# Patient Record
Sex: FEMALE | Race: WHITE | Employment: OTHER | URBAN - METROPOLITAN AREA
[De-identification: names, ages, dates, MRNs, and addresses within clinical notes are randomized per-mention and may not be internally consistent; named-entity substitution may affect disease eponyms.]

---

## 2018-03-11 ENCOUNTER — APPOINTMENT (OUTPATIENT)
Dept: GENERAL RADIOLOGY | Facility: HOSPITAL | Age: 73
End: 2018-03-11
Attending: EMERGENCY MEDICINE
Payer: MEDICARE

## 2018-03-11 ENCOUNTER — HOSPITAL ENCOUNTER (EMERGENCY)
Facility: HOSPITAL | Age: 73
Discharge: HOME | End: 2018-03-11
Attending: EMERGENCY MEDICINE
Payer: MEDICARE

## 2018-03-11 VITALS
BODY MASS INDEX: 28.13 KG/M2 | HEIGHT: 66 IN | RESPIRATION RATE: 16 BRPM | WEIGHT: 175 LBS | HEART RATE: 108 BPM | OXYGEN SATURATION: 96 % | TEMPERATURE: 98 F | SYSTOLIC BLOOD PRESSURE: 105 MMHG | DIASTOLIC BLOOD PRESSURE: 78 MMHG

## 2018-03-11 DIAGNOSIS — M79.672 LEFT FOOT PAIN: Primary | ICD-10-CM

## 2018-03-11 LAB — GLUCOSE BLD-MCNC: 95 MG/DL (ref 65–99)

## 2018-03-11 PROCEDURE — 82962 GLUCOSE BLOOD TEST: CPT

## 2018-03-11 PROCEDURE — 99283 EMERGENCY DEPT VISIT LOW MDM: CPT

## 2018-03-11 PROCEDURE — 73630 X-RAY EXAM OF FOOT: CPT | Performed by: EMERGENCY MEDICINE

## 2018-03-11 RX ORDER — COLCHICINE 0.6 MG/1
0.6 TABLET ORAL DAILY
Qty: 10 TABLET | Refills: 0 | Status: SHIPPED | OUTPATIENT
Start: 2018-03-11 | End: 2018-03-21

## 2018-03-11 RX ORDER — HYDROCODONE BITARTRATE AND ACETAMINOPHEN 5; 325 MG/1; MG/1
2 TABLET ORAL ONCE
Status: COMPLETED | OUTPATIENT
Start: 2018-03-11 | End: 2018-03-11

## 2018-03-11 RX ORDER — HYDROCODONE BITARTRATE AND ACETAMINOPHEN 5; 325 MG/1; MG/1
1 TABLET ORAL EVERY 4 HOURS PRN
Qty: 15 TABLET | Refills: 0 | Status: SHIPPED | OUTPATIENT
Start: 2018-03-11 | End: 2018-03-28

## 2018-03-11 RX ORDER — CLINDAMYCIN HYDROCHLORIDE 300 MG/1
300 CAPSULE ORAL 3 TIMES DAILY
Qty: 30 CAPSULE | Refills: 0 | Status: SHIPPED | OUTPATIENT
Start: 2018-03-11 | End: 2018-03-21

## 2018-03-11 NOTE — ED INITIAL ASSESSMENT (HPI)
Pt aox4. Pt presents to ed from home. Pt lives at home by herself. Pt c/o left foot pain that radiates to left shin. Pt states this is chronic pain but worse today.

## 2018-03-11 NOTE — ED PROVIDER NOTES
Patient Seen in: [facility] Emergency Department    History   Patient presents with:  Pain (neurologic)    Stated Complaint: left foot pain    HPI    This is a 62-year-old female who presents with left foot pain.   The patient describes her left foot lavinia. She does have good pulses sensory exam is normal there is no redness or cellulitis the left great toe is slightly swollen and it is tender on palpation and the dorsum of the foot is tender. Neuro: Alert and oriented.   The patient is moving complaints of chest pain or shortness of breath. The pain is all localized to the toe itself.   I discussed with her that it is slightly red and possibly it is gout but more more likely it is gout then and skin infection but empirically started on oral ant

## 2018-03-11 NOTE — CM/SW NOTE
When ready for discharge patient agreeable to [address] She was informed of $40 + $2 mile cost. She has contacted her family and is confident they can  medications.

## 2018-03-28 ENCOUNTER — HOSPITAL ENCOUNTER (INPATIENT)
Facility: HOSPITAL | Age: 73
LOS: 2 days | Discharge: HOME | DRG: 300 | End: 2018-03-30
Attending: EMERGENCY MEDICINE | Admitting: HOSPITALIST
Payer: MEDICARE

## 2018-03-28 ENCOUNTER — APPOINTMENT (OUTPATIENT)
Dept: ULTRASOUND IMAGING | Facility: HOSPITAL | Age: 73
DRG: 300 | End: 2018-03-28
Attending: EMERGENCY MEDICINE
Payer: MEDICARE

## 2018-03-28 DIAGNOSIS — J44.9 CHRONIC OBSTRUCTIVE PULMONARY DISEASE, UNSPECIFIED COPD TYPE (HCC): ICD-10-CM

## 2018-03-28 DIAGNOSIS — I99.8 VASCULAR OCCLUSION: Primary | ICD-10-CM

## 2018-03-28 DIAGNOSIS — I73.9 PERIPHERAL VASCULAR DISEASE OF EXTREMITY WITH CLAUDICATION (HCC): ICD-10-CM

## 2018-03-28 PROCEDURE — 93926 LOWER EXTREMITY STUDY: CPT | Performed by: EMERGENCY MEDICINE

## 2018-03-28 PROCEDURE — 99223 1ST HOSP IP/OBS HIGH 75: CPT | Performed by: HOSPITALIST

## 2018-03-28 RX ORDER — HYDROCODONE BITARTRATE AND ACETAMINOPHEN 5; 325 MG/1; MG/1
1 TABLET ORAL EVERY 4 HOURS PRN
Status: DISCONTINUED | OUTPATIENT
Start: 2018-03-28 | End: 2018-03-30

## 2018-03-28 RX ORDER — MORPHINE SULFATE 4 MG/ML
2 INJECTION, SOLUTION INTRAMUSCULAR; INTRAVENOUS EVERY 2 HOUR PRN
Status: DISCONTINUED | OUTPATIENT
Start: 2018-03-28 | End: 2018-03-28 | Stop reason: DRUGHIGH

## 2018-03-28 RX ORDER — DOCUSATE SODIUM 100 MG/1
100 CAPSULE, LIQUID FILLED ORAL 2 TIMES DAILY
Status: DISCONTINUED | OUTPATIENT
Start: 2018-03-28 | End: 2018-03-30

## 2018-03-28 RX ORDER — ASPIRIN 325 MG
325 TABLET, DELAYED RELEASE (ENTERIC COATED) ORAL DAILY
Status: DISCONTINUED | OUTPATIENT
Start: 2018-03-28 | End: 2018-03-30

## 2018-03-28 RX ORDER — MORPHINE SULFATE 4 MG/ML
4 INJECTION, SOLUTION INTRAMUSCULAR; INTRAVENOUS
Status: DISCONTINUED | OUTPATIENT
Start: 2018-03-28 | End: 2018-03-30

## 2018-03-28 RX ORDER — MORPHINE SULFATE 10 MG/ML
10 INJECTION, SOLUTION INTRAMUSCULAR; INTRAVENOUS ONCE
Status: COMPLETED | OUTPATIENT
Start: 2018-03-28 | End: 2018-03-28

## 2018-03-28 RX ORDER — LORAZEPAM 1 MG/1
1 TABLET ORAL EVERY 4 HOURS PRN
Status: DISCONTINUED | OUTPATIENT
Start: 2018-03-28 | End: 2018-03-30

## 2018-03-28 RX ORDER — ONDANSETRON 2 MG/ML
4 INJECTION INTRAMUSCULAR; INTRAVENOUS EVERY 6 HOURS PRN
Status: DISCONTINUED | OUTPATIENT
Start: 2018-03-28 | End: 2018-03-30

## 2018-03-28 RX ORDER — HYDROCODONE BITARTRATE AND ACETAMINOPHEN 5; 325 MG/1; MG/1
2 TABLET ORAL EVERY 4 HOURS PRN
Status: DISCONTINUED | OUTPATIENT
Start: 2018-03-28 | End: 2018-03-28

## 2018-03-28 RX ORDER — BISACODYL 10 MG
10 SUPPOSITORY, RECTAL RECTAL
Status: DISCONTINUED | OUTPATIENT
Start: 2018-03-28 | End: 2018-03-30

## 2018-03-28 RX ORDER — HYDROCODONE BITARTRATE AND ACETAMINOPHEN 5; 325 MG/1; MG/1
1 TABLET ORAL EVERY 4 HOURS PRN
Status: DISCONTINUED | OUTPATIENT
Start: 2018-03-28 | End: 2018-03-28

## 2018-03-28 RX ORDER — MORPHINE SULFATE 4 MG/ML
8 INJECTION, SOLUTION INTRAMUSCULAR; INTRAVENOUS
Status: DISCONTINUED | OUTPATIENT
Start: 2018-03-28 | End: 2018-03-30

## 2018-03-28 RX ORDER — MORPHINE SULFATE 4 MG/ML
1 INJECTION, SOLUTION INTRAMUSCULAR; INTRAVENOUS EVERY 2 HOUR PRN
Status: DISCONTINUED | OUTPATIENT
Start: 2018-03-28 | End: 2018-03-30

## 2018-03-28 RX ORDER — TEMAZEPAM 7.5 MG/1
7.5 CAPSULE ORAL NIGHTLY PRN
Status: DISCONTINUED | OUTPATIENT
Start: 2018-03-28 | End: 2018-03-30

## 2018-03-28 RX ORDER — HEPARIN SODIUM 5000 [USP'U]/ML
60 INJECTION INTRAVENOUS; SUBCUTANEOUS ONCE
Status: COMPLETED | OUTPATIENT
Start: 2018-03-28 | End: 2018-03-28

## 2018-03-28 RX ORDER — LORAZEPAM 0.5 MG/1
0.5 TABLET ORAL EVERY 4 HOURS PRN
Status: DISCONTINUED | OUTPATIENT
Start: 2018-03-28 | End: 2018-03-30

## 2018-03-28 RX ORDER — POLYETHYLENE GLYCOL 3350 17 G/17G
17 POWDER, FOR SOLUTION ORAL DAILY PRN
Status: DISCONTINUED | OUTPATIENT
Start: 2018-03-28 | End: 2018-03-30

## 2018-03-28 RX ORDER — ONDANSETRON 2 MG/ML
4 INJECTION INTRAMUSCULAR; INTRAVENOUS ONCE
Status: COMPLETED | OUTPATIENT
Start: 2018-03-28 | End: 2018-03-28

## 2018-03-28 RX ORDER — HEPARIN SODIUM AND DEXTROSE 10000; 5 [USP'U]/100ML; G/100ML
12 INJECTION INTRAVENOUS ONCE
Status: COMPLETED | OUTPATIENT
Start: 2018-03-28 | End: 2018-03-29

## 2018-03-28 RX ORDER — ACETAMINOPHEN 325 MG/1
650 TABLET ORAL EVERY 4 HOURS PRN
Status: DISCONTINUED | OUTPATIENT
Start: 2018-03-28 | End: 2018-03-30

## 2018-03-28 RX ORDER — ALPRAZOLAM 0.25 MG/1
0.25 TABLET ORAL 2 TIMES DAILY PRN
Status: DISCONTINUED | OUTPATIENT
Start: 2018-03-28 | End: 2018-03-30

## 2018-03-28 RX ORDER — MORPHINE SULFATE 4 MG/ML
2 INJECTION, SOLUTION INTRAMUSCULAR; INTRAVENOUS
Status: DISCONTINUED | OUTPATIENT
Start: 2018-03-28 | End: 2018-03-30

## 2018-03-28 RX ORDER — NICOTINE 21 MG/24HR
1 PATCH, TRANSDERMAL 24 HOURS TRANSDERMAL DAILY
Status: DISCONTINUED | OUTPATIENT
Start: 2018-03-28 | End: 2018-03-30

## 2018-03-28 RX ORDER — SODIUM CHLORIDE 9 MG/ML
INJECTION, SOLUTION INTRAVENOUS CONTINUOUS
Status: DISCONTINUED | OUTPATIENT
Start: 2018-03-28 | End: 2018-03-30

## 2018-03-28 RX ORDER — HEPARIN SODIUM AND DEXTROSE 10000; 5 [USP'U]/100ML; G/100ML
INJECTION INTRAVENOUS CONTINUOUS
Status: DISCONTINUED | OUTPATIENT
Start: 2018-03-29 | End: 2018-03-29

## 2018-03-28 RX ORDER — LORAZEPAM 2 MG/ML
1 INJECTION INTRAMUSCULAR ONCE
Status: COMPLETED | OUTPATIENT
Start: 2018-03-28 | End: 2018-03-28

## 2018-03-28 RX ORDER — HYDROCODONE BITARTRATE AND ACETAMINOPHEN 10; 325 MG/1; MG/1
1 TABLET ORAL EVERY 4 HOURS PRN
Status: DISCONTINUED | OUTPATIENT
Start: 2018-03-28 | End: 2018-03-30

## 2018-03-28 RX ORDER — MORPHINE SULFATE 4 MG/ML
4 INJECTION, SOLUTION INTRAMUSCULAR; INTRAVENOUS EVERY 2 HOUR PRN
Status: DISCONTINUED | OUTPATIENT
Start: 2018-03-28 | End: 2018-03-28 | Stop reason: DRUGHIGH

## 2018-03-28 NOTE — ED INITIAL ASSESSMENT (HPI)
Pt c/o pain to left foot. Pt states she fell 9 weeks ago and has had pain in the foot since. Pain has been increasing. Sent to ED from podiatrist. Left foot noted to have discoloration and weak but + pedal pulse.

## 2018-03-28 NOTE — ED PROVIDER NOTES
Patient Seen in: [facility] Emergency Department    History   Patient presents with:  Pain (neurologic)    Stated Complaint: left foot injury a few weeks ago, MRI scheduled next week, intractable pain    HPI    78-year-old female presents to the [facility]  Resp: 22  Temp: 98.4 °F (36.9 °C)  Temp src: Temporal  SpO2: 96 %  O2 Device: None (Room air)    Current:/85   Pulse 106   Temp 98.4 °F (36.9 °C) (Temporal)   Resp 18   Ht 167.6 cm (5' 6\")   Wt 79.4 kg   SpO2 95%   BMI 28.25 kg/m²         Physi components within normal limits   PTT, ACTIVATED - Abnormal; Notable for the following:     PTT 38.9 (*)     All other components within normal limits    Narrative: The aPTT Heparin Therapeutic Range is approximately 65- 104 seconds.  The therapeutic ra by: Ovi Harding MD on 3/28/2018 at 17:35     Approved by: Ovi Harding MD          ED Course as of Mar 28 1959  ------------------------------------------------------------       MDM     Patient IV established and blood work obtained.   I did obtain some

## 2018-03-29 ENCOUNTER — APPOINTMENT (OUTPATIENT)
Dept: CV DIAGNOSTICS | Facility: HOSPITAL | Age: 73
DRG: 300 | End: 2018-03-29
Attending: HOSPITALIST
Payer: MEDICARE

## 2018-03-29 ENCOUNTER — APPOINTMENT (OUTPATIENT)
Dept: INTERVENTIONAL RADIOLOGY/VASCULAR | Facility: HOSPITAL | Age: 73
DRG: 300 | End: 2018-03-29
Attending: SURGERY
Payer: MEDICARE

## 2018-03-29 PROCEDURE — 93306 TTE W/DOPPLER COMPLETE: CPT | Performed by: HOSPITALIST

## 2018-03-29 PROCEDURE — B41DYZZ FLUOROSCOPY OF AORTA AND BILATERAL LOWER EXTREMITY ARTERIES USING OTHER CONTRAST: ICD-10-PCS | Performed by: SURGERY

## 2018-03-29 PROCEDURE — 99232 SBSQ HOSP IP/OBS MODERATE 35: CPT | Performed by: HOSPITALIST

## 2018-03-29 RX ORDER — HEPARIN SODIUM 5000 [USP'U]/ML
INJECTION, SOLUTION INTRAVENOUS; SUBCUTANEOUS
Status: COMPLETED
Start: 2018-03-29 | End: 2018-03-29

## 2018-03-29 RX ORDER — LIDOCAINE HYDROCHLORIDE 10 MG/ML
INJECTION, SOLUTION INFILTRATION; PERINEURAL
Status: COMPLETED
Start: 2018-03-29 | End: 2018-03-29

## 2018-03-29 RX ORDER — MIDAZOLAM HYDROCHLORIDE 1 MG/ML
INJECTION INTRAMUSCULAR; INTRAVENOUS
Status: COMPLETED
Start: 2018-03-29 | End: 2018-03-29

## 2018-03-29 NOTE — BRIEF OP NOTE
Pre-Operative Diagnosis: Atherosclerosis with rest pain left leg     Post-Operative Diagnosis: Same     Procedure Performed:   1. Ultrasound-guided percutaneous access right common femoral artery  2. Aortogram  3.   Selection of left common femoral artery

## 2018-03-29 NOTE — CONSULTS
[facility]  Vascular Surgery Consultation    [facility] Patient Status:  Emergency    3/1/1945 MRN [id number]   Location [address] [facility] Attending Juanita Valverde MD   Hosp Day # 0 PCP No primary care provider on file.      R abdominal pain, heartburn, diarrhea, blood in bm, tarry bm, constipation,    : denies difficulty urinating, pain, blood in urine, or frequency  SKIN: denies any unusual skin lesions or rashes  MUSCULOSKELETAL: denies back pain, joint pain, leg swelling ultrasound: I independently reviewed her ultrasound images and she has a long segment appearing occlusion of her superficial femoral and popliteal artery with reconstitution    Impression and Plan:  Atherosclerosis with rest pain: After much discussion pat

## 2018-03-29 NOTE — PHYSICAL THERAPY NOTE
Attempted to see pt this AM. Per RN, pt is not appropriate for PT at this time. Pt is supposed to have a procedure later today. Will follow up at a later date.

## 2018-03-29 NOTE — PLAN OF CARE
Received patient in stable condition from ED.  AOx4, anxious, irritated. VSS on RA  C/O pain to the left lower extremity relieved with NORCO/morphine. Right pedal pulse by doppler, left pedal absent.   Discoloration on left foot noted from great toe to fo

## 2018-03-29 NOTE — OPERATIVE REPORT
Pre-Operative Diagnosis: Atherosclerosis with rest pain left leg     Post-Operative Diagnosis: Same     Procedure Performed:   1. Ultrasound-guided percutaneous access right common femoral artery  2. Aortogram  3.   Selection of left common femoral artery Patient was taken to IR Cath Lab and prepped and draped in sterile fashion. Monitored conscious sedation initiated. Timeout performed.   Using ultrasound I percutaneously accessed the right common femoral artery under direct visualization with a micropunc arteries   All devices were removed and pressure was held.       Yeni Roman MD  3/29/2018  11:16 AM

## 2018-03-29 NOTE — H&P
[facility]  History and Physical     Dmitri Perez Patient Status:  Emergency    3/1/1945 MRN [id number]   Location [address] [facility] Attending Debbie Fernandez MD   Hosp Day # 0 PCP No primary care provider on file.      Billie Equal pulses. Chest and Back: No tenderness or deformity. Abdomen: Soft, nontender, nondistended. Positive bowel sounds. No rebound, guarding or organomegaly. Neurologic: No focal neurological deficits. CNII-XII grossly intact.   Musculoskeletal: Moves

## 2018-03-29 NOTE — PROGRESS NOTES
I spoke with daughter and asked that she come in for me to discuss the findings of angiogram with her mother. After daughter and daughter's  arrived I discussed angiogram findings.   She has severe distal arterial disease in both her lower extremiti

## 2018-03-29 NOTE — CM/SW NOTE
Pt seen for d/c planning. Pt is a 69 yo female admitted for a vascular occlusion. Pt is  and lives alone in an apartment on the 3rd floor with an elevator. Pt has two dtrs who both live nearby. Pt was fairly independent for her adls.   She walks

## 2018-03-30 VITALS
RESPIRATION RATE: 18 BRPM | DIASTOLIC BLOOD PRESSURE: 56 MMHG | WEIGHT: 175.06 LBS | HEIGHT: 66 IN | SYSTOLIC BLOOD PRESSURE: 99 MMHG | TEMPERATURE: 99 F | HEART RATE: 93 BPM | BODY MASS INDEX: 28.14 KG/M2 | OXYGEN SATURATION: 95 %

## 2018-03-30 PROCEDURE — 99238 HOSP IP/OBS DSCHRG MGMT 30/<: CPT | Performed by: HOSPITALIST

## 2018-03-30 RX ORDER — NICOTINE 21 MG/24HR
1 PATCH, TRANSDERMAL 24 HOURS TRANSDERMAL DAILY
Qty: 30 PATCH | Refills: 0 | Status: SHIPPED | OUTPATIENT
Start: 2018-03-31 | End: 2018-04-05

## 2018-03-30 RX ORDER — HYDROCODONE BITARTRATE AND ACETAMINOPHEN 10; 325 MG/1; MG/1
1 TABLET ORAL EVERY 6 HOURS PRN
Qty: 40 TABLET | Refills: 0 | Status: ON HOLD | OUTPATIENT
Start: 2018-03-30 | End: 2018-04-14

## 2018-03-30 RX ORDER — HYDROCODONE BITARTRATE AND ACETAMINOPHEN 10; 325 MG/1; MG/1
1 TABLET ORAL EVERY 4 HOURS PRN
Qty: 40 TABLET | Refills: 0 | Status: SHIPPED | OUTPATIENT
Start: 2018-03-30 | End: 2018-03-30

## 2018-03-30 NOTE — PHYSICAL THERAPY NOTE
PT order received 3/28/18 and patient not appropriate for therapy on 3/29/18. PT order cancelled on 3/29/18 per Dr. Rosalino Tai.  Discussed with RN that orders were cancelled and patient will require new therapy orders per vascular surgery, if and when appropria

## 2018-03-30 NOTE — PLAN OF CARE
Patient alert and oriented times four. Meds given per MAR. Vital signs stable. Resting comfortably in bed. Foot elevated. Call light in reach.     Impaired Functional Mobility    • Achieve highest/safest level of mobility/gait Progressing        PAIN - AD

## 2018-03-30 NOTE — PLAN OF CARE
Assumed patient care 0700. Patient a/o x 4, anxious at times. VSS during shift. Oxygen maintained on RA. Pain managed with PRN Tullos and Morphine. Pain in left foot, stabbing and burning. Patient went for angiogram today w/ Dr. Josse Overton.  Site intact, soft, n

## 2018-03-30 NOTE — PROGRESS NOTES
[facility]  Progress Note     Gt Gates Patient Status:  Inpatient    3/1/1945 MRN [id number]   [facility] 7NE-A Attending Payton White MD   Hosp Day # 2 PCP No primary care provider on file.      Chief Complaint: left foot pain mg/dL). Recent Labs   Lab  03/28/18   1535   PTP  15.6*   INR  1.19*       No results for input(s): TROP, CK in the last 72 hours. Imaging: Imaging data reviewed in Epic.     Medications:   • aspirin EC  325 mg Oral Daily   • docusate sodium  100

## 2018-03-31 NOTE — DISCHARGE SUMMARY
[facility] HOSPITALIST  DISCHARGE SUMMARY     Sherrie Archibald Patient Status:  Inpatient    3/1/1945 MRN [id number]   [facility] 7NE-A Attending No att. providers found   Hosp Day # 2 PCP No primary care provider on file.      Date of Admission: along with the bottom of the foot. Patient underwent peripheral angiogram by PV surgery, severe peripheral vascular disease was found in the left leg.   Recommendation was to obtain second opinion to see if any other treatment to improve blood flow is avai collaterals but no reconstitution of the peroneal or posterior tibial artery    Incidental or significant findings and recommendations (brief descriptions):  • Recommendation to get a second opinion from tertiary center for any type of treatment to improve auscultation bilaterally. No wheezes. No rhonchi. On RA   Cardiovascular: S1, S2. Regular rate and rhythm. No murmurs, rubs or gallops. NSR   Abdomen: Soft, nontender, nondistended. Positive bowel sounds. No rebound or guarding.   Neurologic: No focal ne

## 2018-04-03 NOTE — CM/SW NOTE
04/03/18 0900   Discharge disposition   Expected discharge disposition Home or Self   Name of [address] [place] [address] services after discharge None   Discharge transportation Private car

## 2018-04-09 ENCOUNTER — ANESTHESIA EVENT (OUTPATIENT)
Dept: SURGERY | Facility: HOSPITAL | Age: 73
End: 2018-04-09

## 2018-04-09 ENCOUNTER — ANESTHESIA (OUTPATIENT)
Dept: SURGERY | Facility: HOSPITAL | Age: 73
End: 2018-04-09

## 2018-04-09 ENCOUNTER — HOSPITAL ENCOUNTER (INPATIENT)
Facility: HOSPITAL | Age: 73
LOS: 5 days | Discharge: NURSING, REHAB OR LONG-TERM CARE | DRG: 240 | End: 2018-04-14
Attending: SURGERY | Admitting: SURGERY
Payer: MEDICARE

## 2018-04-09 ENCOUNTER — SURGERY (OUTPATIENT)
Age: 73
End: 2018-04-09

## 2018-04-09 PROBLEM — G47.30 SLEEP APNEA: Chronic | Status: ACTIVE | Noted: 2018-04-09

## 2018-04-09 PROBLEM — Z86.718 HISTORY OF BLOOD CLOTS: Status: ACTIVE | Noted: 2018-04-09

## 2018-04-09 PROCEDURE — 99222 1ST HOSP IP/OBS MODERATE 55: CPT | Performed by: INTERNAL MEDICINE

## 2018-04-09 PROCEDURE — 0Y6J0Z1 DETACHMENT AT LEFT LOWER LEG, HIGH, OPEN APPROACH: ICD-10-PCS | Performed by: SURGERY

## 2018-04-09 RX ORDER — ONDANSETRON 2 MG/ML
4 INJECTION INTRAMUSCULAR; INTRAVENOUS EVERY 6 HOURS PRN
Status: DISCONTINUED | OUTPATIENT
Start: 2018-04-09 | End: 2018-04-14

## 2018-04-09 RX ORDER — SODIUM CHLORIDE, SODIUM LACTATE, POTASSIUM CHLORIDE, CALCIUM CHLORIDE 600; 310; 30; 20 MG/100ML; MG/100ML; MG/100ML; MG/100ML
INJECTION, SOLUTION INTRAVENOUS CONTINUOUS
Status: DISCONTINUED | OUTPATIENT
Start: 2018-04-09 | End: 2018-04-09

## 2018-04-09 RX ORDER — MEPERIDINE HYDROCHLORIDE 25 MG/ML
12.5 INJECTION INTRAMUSCULAR; INTRAVENOUS; SUBCUTANEOUS AS NEEDED
Status: DISCONTINUED | OUTPATIENT
Start: 2018-04-09 | End: 2018-04-09 | Stop reason: HOSPADM

## 2018-04-09 RX ORDER — HYDROCODONE BITARTRATE AND ACETAMINOPHEN 5; 325 MG/1; MG/1
1 TABLET ORAL AS NEEDED
Status: DISCONTINUED | OUTPATIENT
Start: 2018-04-09 | End: 2018-04-09 | Stop reason: HOSPADM

## 2018-04-09 RX ORDER — DIPHENHYDRAMINE HYDROCHLORIDE 50 MG/ML
12.5 INJECTION INTRAMUSCULAR; INTRAVENOUS EVERY 4 HOURS PRN
Status: DISCONTINUED | OUTPATIENT
Start: 2018-04-09 | End: 2018-04-10

## 2018-04-09 RX ORDER — HEPARIN SODIUM 5000 [USP'U]/ML
5000 INJECTION, SOLUTION INTRAVENOUS; SUBCUTANEOUS EVERY 8 HOURS SCHEDULED
Status: DISCONTINUED | OUTPATIENT
Start: 2018-04-09 | End: 2018-04-14

## 2018-04-09 RX ORDER — HYDROCODONE BITARTRATE AND ACETAMINOPHEN 10; 325 MG/1; MG/1
1 TABLET ORAL EVERY 4 HOURS PRN
Status: DISCONTINUED | OUTPATIENT
Start: 2018-04-09 | End: 2018-04-10

## 2018-04-09 RX ORDER — MORPHINE SULFATE 4 MG/ML
2 INJECTION, SOLUTION INTRAMUSCULAR; INTRAVENOUS EVERY 30 MIN PRN
Status: DISPENSED | OUTPATIENT
Start: 2018-04-09 | End: 2018-04-12

## 2018-04-09 RX ORDER — NALOXONE HYDROCHLORIDE 0.4 MG/ML
0.08 INJECTION, SOLUTION INTRAMUSCULAR; INTRAVENOUS; SUBCUTANEOUS
Status: DISCONTINUED | OUTPATIENT
Start: 2018-04-09 | End: 2018-04-14

## 2018-04-09 RX ORDER — MORPHINE SULFATE 4 MG/ML
INJECTION, SOLUTION INTRAMUSCULAR; INTRAVENOUS
Status: DISPENSED
Start: 2018-04-09 | End: 2018-04-10

## 2018-04-09 RX ORDER — SODIUM CHLORIDE 9 MG/ML
INJECTION, SOLUTION INTRAVENOUS CONTINUOUS
Status: DISCONTINUED | OUTPATIENT
Start: 2018-04-09 | End: 2018-04-14

## 2018-04-09 RX ORDER — ONDANSETRON 2 MG/ML
4 INJECTION INTRAMUSCULAR; INTRAVENOUS AS NEEDED
Status: DISCONTINUED | OUTPATIENT
Start: 2018-04-09 | End: 2018-04-09 | Stop reason: HOSPADM

## 2018-04-09 RX ORDER — TEMAZEPAM 15 MG/1
15 CAPSULE ORAL NIGHTLY PRN
Status: DISCONTINUED | OUTPATIENT
Start: 2018-04-09 | End: 2018-04-10

## 2018-04-09 RX ORDER — KETOROLAC TROMETHAMINE 30 MG/ML
30 INJECTION, SOLUTION INTRAMUSCULAR; INTRAVENOUS EVERY 6 HOURS PRN
Status: DISCONTINUED | OUTPATIENT
Start: 2018-04-09 | End: 2018-04-10

## 2018-04-09 RX ORDER — MORPHINE SULFATE 4 MG/ML
2 INJECTION, SOLUTION INTRAMUSCULAR; INTRAVENOUS EVERY 5 MIN PRN
Status: DISCONTINUED | OUTPATIENT
Start: 2018-04-09 | End: 2018-04-09 | Stop reason: HOSPADM

## 2018-04-09 RX ORDER — HYDROCODONE BITARTRATE AND ACETAMINOPHEN 10; 325 MG/1; MG/1
2 TABLET ORAL EVERY 4 HOURS PRN
Status: DISCONTINUED | OUTPATIENT
Start: 2018-04-09 | End: 2018-04-10

## 2018-04-09 RX ORDER — MIDAZOLAM HYDROCHLORIDE 1 MG/ML
1 INJECTION INTRAMUSCULAR; INTRAVENOUS EVERY 5 MIN PRN
Status: DISCONTINUED | OUTPATIENT
Start: 2018-04-09 | End: 2018-04-09 | Stop reason: HOSPADM

## 2018-04-09 RX ORDER — NALBUPHINE HCL 10 MG/ML
2.5 AMPUL (ML) INJECTION EVERY 4 HOURS PRN
Status: DISCONTINUED | OUTPATIENT
Start: 2018-04-09 | End: 2018-04-14

## 2018-04-09 RX ORDER — CLINDAMYCIN PHOSPHATE 900 MG/50ML
900 INJECTION INTRAVENOUS EVERY 8 HOURS
Status: COMPLETED | OUTPATIENT
Start: 2018-04-09 | End: 2018-04-10

## 2018-04-09 RX ORDER — IPRATROPIUM BROMIDE AND ALBUTEROL SULFATE 2.5; .5 MG/3ML; MG/3ML
3 SOLUTION RESPIRATORY (INHALATION) EVERY 6 HOURS PRN
Status: DISCONTINUED | OUTPATIENT
Start: 2018-04-09 | End: 2018-04-14

## 2018-04-09 RX ORDER — NALOXONE HYDROCHLORIDE 0.4 MG/ML
80 INJECTION, SOLUTION INTRAMUSCULAR; INTRAVENOUS; SUBCUTANEOUS AS NEEDED
Status: DISCONTINUED | OUTPATIENT
Start: 2018-04-09 | End: 2018-04-09 | Stop reason: HOSPADM

## 2018-04-09 RX ORDER — MORPHINE SULFATE 4 MG/ML
INJECTION, SOLUTION INTRAMUSCULAR; INTRAVENOUS
Status: COMPLETED
Start: 2018-04-09 | End: 2018-04-09

## 2018-04-09 RX ORDER — SODIUM CHLORIDE, SODIUM LACTATE, POTASSIUM CHLORIDE, CALCIUM CHLORIDE 600; 310; 30; 20 MG/100ML; MG/100ML; MG/100ML; MG/100ML
INJECTION, SOLUTION INTRAVENOUS CONTINUOUS
Status: DISCONTINUED | OUTPATIENT
Start: 2018-04-09 | End: 2018-04-09 | Stop reason: HOSPADM

## 2018-04-09 RX ORDER — CLINDAMYCIN PHOSPHATE 900 MG/50ML
900 INJECTION INTRAVENOUS ONCE
Status: DISCONTINUED | OUTPATIENT
Start: 2018-04-09 | End: 2018-04-09 | Stop reason: HOSPADM

## 2018-04-09 RX ORDER — HYDROCODONE BITARTRATE AND ACETAMINOPHEN 5; 325 MG/1; MG/1
2 TABLET ORAL AS NEEDED
Status: DISCONTINUED | OUTPATIENT
Start: 2018-04-09 | End: 2018-04-09 | Stop reason: HOSPADM

## 2018-04-09 RX ORDER — MIDAZOLAM HYDROCHLORIDE 1 MG/ML
INJECTION INTRAMUSCULAR; INTRAVENOUS
Status: COMPLETED
Start: 2018-04-09 | End: 2018-04-09

## 2018-04-09 NOTE — CONSULTS
[facility]  Report of Consultation    Erika Rasmussen Patient Status:  Inpatient    3/1/1945 MRN [id number]   [facility] 3SW-A Attending Jayson Cardoza MD   Hosp Day # 0 PCP No primary care provider on file.      Reason for Consultatio drugs.     Allergies:    Pcns [Penicillins]      Rash, Swelling    Medications:    Current Facility-Administered Medications:   •  ondansetron HCl (ZOFRAN) injection 4 mg, 4 mg, Intravenous, Q6H PRN  •  Naloxone HCl (NARCAN) 0.4 MG/ML injection 0.08 mg, 0.0 by vascular surgery    1. COPD– stable at this time. Duo nebs as needed  2. Obesity with a BMI of 31.64  3.  Obstructive sleep apnea–patient does not use CPAP at home as informed by patient has unable to tolerate the machine according to patient and patien

## 2018-04-09 NOTE — H&P
[facility]  Vascular Surgery     Cam Speck Patient Status:  Hospital Outpatient Surgery    3/1/1945 MRN [id number]   Location [address] [facility] Attending Jacqueline Bear MD   Hosp Day # 0 PCP No primary care provider on file. swelling  NEURO/EYES: denies headaches, passing out, motor dysfunction, difficulty walking, difficulty with speech, temporary blindness, double vision, confusion    Physical Exam:  /81 (BP Location: Right arm)   Pulse 117   Temp 98.1 °F (36.7 °C) (Or

## 2018-04-09 NOTE — ANESTHESIA PREPROCEDURE EVALUATION
PRE-OP EVALUATION    Patient Name: Tamanna Rae    Pre-op Diagnosis: VASCULAR OCCLUSION      Procedure(s):  LEFT BELOW KNEE AMPUTATION     Surgeon(s) and Role:     Shaista Piedra MD - Primary    Pre-op vitals reviewed.   Temp: 98.1 °F (36.7 °C)  Pulse Chronic obstructive pulmonary disease, unspecified COPD type ([facility]          Past Surgical History:  RT HIP: OTHER     Smoking status: Current Every Day Smoker  1.00 Packs/day  For 56.00 Years     Smokeless tobacco: Never Used    Alcohol use No    Commen

## 2018-04-09 NOTE — BRIEF OP NOTE
Pre-Operative Diagnosis: Atherosclerosis with rest pain and ulcer      Post-Operative Diagnosis: same     Procedure Performed:   1.  Left below knee amputation with posterior flap    Surgeon(s) and Role:     Jenny Friedman MD - Primary    Assistant(s):

## 2018-04-09 NOTE — ANESTHESIA POSTPROCEDURE EVALUATION
Joe Patient Status:  Hospital Outpatient Surgery   Age/Gender 68year old female MRN [id number]   Location [address] [facility] Attending Jamee Campbell MD   [facility] Day # 0 PCP No primary care provider on f

## 2018-04-09 NOTE — CONSULTS
[facility] Pharmacy Note:  Pain Consult    Steven Bones is a 68year old female started on Morphine PCA by Dr. Caridad Gleason. Pharmacy was consulted to review medication profile and to discontinue previously ordered narcotics and sedatives.     Medication profile wa

## 2018-04-10 PROCEDURE — 99232 SBSQ HOSP IP/OBS MODERATE 35: CPT | Performed by: HOSPITALIST

## 2018-04-10 RX ORDER — NICOTINE 21 MG/24HR
1 PATCH, TRANSDERMAL 24 HOURS TRANSDERMAL
Status: DISCONTINUED | OUTPATIENT
Start: 2018-04-10 | End: 2018-04-14

## 2018-04-10 RX ORDER — KETOROLAC TROMETHAMINE 30 MG/ML
15 INJECTION, SOLUTION INTRAMUSCULAR; INTRAVENOUS EVERY 6 HOURS PRN
Status: DISPENSED | OUTPATIENT
Start: 2018-04-10 | End: 2018-04-11

## 2018-04-10 RX ORDER — HYDROCODONE BITARTRATE AND ACETAMINOPHEN 10; 325 MG/1; MG/1
1 TABLET ORAL EVERY 4 HOURS PRN
Status: DISCONTINUED | OUTPATIENT
Start: 2018-04-10 | End: 2018-04-11

## 2018-04-10 RX ORDER — HYDROCODONE BITARTRATE AND ACETAMINOPHEN 10; 325 MG/1; MG/1
1 TABLET ORAL
Status: DISCONTINUED | OUTPATIENT
Start: 2018-04-10 | End: 2018-04-11

## 2018-04-10 NOTE — PHYSICAL THERAPY NOTE
PHYSICAL THERAPY EVALUATION - INPATIENT     Room Number: 359/359-A  Evaluation Date: 4/10/2018  Type of Evaluation: Initial  Physician Order: PT Eval and Treat    Presenting Problem: s/p L BKA  Reason for Therapy: Mobility Dysfunction and Discharge P Cognitive Status:  WFL - within functional limits    RANGE OF MOTION AND STRENGTH ASSESSMENT  Upper extremity ROM and strength are within functional limits     Lower extremity ROM is within functional limits unable to fully assess L LE due to pain, but hip mobility  Functional activity tolerated  Lower therapeutic exercise:   Ankle pumps  Hip AB/AD  Knee extension  SLR  Upper therapeutic exercise:  Elbow flex/ext,  - open/close, OH Reaching, Shoulder flex/ext and Wrist flex/ext    Patient End of Session:

## 2018-04-10 NOTE — PROGRESS NOTES
PCA dose changed as ordered with second Rn present, patient informed of change/pain management plan. Toradol given for pain, patient states relief. Repositioned for comfort.  Dr Debbie Barbosa returned Rn's call, and stated ok to start heparin tonight SQ as ordered

## 2018-04-10 NOTE — PROGRESS NOTES
Acute Pain Service    PCA Follow-up Note    Indication: Post-Surgical.    Patient is comfortable at rest and tolerated sitting on edge of bed with PT today.      Side Effects: Sedation at times but awake alert and oriented x 3 now    Vital signs:     BP

## 2018-04-10 NOTE — PROGRESS NOTES
[facility]  Progress Note     Samreen Benitez Patient Status:  Inpatient    3/1/1945 MRN [id number]   [facility] 3SW-A Attending Lise Alba MD   Hosp Day # 1 PCP No primary care provider on file.      Chief Complaint: DVT  S: Gemma Carlson is expected to be discharge to: KANG Peters MD

## 2018-04-10 NOTE — PROGRESS NOTES
Patient received from PACU s/p L BKA with Dr Tera Mead. Ace wrap dressing and knee immobilizer to E.  Patient moaning due to pain, per report patient has been moaning from pain since arrival for surgery this am. MSO4 PCA 1/10/6, gave morphine bolus on arriva

## 2018-04-10 NOTE — PROGRESS NOTES
Discussed with pain management possibility of adding a muscle relaxant to patient's pain medications. Will be down to see patient and make medication adjustments.

## 2018-04-10 NOTE — PROGRESS NOTES
[facility] Pharmacy Note:  Age Based Dose Adjustment    Michael Frye has been prescribed ketorolac (TORADOL) 30 mg IV every 6 hours as needed. Patient is >71 years old therefore the dose has been adjusted to 15 mg IV every 6 hours as needed.       Thank you,  S

## 2018-04-10 NOTE — CM/SW NOTE
04/10/18 1200   Readmission Assessment   Did any new symptoms or issues develop after you were discharged? No   ----Post D/C symptoms: Symptoms/issue related to previous hospitalization Yes   Did you understand your discharge instructions?  Yes   Pt's le qualify for homemaker services and meals on wheels. Pt adds that they also completed a Medicaid application at that time, so it is pending. SW spoke with pt's dtr, Erasto Fay, re: DAN[facility] list and process for making choice.   She will review with pt and her siste

## 2018-04-11 PROCEDURE — 99232 SBSQ HOSP IP/OBS MODERATE 35: CPT | Performed by: HOSPITALIST

## 2018-04-11 RX ORDER — ALPRAZOLAM 0.25 MG/1
0.25 TABLET ORAL ONCE
Status: COMPLETED | OUTPATIENT
Start: 2018-04-11 | End: 2018-04-11

## 2018-04-11 RX ORDER — ALPRAZOLAM 0.25 MG/1
0.25 TABLET ORAL 3 TIMES DAILY PRN
Status: DISCONTINUED | OUTPATIENT
Start: 2018-04-11 | End: 2018-04-11

## 2018-04-11 RX ORDER — ALPRAZOLAM 0.25 MG/1
0.25 TABLET ORAL 3 TIMES DAILY
Status: DISCONTINUED | OUTPATIENT
Start: 2018-04-11 | End: 2018-04-13

## 2018-04-11 RX ORDER — GABAPENTIN 100 MG/1
100 CAPSULE ORAL 3 TIMES DAILY
Status: DISCONTINUED | OUTPATIENT
Start: 2018-04-11 | End: 2018-04-14

## 2018-04-11 RX ORDER — DIPHENHYDRAMINE HCL 25 MG
25 CAPSULE ORAL NIGHTLY
Status: DISCONTINUED | OUTPATIENT
Start: 2018-04-11 | End: 2018-04-11

## 2018-04-11 RX ORDER — DIPHENHYDRAMINE HCL 25 MG
25 CAPSULE ORAL NIGHTLY PRN
Status: DISCONTINUED | OUTPATIENT
Start: 2018-04-11 | End: 2018-04-14

## 2018-04-11 RX ORDER — MORPHINE SULFATE 4 MG/ML
2 INJECTION, SOLUTION INTRAMUSCULAR; INTRAVENOUS EVERY 2 HOUR PRN
Status: DISCONTINUED | OUTPATIENT
Start: 2018-04-12 | End: 2018-04-13 | Stop reason: DRUGHIGH

## 2018-04-11 RX ORDER — HYDROCODONE BITARTRATE AND ACETAMINOPHEN 10; 325 MG/1; MG/1
2 TABLET ORAL
Status: DISCONTINUED | OUTPATIENT
Start: 2018-04-11 | End: 2018-04-14

## 2018-04-11 NOTE — PROGRESS NOTES
[facility]  Progress Note     Saurav Rodriguez Patient Status:  Inpatient    3/1/1945 MRN [id number]   [facility] 3SW-A Attending Rainer Martinez MD   Hosp Day # 2 PCP No primary care provider on file.      Chief Complaint: left leg p hypoventilation syndrome- SHYAM protocol  5. Remote h/o DVT- no hypercoagulable state  6.  Nicotine dependence- patch PRN    Plan of care: as above    Quality:  · DVT Prophylaxis: Heparin  · CODE status: Full  · Kessler: N/A  · Central line: N/A    Estimated da

## 2018-04-11 NOTE — CM/SW NOTE
Spoke with pt's dtr, Janet Oropeza. She notes that her sister toured 2 places and Janet Oropeza will be touring another place. They will let SW know choice, so referral can be sent.

## 2018-04-11 NOTE — PROGRESS NOTES
Acute Pain Service     PCA Follow-up Note     Indication: Post-Surgical.     Patient is crying after being taken off bedpan. States PCT was too rough and rushed her and that other PCTs were more careful with her.  Consoled patient and offered use of PC

## 2018-04-11 NOTE — PHYSICAL THERAPY NOTE
PHYSICAL THERAPY TREATMENT NOTE - INPATIENT    Room Number: 407/554-Q     Session: 1   Number of Visits to Meet Established Goals: 5    Presenting Problem: S/P Left BKA on 04/09/18    Problem List  Active Problems:    Peripheral vascular disease of extrem the patient currently have. ..  -   Turning over in bed (including adjusting bedclothes, sheets and blankets)?: A Little   -   Sitting down on and standing up from a chair with arms (e.g., wheelchair, bedside commode, etc.): Unable   -   Moving from lying o participation with PT. Patient continues to function below baseline. Will continue to benefit by PT to maximize functional mobility & initiate pre prosthetic training.     DISCHARGE RECOMMENDATIONS  PT Discharge Recommendations: Sub-acute rehabilitation ([facility]ATA

## 2018-04-12 PROCEDURE — 99232 SBSQ HOSP IP/OBS MODERATE 35: CPT | Performed by: HOSPITALIST

## 2018-04-12 NOTE — PROGRESS NOTES
[facility]  Progress Note     Fide Dolly Patient Status:  Inpatient    3/1/1945 MRN [id number]   [facility] 3SW-A Attending Parish Lee MD   Hosp Day # 3 PCP No primary care provider on file.      Chief Complaint: left leg p as above  3. Lupus- no acute issue   4. SHYAM with obesity hypoventilation syndrome- SHYAM protocol  5. Remote h/o DVT- no hypercoagulable state  6.  Nicotine dependence- patch PRN    Plan of care: as above    Quality:  · DVT Prophylaxis: Heparin  · CODE status

## 2018-04-12 NOTE — PHYSICAL THERAPY NOTE
PHYSICAL THERAPY TREATMENT NOTE - INPATIENT    Room Number: 350/438-A     Session: 2  Number of Visits to Meet Established Goals: 5    Presenting Problem: S/P Left BKA on 04/09/18    Problem List  Active Problems:    Peripheral vascular disease of extremi patient currently have. ..  -   Turning over in bed (including adjusting bedclothes, sheets and blankets)?: A Little   -   Sitting down on and standing up from a chair with arms (e.g., wheelchair, bedside commode, etc.): Unable   -   Moving from lying on ba for BRIGETTE LOPEZ. Pertinent comorbidities and personal factors impacting therapy include pain, anxiety, COPD. Continues to remain very anxious & pain limiting her participation with PT. Patient continues to function below baseline. Will continue to benefit by PT t

## 2018-04-12 NOTE — PROGRESS NOTES
[facility]  Progress Note    Risa Wadsworth Patient Status:  Inpatient    3/1/1945 MRN [id number]   [facility] 3SW-A Attending Jamar Crystal MD   Hosp Day # 3 PCP No primary care provider on file. Subjective:   Risa Wadsworth is

## 2018-04-12 NOTE — CM/SW NOTE
Call from pt's dtr, Nicola Wagoner. She and sister have decided that [facility] would be first choice, then [facility] and [facility]. Referrals sent to above SARs via [facility]- awaiting responses.

## 2018-04-12 NOTE — BH LEVEL OF CARE ASSESSMENT
Level of Care Assessment Note      General Questions  Why are you here?: Pt reported that she has been having an increase in anxiety over the past 4 months but stated that she doesn't know what triggered it.   She does report panic attacks as well and while Destructive Behavior Toward Property: No     Access to Means  Access to Means: No  Access to Firearm/Weapon: No  Discussion for Removal: N/A  Do you have a firearm owner ID card?: No     Self Injury  History of Self Injurious Behaviors: No  Present Self-In Symptoms: Denies past symptoms  Current Withdrawal Symptoms: No     Compulsive Behaviors  Are you/others concerned about any of the following behaviors over the past 30 days?: Denies                                Functional Impairment  Currently Attending Summary: Pt is a 68year old female who was brought to [facility] for medical issues. Pt reported that she developed anxiety about 4 months ago and that her anxiety has been increasing.   She stated that she has panic attacks but can't remember how often or

## 2018-04-12 NOTE — PLAN OF CARE
PAIN - ADULT    • Verbalizes/displays adequate comfort level or patient's stated pain goal Not Progressing          DISCHARGE PLANNING    • Discharge to home or other facility with appropriate resources Progressing        Impaired Activities of Daily Livin

## 2018-04-13 PROCEDURE — 99232 SBSQ HOSP IP/OBS MODERATE 35: CPT | Performed by: HOSPITALIST

## 2018-04-13 PROCEDURE — 90792 PSYCH DIAG EVAL W/MED SRVCS: CPT | Performed by: OTHER

## 2018-04-13 RX ORDER — TRAZODONE HYDROCHLORIDE 50 MG/1
50 TABLET ORAL NIGHTLY PRN
Status: DISCONTINUED | OUTPATIENT
Start: 2018-04-13 | End: 2018-04-14

## 2018-04-13 RX ORDER — DOCUSATE SODIUM 100 MG/1
100 CAPSULE, LIQUID FILLED ORAL 2 TIMES DAILY
Status: DISCONTINUED | OUTPATIENT
Start: 2018-04-13 | End: 2018-04-14

## 2018-04-13 RX ORDER — MORPHINE SULFATE 4 MG/ML
2 INJECTION, SOLUTION INTRAMUSCULAR; INTRAVENOUS EVERY 4 HOURS PRN
Status: DISCONTINUED | OUTPATIENT
Start: 2018-04-13 | End: 2018-04-14

## 2018-04-13 RX ORDER — LORAZEPAM 0.5 MG/1
0.5 TABLET ORAL 2 TIMES DAILY PRN
Status: DISCONTINUED | OUTPATIENT
Start: 2018-04-13 | End: 2018-04-14

## 2018-04-13 RX ORDER — BISACODYL 10 MG
10 SUPPOSITORY, RECTAL RECTAL
Status: DISCONTINUED | OUTPATIENT
Start: 2018-04-13 | End: 2018-04-14

## 2018-04-13 NOTE — PROGRESS NOTES
[facility]  Progress Note     Vanesaprema Sauceda Patient Status:  Inpatient    3/1/1945 MRN [id number]   [facility] 3SW-A Attending Yuniel Tang MD   Hosp Day # 4 PCP No primary care provider on file.      Chief Complaint: left leg p ASSESSMENT / PLAN:     1. s/p left BKA  1. Per Dr. Dutch Oppenheim  2. Pain control  2. PVD- as above  3. Lupus- no acute issue   4. SHYAM with obesity hypoventilation syndrome- SHYAM protocol  5. Remote h/o DVT- no hypercoagulable state  6.  Nicotine dependence-

## 2018-04-13 NOTE — PLAN OF CARE
DISCHARGE PLANNING    • Discharge to home or other facility with appropriate resources Progressing        Impaired Activities of Daily Living    • Achieve highest/safest level of independence in self care Progressing        Impaired Functional Mobility precautions in place. Will continue to monitor.

## 2018-04-13 NOTE — PHYSICAL THERAPY NOTE
PHYSICAL THERAPY TREATMENT NOTE - INPATIENT    Room Number: 843/469-X     Session: 3  Number of Visits to Meet Established Goals: 5    Presenting Problem: S/P Left BKA on 04/09/18    Problem List  Active Problems:    Peripheral vascular disease of extremi tested    ACTIVITY TOLERANCE  Room air  No shortness of breath    AM-PAC '6-Clicks' INPATIENT SHORT FORM - BASIC MOBILITY  How much difficulty does the patient currently have. ..  -   Turning over in bed (including adjusting bedclothes, sheets and blankets) addressed;SCDs in place; Discussed recommendations with /    ASSESSMENT   Patient is a 68year old female admitted on 4/9/2018 for L BKA. Pertinent comorbidities and personal factors impacting therapy include pain, anxiety, COPD. Co

## 2018-04-13 NOTE — CM/SW NOTE
Responses from [facility] and [facility] - both can accept pt. Family's first choice is [phone]. Spoke with dtr, Juanita Baptiste, re: above. Will plan for [facility] when pt is medically cleared for d/c.  SW following.

## 2018-04-13 NOTE — BH PROGRESS NOTE
Review of chart, pt was given referrals to therapists yesterday by psych liaison. David Haas will see today and let liaison know if pt needs anything other than the referrals.

## 2018-04-13 NOTE — PROGRESS NOTES
[facility]  Progress Note    Silvia Diaz Patient Status:  Inpatient    3/1/1945 MRN [id number]   [facility] 3SW-A Attending Vicki Blanton MD   Hosp Day # 4 PCP No primary care provider on file. Subjective:   Silvia Diaz is

## 2018-04-14 VITALS
SYSTOLIC BLOOD PRESSURE: 114 MMHG | DIASTOLIC BLOOD PRESSURE: 83 MMHG | OXYGEN SATURATION: 98 % | BODY MASS INDEX: 31.5 KG/M2 | RESPIRATION RATE: 18 BRPM | HEART RATE: 89 BPM | WEIGHT: 196 LBS | HEIGHT: 66 IN | TEMPERATURE: 98 F

## 2018-04-14 PROCEDURE — 99232 SBSQ HOSP IP/OBS MODERATE 35: CPT | Performed by: HOSPITALIST

## 2018-04-14 RX ORDER — DULOXETIN HYDROCHLORIDE 30 MG/1
30 CAPSULE, DELAYED RELEASE ORAL DAILY
Status: DISCONTINUED | OUTPATIENT
Start: 2018-04-14 | End: 2018-04-14

## 2018-04-14 RX ORDER — HYDROCODONE BITARTRATE AND ACETAMINOPHEN 10; 325 MG/1; MG/1
2 TABLET ORAL EVERY 6 HOURS PRN
Qty: 30 TABLET | Refills: 0 | Status: ON HOLD | OUTPATIENT
Start: 2018-04-14 | End: 2018-05-23

## 2018-04-14 RX ORDER — GABAPENTIN 100 MG/1
100 CAPSULE ORAL 3 TIMES DAILY
Qty: 90 CAPSULE | Refills: 0 | Status: SHIPPED | OUTPATIENT
Start: 2018-04-14 | End: 2018-04-30 | Stop reason: DRUGHIGH

## 2018-04-14 RX ORDER — TRAMADOL HYDROCHLORIDE 50 MG/1
50 TABLET ORAL EVERY 6 HOURS PRN
Qty: 30 TABLET | Refills: 0 | Status: ON HOLD | OUTPATIENT
Start: 2018-04-14 | End: 2018-05-23

## 2018-04-14 RX ORDER — TRAMADOL HYDROCHLORIDE 50 MG/1
50 TABLET ORAL EVERY 6 HOURS PRN
Status: DISCONTINUED | OUTPATIENT
Start: 2018-04-14 | End: 2018-04-14

## 2018-04-14 RX ORDER — PSEUDOEPHEDRINE HCL 30 MG
100 TABLET ORAL 2 TIMES DAILY
Qty: 60 CAPSULE | Refills: 0 | Status: SHIPPED | OUTPATIENT
Start: 2018-04-14 | End: 2018-05-03 | Stop reason: ALTCHOICE

## 2018-04-14 RX ORDER — DULOXETIN HYDROCHLORIDE 30 MG/1
30 CAPSULE, DELAYED RELEASE ORAL DAILY
Qty: 30 CAPSULE | Refills: 0 | Status: SHIPPED | OUTPATIENT
Start: 2018-04-15 | End: 2018-05-03 | Stop reason: ALTCHOICE

## 2018-04-14 RX ORDER — TRAZODONE HYDROCHLORIDE 50 MG/1
50 TABLET ORAL NIGHTLY PRN
Qty: 30 TABLET | Refills: 0 | Status: SHIPPED | OUTPATIENT
Start: 2018-04-14 | End: 2018-10-30

## 2018-04-14 NOTE — CM/SW NOTE
Final dc orders received. segundo confirmed bed at [facility]. [facility] arranged [facility] [phone] for 6pm. RN to call report to [phone].  PCS form completed

## 2018-04-14 NOTE — PROGRESS NOTES
The patient is doing well from a vascular surgery standpoint. She is okay to go to rehabilitation/nursing home from a vascular surgery standpoint when final medical clearance for transfer is allowed.   The patient should return for a wound check and possib

## 2018-04-14 NOTE — CM/SW NOTE
04/14/18 1500   Discharge disposition   Expected discharge disposition Skilled Nurs   Name of [address]   Discharge transportation Other (comment)  ([facility] [phone])     [facility]  [phone]

## 2018-04-14 NOTE — PROGRESS NOTES
Pain Service    Assessed pt in bed. PCA has stopped this morning by RN. Pt states her pain is well managed but is \"nervous\" about not having control of medication thru PCA. Rates pain 3-4/10 at rest and 6/10 with activity. Plan:  1.  Discontinue PCA o

## 2018-04-14 NOTE — PROGRESS NOTES
[facility] H[facility]ITAL[facility]  Progress Note     Ayaka Ji Patient Status:  Inpatient    3/1/1945 MRN [id number]   [facility] 3SW-A Attending Ed Alvarado MD   Hosp Day # 5 PCP No primary care provider on file.      Chief Complaint: left leg p ASSESSMENT / PLAN:     1. s/p left BKA  1. Per Dr. Munira Baer  2. Pain control  2. PVD- as above  3. Lupus- no acute issue   4. SHYAM with obesity hypoventilation syndrome- SHYAM protocol  5. Remote h/o DVT- no hypercoagulable state  6.  Nicotine dependence-

## 2018-04-16 ENCOUNTER — SNF VISIT (OUTPATIENT)
Dept: INTERNAL MEDICINE CLINIC | Age: 73
End: 2018-04-16

## 2018-04-16 ENCOUNTER — NURSE ONLY (OUTPATIENT)
Dept: LAB | Age: 73
End: 2018-04-16
Attending: FAMILY MEDICINE
Payer: MEDICARE

## 2018-04-16 VITALS
RESPIRATION RATE: 18 BRPM | SYSTOLIC BLOOD PRESSURE: 143 MMHG | TEMPERATURE: 97 F | DIASTOLIC BLOOD PRESSURE: 89 MMHG | HEART RATE: 87 BPM | OXYGEN SATURATION: 95 %

## 2018-04-16 DIAGNOSIS — I73.9 PVD (PERIPHERAL VASCULAR DISEASE) (HCC): ICD-10-CM

## 2018-04-16 DIAGNOSIS — Z89.512 HX OF BKA, LEFT (HCC): Primary | ICD-10-CM

## 2018-04-16 DIAGNOSIS — Z89.512 S/P BKA (BELOW KNEE AMPUTATION) UNILATERAL, LEFT (HCC): Primary | ICD-10-CM

## 2018-04-16 DIAGNOSIS — L93.0 LUPUS ERYTHEMATOSUS, UNSPECIFIED FORM: ICD-10-CM

## 2018-04-16 DIAGNOSIS — J42 CHRONIC BRONCHITIS, UNSPECIFIED CHRONIC BRONCHITIS TYPE (HCC): ICD-10-CM

## 2018-04-16 DIAGNOSIS — M15.3 OTHER SECONDARY OSTEOARTHRITIS OF MULTIPLE SITES: ICD-10-CM

## 2018-04-16 DIAGNOSIS — E55.9 VITAMIN D DEFICIENCY: ICD-10-CM

## 2018-04-16 DIAGNOSIS — G62.9 NEUROPATHY: ICD-10-CM

## 2018-04-16 DIAGNOSIS — F17.200 SMOKER: ICD-10-CM

## 2018-04-16 PROCEDURE — 99309 SBSQ NF CARE MODERATE MDM 30: CPT | Performed by: NURSE PRACTITIONER

## 2018-04-16 PROCEDURE — 36415 COLL VENOUS BLD VENIPUNCTURE: CPT

## 2018-04-16 PROCEDURE — 85025 COMPLETE CBC W/AUTO DIFF WBC: CPT

## 2018-04-16 PROCEDURE — 82306 VITAMIN D 25 HYDROXY: CPT

## 2018-04-16 PROCEDURE — 80053 COMPREHEN METABOLIC PANEL: CPT

## 2018-04-16 RX ORDER — ONDANSETRON 4 MG/1
4 TABLET, FILM COATED ORAL EVERY 8 HOURS PRN
COMMUNITY
End: 2018-06-20 | Stop reason: ALTCHOICE

## 2018-04-16 RX ORDER — OMEPRAZOLE 20 MG/1
40 CAPSULE, DELAYED RELEASE ORAL
Status: ON HOLD | COMMUNITY
End: 2018-05-17

## 2018-04-16 RX ORDER — MULTIVITAMIN
1 TABLET ORAL DAILY
COMMUNITY

## 2018-04-16 RX ORDER — METOCLOPRAMIDE 5 MG/1
5 TABLET ORAL EVERY 6 HOURS PRN
COMMUNITY
End: 2018-09-25

## 2018-04-16 NOTE — PROGRESS NOTES
Joselyn Ruggiero  : 3/1/1945  Age 68year old  female patient is admitted to Facility: [facility] [address] for Rehabilitation and Medical Management.     [address] [place] [address] Drive date:  18  Discharge date to [facility]:  18  ELOS:  15-19 days  Anticipa TEAM: None      CURRENT MEDICATIONS     Current Outpatient Prescriptions:  Ondansetron HCl (ZOFRAN) 4 mg tablet Take 4 mg by mouth every 8 (eight) hours as needed for Nausea.  Disp:  Rfl:    Metoclopramide HCl 5 MG Oral Tab Take 5 mg by mouth 3 (three) time anxiety  HEMATOLOGY:denies hx anemia, denies bruising, denies excessive bleeding  ENDOCRINE: denies excessive thirst or urination; denies unexpected wt gain or wt loss  ALLERGY/IMM.: denies food or seasonal allergies      PHYSICAL EXAM:  GENERAL HEALTH: we Non- Latest Ref Range: >=60  92   GFR, -American Latest Ref Range: >=60  106   ALKALINE PHOSPHATASE Latest Ref Range: 55 - 142 U/L 73   AST (SGOT) Latest Ref Range: 15 - 41 U/L 96 (H)   ALT (SGPT) Latest Ref Range: 14 - 54 U/L 32   T 012 [facility] records, notes, lab and imaging results reviewed. Medication reconciliation completed. SEE PLAN BELOW  S/P left BKA/PVD/OA/Impaired functional mobilityLupus/  Neuropathy/PVD  1. PT/OT to evaluate and treat  2.  Turn q 2 h while

## 2018-04-18 ENCOUNTER — SNF VISIT (OUTPATIENT)
Dept: INTERNAL MEDICINE CLINIC | Age: 73
End: 2018-04-18

## 2018-04-18 VITALS
RESPIRATION RATE: 18 BRPM | TEMPERATURE: 99 F | HEART RATE: 81 BPM | DIASTOLIC BLOOD PRESSURE: 76 MMHG | SYSTOLIC BLOOD PRESSURE: 139 MMHG | OXYGEN SATURATION: 95 %

## 2018-04-18 DIAGNOSIS — F41.8 DEPRESSION WITH ANXIETY: ICD-10-CM

## 2018-04-18 DIAGNOSIS — R14.0 ABDOMINAL BLOATING: ICD-10-CM

## 2018-04-18 DIAGNOSIS — R53.1 WEAKNESS: ICD-10-CM

## 2018-04-18 DIAGNOSIS — Z89.512 S/P BKA (BELOW KNEE AMPUTATION) UNILATERAL, LEFT (HCC): Primary | ICD-10-CM

## 2018-04-18 DIAGNOSIS — R11.0 NAUSEA: ICD-10-CM

## 2018-04-18 DIAGNOSIS — R50.9 FEVER, UNSPECIFIED FEVER CAUSE: ICD-10-CM

## 2018-04-18 PROCEDURE — 99309 SBSQ NF CARE MODERATE MDM 30: CPT | Performed by: NURSE PRACTITIONER

## 2018-04-18 RX ORDER — MIRTAZAPINE 7.5 MG/1
7.5 TABLET, FILM COATED ORAL NIGHTLY
COMMUNITY
End: 2018-06-20

## 2018-04-18 RX ORDER — ERGOCALCIFEROL (VITAMIN D2) 1250 MCG
CAPSULE ORAL WEEKLY
COMMUNITY
Start: 2018-04-11 | End: 2018-05-03 | Stop reason: ALTCHOICE

## 2018-04-18 RX ORDER — SIMETHICONE 80 MG
80 TABLET,CHEWABLE ORAL EVERY 6 HOURS PRN
COMMUNITY
End: 2018-09-25 | Stop reason: ALTCHOICE

## 2018-04-18 RX ORDER — CALCIUM CARBONATE/VITAMIN D3 600 MG-10
1 TABLET ORAL 2 TIMES DAILY
COMMUNITY
End: 2018-05-03 | Stop reason: ALTCHOICE

## 2018-04-18 NOTE — PROGRESS NOTES
Dona Norton, 11/1945, 68year old, female    Chief Complaint:  Patient presents with: Follow - Up: s/p Left BKA  Nausea  Fever  Weakness       Subjective:  PMH significant for Thyroid nodules, COPD, SHYAM, CAD, PVD, DVT, Lupus, PN, and OA.   In [facility] s/p l saturation  CARDIOVASCULAR: S1, S2 normal, RRR; no S3, no S4; , no click, no murmur  ABDOMEN:  normal active BS+, soft, nondistended; no organomegaly, no masses; no bruits; nontender, no guarding, no rebound tenderness.   :Deferred  LYMPHATIC:no lymphedem 30 mg daily  3. Add Mirtazapine 7.5 mg q HS  4. Psych consult     Labs:  1. CBC and CMP q weekly     Supplements  1. MVI 1 tablet q daily     F/U Appointments  1. Paulette Carrasco DO on 4/20  2. Dr. Tl Castellanos on 4/27  3.  Dr. Mis Rice no

## 2018-04-20 ENCOUNTER — SNF VISIT (OUTPATIENT)
Dept: INTERNAL MEDICINE CLINIC | Age: 73
End: 2018-04-20

## 2018-04-20 VITALS
RESPIRATION RATE: 18 BRPM | DIASTOLIC BLOOD PRESSURE: 77 MMHG | HEART RATE: 116 BPM | SYSTOLIC BLOOD PRESSURE: 143 MMHG | OXYGEN SATURATION: 97 % | TEMPERATURE: 99 F

## 2018-04-20 DIAGNOSIS — Z89.512 S/P BKA (BELOW KNEE AMPUTATION) UNILATERAL, LEFT (HCC): Primary | ICD-10-CM

## 2018-04-20 DIAGNOSIS — R11.0 NAUSEA: ICD-10-CM

## 2018-04-20 PROCEDURE — 99307 SBSQ NF CARE SF MDM 10: CPT | Performed by: NURSE PRACTITIONER

## 2018-04-20 NOTE — PROGRESS NOTES
Thad Gutierrez, 11/1945, 68year old, female    Chief Complaint:  Patient presents with: Follow - Up: s/p Left BKA       Subjective:  PMH significant for Thyroid nodules, COPD, SHYAM, CAD, PVD, DVT, Lupus, PN, and OA.   In [facility] s/p left BKA on 4.9.18 by Yes    Diagnostics reviewed:    4.18.18  KUB:  Unremarkable bowel gas pattern w/ mild stool. Assessment and plan:  S/P left BKA/PVD/OA/Impaired functional mobilityLupus/  Neuropathy/PVD  1. PT/OT to evaluate and treat  2. Turn q 2 h while in bed  3.  IS

## 2018-04-21 ENCOUNTER — SNF ADMIT/H&P (OUTPATIENT)
Dept: FAMILY MEDICINE CLINIC | Facility: CLINIC | Age: 73
End: 2018-04-21

## 2018-04-21 DIAGNOSIS — J44.9 CHRONIC OBSTRUCTIVE PULMONARY DISEASE, UNSPECIFIED COPD TYPE (HCC): ICD-10-CM

## 2018-04-21 DIAGNOSIS — F17.219 CIGARETTE NICOTINE DEPENDENCE WITH NICOTINE-INDUCED DISORDER: ICD-10-CM

## 2018-04-21 DIAGNOSIS — Z86.718 HISTORY OF BLOOD CLOTS: ICD-10-CM

## 2018-04-21 DIAGNOSIS — M32.9 SYSTEMIC LUPUS ERYTHEMATOSUS, UNSPECIFIED SLE TYPE, UNSPECIFIED ORGAN INVOLVEMENT STATUS (HCC): ICD-10-CM

## 2018-04-21 DIAGNOSIS — R53.81 PHYSICAL DECONDITIONING: ICD-10-CM

## 2018-04-21 DIAGNOSIS — G47.30 SLEEP APNEA, UNSPECIFIED TYPE: Chronic | ICD-10-CM

## 2018-04-21 DIAGNOSIS — R53.1 GENERALIZED WEAKNESS: ICD-10-CM

## 2018-04-21 DIAGNOSIS — K59.00 CONSTIPATION, UNSPECIFIED CONSTIPATION TYPE: ICD-10-CM

## 2018-04-21 DIAGNOSIS — I99.8 VASCULAR OCCLUSION: ICD-10-CM

## 2018-04-21 DIAGNOSIS — F41.9 ANXIETY DISORDER, UNSPECIFIED TYPE: ICD-10-CM

## 2018-04-21 DIAGNOSIS — I73.9 PERIPHERAL VASCULAR DISEASE OF EXTREMITY WITH CLAUDICATION (HCC): Primary | ICD-10-CM

## 2018-04-21 PROCEDURE — 99306 1ST NF CARE HIGH MDM 50: CPT | Performed by: FAMILY MEDICINE

## 2018-04-22 NOTE — DISCHARGE SUMMARY
Vascular Surgery  Surgical Discharge Summary    Admission Date: 4/9/2018   D/C Date: 4/14/2018  Discharge Diagnosis: status post below knee amp  Discharge Condition: good      HISTORY OF PRESENT ILLNESS: Tracy Joyce is a 68year old  female needed for FLATULENCE. Disp:  Rfl:    Ondansetron HCl (ZOFRAN) 4 mg tablet Take 4 mg by mouth every 8 (eight) hours as needed for Nausea. Disp:  Rfl:    Metoclopramide HCl 5 MG Oral Tab Take 5 mg by mouth 3 (three) times daily before meals.  Disp:  Rfl:

## 2018-04-23 ENCOUNTER — MED REC SCAN ONLY (OUTPATIENT)
Dept: FAMILY MEDICINE CLINIC | Facility: CLINIC | Age: 73
End: 2018-04-23

## 2018-04-23 ENCOUNTER — SNF VISIT (OUTPATIENT)
Dept: INTERNAL MEDICINE CLINIC | Age: 73
End: 2018-04-23

## 2018-04-23 VITALS
HEART RATE: 86 BPM | TEMPERATURE: 98 F | RESPIRATION RATE: 20 BRPM | OXYGEN SATURATION: 96 % | DIASTOLIC BLOOD PRESSURE: 70 MMHG | SYSTOLIC BLOOD PRESSURE: 134 MMHG

## 2018-04-23 DIAGNOSIS — G62.9 NEUROPATHY: ICD-10-CM

## 2018-04-23 DIAGNOSIS — Z89.512 S/P BKA (BELOW KNEE AMPUTATION) UNILATERAL, LEFT (HCC): Primary | ICD-10-CM

## 2018-04-23 DIAGNOSIS — R11.0 NAUSEA: ICD-10-CM

## 2018-04-23 PROBLEM — M32.9 SYSTEMIC LUPUS ERYTHEMATOSUS (HCC): Status: ACTIVE | Noted: 2018-04-23

## 2018-04-23 PROCEDURE — 99308 SBSQ NF CARE LOW MDM 20: CPT | Performed by: NURSE PRACTITIONER

## 2018-04-23 NOTE — PROGRESS NOTES
Hans Duncan, 11/1945, 68year old, female    Chief Complaint:  Patient presents with: Follow - Up: s/p Left BKA  Neuropathy       Subjective:  PMH significant for Thyroid nodules, COPD, SHYAM, CAD, PVD, DVT, Lupus, PN, and OA.   In [facility] s/p left BKA o weak  NEUROLOGIC: intact; no sensorimotor deficit, reflexes normal, cranial nerves intact II-XII, follows commands  PSYCHIATRIC: alert and oriented x 3; affect appropriate    Medications reviewed: Yes    Diagnostics reviewed:  No new results available for

## 2018-04-24 ENCOUNTER — NURSE ONLY (OUTPATIENT)
Dept: LAB | Age: 73
End: 2018-04-24
Attending: FAMILY MEDICINE
Payer: MEDICARE

## 2018-04-24 DIAGNOSIS — M62.81 MUSCLE WEAKNESS (GENERALIZED): Primary | ICD-10-CM

## 2018-04-24 PROCEDURE — 36415 COLL VENOUS BLD VENIPUNCTURE: CPT

## 2018-04-24 PROCEDURE — 80053 COMPREHEN METABOLIC PANEL: CPT

## 2018-04-24 PROCEDURE — 85025 COMPLETE CBC W/AUTO DIFF WBC: CPT

## 2018-04-24 PROCEDURE — 83735 ASSAY OF MAGNESIUM: CPT

## 2018-04-24 NOTE — PROGRESS NOTES
[facility] [facility]el[facility] Author: Jennifer Bocanegra MD     3/1/1945 MRN [id number]   [facility]  Admission 18      Last Hospital Discharge 18 PCP No primary care provider on file.    Hospital of Discharge 18       Date has no new skin lesions. Procedures: s/p BKA left side. Allergies:  She is allergic to pcns [penicillins]. Current Meds:    Current Outpatient Prescriptions on File Prior to Visit:  ergocalciferol 94134 units Oral Cap Take by mouth once a week. includes other (RT HIP); foot surgery; and cath drug eluting stent. She family history includes Diabetes in her mother. She  reports that she has been smoking. She has a 56.00 pack-year smoking history.  She has never used smokeless tobacco. She repor reflexes. She displays normal reflexes. No cranial nerve deficit. She exhibits normal muscle tone. Coordination normal.   Skin: Skin is warm and dry. No rash noted. She is not diaphoretic. No erythema. No pallor.    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      Dictated by:

## 2018-04-25 ENCOUNTER — SNF VISIT (OUTPATIENT)
Dept: INTERNAL MEDICINE CLINIC | Age: 73
End: 2018-04-25

## 2018-04-25 VITALS
HEART RATE: 87 BPM | TEMPERATURE: 98 F | SYSTOLIC BLOOD PRESSURE: 140 MMHG | RESPIRATION RATE: 19 BRPM | DIASTOLIC BLOOD PRESSURE: 71 MMHG | OXYGEN SATURATION: 93 %

## 2018-04-25 DIAGNOSIS — R11.0 NAUSEA: ICD-10-CM

## 2018-04-25 DIAGNOSIS — Z89.512 S/P BKA (BELOW KNEE AMPUTATION) UNILATERAL, LEFT (HCC): Primary | ICD-10-CM

## 2018-04-25 DIAGNOSIS — R53.1 WEAKNESS: ICD-10-CM

## 2018-04-25 DIAGNOSIS — G62.9 NEUROPATHY: ICD-10-CM

## 2018-04-25 PROCEDURE — 99307 SBSQ NF CARE SF MDM 10: CPT | Performed by: NURSE PRACTITIONER

## 2018-04-25 NOTE — PROGRESS NOTES
Jerry Layne, 11/1945, 68year old, female    Chief Complaint:  Patient presents with: Follow - Up: s/p Left BKA  Weakness       Subjective:  PMH significant for Thyroid nodules, COPD, SHYAM, CAD, PVD, DVT, Lupus, PN, and OA.   In [facility] s/p left BKA on extremity  EXTREMITIES/VASCULAR:no cyanosis, clubbing or edema;+right pedal pulse weak  NEUROLOGIC: intact; no sensorimotor deficit, reflexes normal, cranial nerves intact II-XII, follows commands  PSYCHIATRIC: alert and oriented x 3; affect appropriate chew QID prn    Anorexia  1. Mirtazapine 7.5 mg q HS    Vitamin D deficiency  1. Ergocalciferol 50,000u 1x/wk x 8 wks/6.13.18  2. Calcium and vitamin D 600mg/400u BID  3. Repeat vitamin D in 10 wks     Bowel Regime  1. Colace 100 mg bid  2.  Miralax 17 gm q

## 2018-04-25 NOTE — OPERATIVE REPORT
Pre-Operative Diagnosis: Atherosclerosis with rest pain and ulcer      Post-Operative Diagnosis: same     Procedure Performed:   1.  Left below knee amputation with posterior flap    Surgeon(s) and Role:     Jenny Friedman MD - Primary    Assistant(s): flap.  I then used the saw on the rasp to bevel the anterior edge of the tibia and fibula and all the edges of the tibia. We then used silk ligatures identified all obvious tibial vessels which were completely calcified and occluded and ligated them.   All

## 2018-04-27 PROBLEM — Z89.512 S/P BKA (BELOW KNEE AMPUTATION) UNILATERAL, LEFT (HCC): Status: ACTIVE | Noted: 2018-04-27

## 2018-04-30 ENCOUNTER — NURSE ONLY (OUTPATIENT)
Dept: LAB | Age: 73
End: 2018-04-30
Attending: FAMILY MEDICINE
Payer: MEDICARE

## 2018-04-30 ENCOUNTER — SNF VISIT (OUTPATIENT)
Dept: INTERNAL MEDICINE CLINIC | Age: 73
End: 2018-04-30

## 2018-04-30 VITALS
OXYGEN SATURATION: 93 % | HEART RATE: 79 BPM | DIASTOLIC BLOOD PRESSURE: 71 MMHG | SYSTOLIC BLOOD PRESSURE: 105 MMHG | TEMPERATURE: 97 F | RESPIRATION RATE: 18 BRPM

## 2018-04-30 DIAGNOSIS — G62.9 NEUROPATHY: ICD-10-CM

## 2018-04-30 DIAGNOSIS — R11.0 NAUSEA: ICD-10-CM

## 2018-04-30 DIAGNOSIS — R53.1 WEAKNESS: Primary | ICD-10-CM

## 2018-04-30 DIAGNOSIS — Z89.512 S/P BKA (BELOW KNEE AMPUTATION) UNILATERAL, LEFT (HCC): Primary | ICD-10-CM

## 2018-04-30 PROCEDURE — 36415 COLL VENOUS BLD VENIPUNCTURE: CPT

## 2018-04-30 PROCEDURE — 85025 COMPLETE CBC W/AUTO DIFF WBC: CPT

## 2018-04-30 PROCEDURE — 99308 SBSQ NF CARE LOW MDM 20: CPT | Performed by: NURSE PRACTITIONER

## 2018-04-30 PROCEDURE — 80053 COMPREHEN METABOLIC PANEL: CPT

## 2018-04-30 RX ORDER — GABAPENTIN 400 MG/1
400 CAPSULE ORAL 3 TIMES DAILY
COMMUNITY
End: 2018-06-20

## 2018-04-30 NOTE — PROGRESS NOTES
Hermila Grande, 11/1945, 68year old, female    Chief Complaint:  Patient presents with: Follow - Up: s/p Left BKA  Neuropathy       Subjective:  PMH significant for Thyroid nodules, COPD, SHYAM, CAD, PVD, DVT, Lupus, PN, and OA.   In [facility] s/p left BKA o no guarding, no rebound tenderness.   :Deferred  LYMPHATIC:no lymphedema  MUSCULOSKELETAL: no acute synovitis upper or lower extremity  EXTREMITIES/VASCULAR:no cyanosis, clubbing or edema;+right pedal pulse weak, color good, no cyanosis/necrosis  NEUROLOG Nicotine patch-refused     H/O DVT  1. Monitor     Thyroid d/o (nodules)  1. Monitor     Nausea/Heartburn/Abd bloating  1. Zofran 4 mg q6h prn  2. Reglan 5 mg tid prn  3. Omeprazole 40 mg qd  4. Simethicone 80 mg chew QID prn    Anorexia  1. Mirtazapine 7.

## 2018-05-02 ENCOUNTER — SNF VISIT (OUTPATIENT)
Dept: INTERNAL MEDICINE CLINIC | Age: 73
End: 2018-05-02

## 2018-05-02 VITALS
HEART RATE: 89 BPM | DIASTOLIC BLOOD PRESSURE: 53 MMHG | TEMPERATURE: 98 F | SYSTOLIC BLOOD PRESSURE: 114 MMHG | RESPIRATION RATE: 18 BRPM | OXYGEN SATURATION: 98 %

## 2018-05-02 DIAGNOSIS — G62.9 NEUROPATHY: ICD-10-CM

## 2018-05-02 DIAGNOSIS — R53.1 WEAKNESS: ICD-10-CM

## 2018-05-02 DIAGNOSIS — Z89.512 S/P BKA (BELOW KNEE AMPUTATION) UNILATERAL, LEFT (HCC): Primary | ICD-10-CM

## 2018-05-02 PROBLEM — T87.40 AMPUTATION STUMP INFECTION (HCC): Status: ACTIVE | Noted: 2018-05-02

## 2018-05-02 PROBLEM — L03.90 CELLULITIS: Status: ACTIVE | Noted: 2018-05-02

## 2018-05-02 PROCEDURE — 99308 SBSQ NF CARE LOW MDM 20: CPT | Performed by: NURSE PRACTITIONER

## 2018-05-02 NOTE — PROGRESS NOTES
Broderick Cheung, 11/1945, 68year old, female    Chief Complaint:  Patient presents with: Follow - Up: s/p Left BKA  Weakness       Subjective:  PMH significant for Thyroid nodules, COPD, SHYAM, CAD, PVD, DVT, Lupus, PN, and OA.   In [facility] s/p left BKA on organomegaly, no masses; no bruits; nontender, no guarding, no rebound tenderness.   :Deferred  LYMPHATIC:no lymphedema  MUSCULOSKELETAL: no acute synovitis upper or lower extremity  EXTREMITIES/VASCULAR:no cyanosis, clubbing or edema;+right pedal pulse w prn    Depression/Anxiety  1. Monitor mood  2. Cymbalta 30 mg daily  3. Mirtazapine 7.5 mg q HS  4. Psych consult     Labs:  1. CBC and CMP q weekly     Supplements  1. MVI 1 tablet q daily     F/U Appointments  1. Chema Solid, DO prn  2.  Dr. Jacobo Bell

## 2018-05-03 RX ORDER — HEPARIN SODIUM 5000 [USP'U]/ML
5000 INJECTION, SOLUTION INTRAVENOUS; SUBCUTANEOUS ONCE
Status: CANCELLED | OUTPATIENT
Start: 2018-05-03 | End: 2018-05-03

## 2018-05-03 RX ORDER — POLYETHYLENE GLYCOL 3350 17 G/17G
17 POWDER, FOR SOLUTION ORAL DAILY PRN
COMMUNITY
End: 2018-09-07 | Stop reason: ALTCHOICE

## 2018-05-03 RX ORDER — ACETAMINOPHEN 325 MG/1
650 TABLET ORAL EVERY 4 HOURS PRN
COMMUNITY
End: 2018-05-09

## 2018-05-04 ENCOUNTER — ANESTHESIA EVENT (OUTPATIENT)
Dept: SURGERY | Facility: HOSPITAL | Age: 73
DRG: 463 | End: 2018-05-04
Payer: MEDICARE

## 2018-05-04 ENCOUNTER — ANESTHESIA (OUTPATIENT)
Dept: SURGERY | Facility: HOSPITAL | Age: 73
DRG: 463 | End: 2018-05-04
Payer: MEDICARE

## 2018-05-04 ENCOUNTER — HOSPITAL ENCOUNTER (INPATIENT)
Facility: HOSPITAL | Age: 73
LOS: 4 days | Discharge: NURSING, REHAB OR LONG-TERM CARE | DRG: 463 | End: 2018-05-08
Attending: SURGERY | Admitting: SURGERY
Payer: MEDICARE

## 2018-05-04 ENCOUNTER — SURGERY (OUTPATIENT)
Age: 73
End: 2018-05-04

## 2018-05-04 PROCEDURE — 99223 1ST HOSP IP/OBS HIGH 75: CPT | Performed by: HOSPITALIST

## 2018-05-04 PROCEDURE — 0JBP0ZZ EXCISION OF LEFT LOWER LEG SUBCUTANEOUS TISSUE AND FASCIA, OPEN APPROACH: ICD-10-PCS | Performed by: SURGERY

## 2018-05-04 RX ORDER — HYDROCODONE BITARTRATE AND ACETAMINOPHEN 10; 325 MG/1; MG/1
2 TABLET ORAL AS NEEDED
Status: DISCONTINUED | OUTPATIENT
Start: 2018-05-04 | End: 2018-05-04 | Stop reason: HOSPADM

## 2018-05-04 RX ORDER — ONDANSETRON 4 MG/1
4 TABLET, FILM COATED ORAL EVERY 8 HOURS PRN
Status: DISCONTINUED | OUTPATIENT
Start: 2018-05-04 | End: 2018-05-08

## 2018-05-04 RX ORDER — CLINDAMYCIN PHOSPHATE 900 MG/50ML
INJECTION INTRAVENOUS
Status: COMPLETED
Start: 2018-05-04 | End: 2018-05-04

## 2018-05-04 RX ORDER — SODIUM CHLORIDE, SODIUM LACTATE, POTASSIUM CHLORIDE, CALCIUM CHLORIDE 600; 310; 30; 20 MG/100ML; MG/100ML; MG/100ML; MG/100ML
INJECTION, SOLUTION INTRAVENOUS CONTINUOUS
Status: DISCONTINUED | OUTPATIENT
Start: 2018-05-04 | End: 2018-05-04

## 2018-05-04 RX ORDER — ONDANSETRON 2 MG/ML
4 INJECTION INTRAMUSCULAR; INTRAVENOUS EVERY 6 HOURS PRN
Status: DISCONTINUED | OUTPATIENT
Start: 2018-05-04 | End: 2018-05-08

## 2018-05-04 RX ORDER — ACETAMINOPHEN 500 MG
500 TABLET ORAL EVERY 6 HOURS PRN
Status: ON HOLD | COMMUNITY
End: 2018-05-17

## 2018-05-04 RX ORDER — HYDROCODONE BITARTRATE AND ACETAMINOPHEN 10; 325 MG/1; MG/1
2 TABLET ORAL EVERY 6 HOURS PRN
Status: DISCONTINUED | OUTPATIENT
Start: 2018-05-04 | End: 2018-05-08

## 2018-05-04 RX ORDER — MIDAZOLAM HYDROCHLORIDE 1 MG/ML
1 INJECTION INTRAMUSCULAR; INTRAVENOUS EVERY 5 MIN PRN
Status: DISCONTINUED | OUTPATIENT
Start: 2018-05-04 | End: 2018-05-04 | Stop reason: HOSPADM

## 2018-05-04 RX ORDER — SULFAMETHOXAZOLE AND TRIMETHOPRIM 800; 160 MG/1; MG/1
1 TABLET ORAL 2 TIMES DAILY
Status: ON HOLD | COMMUNITY
End: 2018-05-07

## 2018-05-04 RX ORDER — ONDANSETRON 2 MG/ML
4 INJECTION INTRAMUSCULAR; INTRAVENOUS AS NEEDED
Status: DISCONTINUED | OUTPATIENT
Start: 2018-05-04 | End: 2018-05-04 | Stop reason: HOSPADM

## 2018-05-04 RX ORDER — GABAPENTIN 400 MG/1
400 CAPSULE ORAL 3 TIMES DAILY
Status: DISCONTINUED | OUTPATIENT
Start: 2018-05-04 | End: 2018-05-08

## 2018-05-04 RX ORDER — MEPERIDINE HYDROCHLORIDE 25 MG/ML
12.5 INJECTION INTRAMUSCULAR; INTRAVENOUS; SUBCUTANEOUS AS NEEDED
Status: DISCONTINUED | OUTPATIENT
Start: 2018-05-04 | End: 2018-05-04 | Stop reason: HOSPADM

## 2018-05-04 RX ORDER — NALOXONE HYDROCHLORIDE 0.4 MG/ML
80 INJECTION, SOLUTION INTRAMUSCULAR; INTRAVENOUS; SUBCUTANEOUS AS NEEDED
Status: DISCONTINUED | OUTPATIENT
Start: 2018-05-04 | End: 2018-05-04 | Stop reason: HOSPADM

## 2018-05-04 RX ORDER — HYDROMORPHONE HYDROCHLORIDE 1 MG/ML
0.5 INJECTION, SOLUTION INTRAMUSCULAR; INTRAVENOUS; SUBCUTANEOUS EVERY 2 HOUR PRN
Status: DISCONTINUED | OUTPATIENT
Start: 2018-05-04 | End: 2018-05-08

## 2018-05-04 RX ORDER — SODIUM CHLORIDE, SODIUM LACTATE, POTASSIUM CHLORIDE, CALCIUM CHLORIDE 600; 310; 30; 20 MG/100ML; MG/100ML; MG/100ML; MG/100ML
INJECTION, SOLUTION INTRAVENOUS CONTINUOUS
Status: DISCONTINUED | OUTPATIENT
Start: 2018-05-04 | End: 2018-05-04 | Stop reason: HOSPADM

## 2018-05-04 RX ORDER — BACITRACIN 50000 [USP'U]/1
INJECTION, POWDER, LYOPHILIZED, FOR SOLUTION INTRAMUSCULAR AS NEEDED
Status: DISCONTINUED | OUTPATIENT
Start: 2018-05-04 | End: 2018-05-04 | Stop reason: HOSPADM

## 2018-05-04 RX ORDER — ACETAMINOPHEN 500 MG
1000 TABLET ORAL EVERY 6 HOURS PRN
Status: ON HOLD | COMMUNITY
End: 2018-05-17

## 2018-05-04 RX ORDER — CLINDAMYCIN PHOSPHATE 900 MG/50ML
900 INJECTION INTRAVENOUS ONCE
Status: DISCONTINUED | OUTPATIENT
Start: 2018-05-04 | End: 2018-05-04 | Stop reason: HOSPADM

## 2018-05-04 RX ORDER — HYDROCODONE BITARTRATE AND ACETAMINOPHEN 5; 325 MG/1; MG/1
1 TABLET ORAL EVERY 4 HOURS PRN
Status: DISCONTINUED | OUTPATIENT
Start: 2018-05-04 | End: 2018-05-08

## 2018-05-04 RX ORDER — HYDROMORPHONE HYDROCHLORIDE 1 MG/ML
1 INJECTION, SOLUTION INTRAMUSCULAR; INTRAVENOUS; SUBCUTANEOUS EVERY 2 HOUR PRN
Status: DISCONTINUED | OUTPATIENT
Start: 2018-05-04 | End: 2018-05-08

## 2018-05-04 RX ORDER — CLINDAMYCIN PHOSPHATE 900 MG/50ML
INJECTION INTRAVENOUS
Status: DISCONTINUED | OUTPATIENT
Start: 2018-05-04 | End: 2018-05-04

## 2018-05-04 RX ORDER — DULOXETIN HYDROCHLORIDE 30 MG/1
30 CAPSULE, DELAYED RELEASE ORAL DAILY
Status: DISCONTINUED | OUTPATIENT
Start: 2018-05-05 | End: 2018-05-08

## 2018-05-04 RX ORDER — HYDROCODONE BITARTRATE AND ACETAMINOPHEN 10; 325 MG/1; MG/1
1 TABLET ORAL EVERY 4 HOURS PRN
Status: DISCONTINUED | OUTPATIENT
Start: 2018-05-04 | End: 2018-05-08

## 2018-05-04 RX ORDER — HYDROMORPHONE HYDROCHLORIDE 1 MG/ML
0.4 INJECTION, SOLUTION INTRAMUSCULAR; INTRAVENOUS; SUBCUTANEOUS EVERY 5 MIN PRN
Status: DISCONTINUED | OUTPATIENT
Start: 2018-05-04 | End: 2018-05-04 | Stop reason: HOSPADM

## 2018-05-04 RX ORDER — DULOXETIN HYDROCHLORIDE 30 MG/1
30 CAPSULE, DELAYED RELEASE ORAL DAILY
COMMUNITY
End: 2018-05-16 | Stop reason: DRUGHIGH

## 2018-05-04 RX ORDER — PANTOPRAZOLE SODIUM 40 MG/1
40 TABLET, DELAYED RELEASE ORAL
Status: DISCONTINUED | OUTPATIENT
Start: 2018-05-05 | End: 2018-05-08

## 2018-05-04 RX ORDER — HYDROCODONE BITARTRATE AND ACETAMINOPHEN 10; 325 MG/1; MG/1
1 TABLET ORAL AS NEEDED
Status: DISCONTINUED | OUTPATIENT
Start: 2018-05-04 | End: 2018-05-04 | Stop reason: HOSPADM

## 2018-05-04 RX ORDER — HYDROMORPHONE HYDROCHLORIDE 1 MG/ML
INJECTION, SOLUTION INTRAMUSCULAR; INTRAVENOUS; SUBCUTANEOUS
Status: COMPLETED
Start: 2018-05-04 | End: 2018-05-04

## 2018-05-04 RX ORDER — MIRTAZAPINE 15 MG/1
7.5 TABLET, FILM COATED ORAL NIGHTLY
Status: DISCONTINUED | OUTPATIENT
Start: 2018-05-04 | End: 2018-05-08

## 2018-05-04 NOTE — ANESTHESIA PREPROCEDURE EVALUATION
PRE-OP EVALUATION    Patient Name: Brenda Villasenor    Pre-op Diagnosis: AMPUTATION STUMP INFECTION    Procedure(s):  DEBRIDEMENT LEFT BELOW THE KNEE AMPUTATION    Surgeon(s) and Role:     Saira Martinez MD - Primary    Pre-op vitals reviewed.   Temp: MG Oral Tab Take 2 tablets by mouth every 6 (six) hours as needed for Pain. Disp: 30 tablet Rfl: 0       Allergies: Pcns [Penicillins]      Anesthesia Evaluation    Patient summary reviewed.     Anesthetic Complications  (-) history of anesthetic complicati range. There was no  significant regurgitation. Pericardium: Sharlee Negrete was no pericardial effusion. Aorta:  Aortic root: The aortic root was normal.  Ascending aorta:  The ascending aorta was normal.  Pulmonary artery:   The main pulmonary artery was normal- Pulmonary    Pulmonary exam normal.  Breath sounds clear to auscultation bilaterally. Other findings            ASA: 3   Plan: general  NPO status verified and patient meets guidelines.         Comment:   I explained intrinsic risks of general

## 2018-05-04 NOTE — ANESTHESIA PREPROCEDURE EVALUATION
PRE-OP EVALUATION    Patient Name: Erik Patiño    Pre-op Diagnosis: AMPUTATION STUMP INFECTION    Procedure(s):  DEBRIDEMENT LEFT BELOW THE KNEE AMPUTATION    Surgeon(s) and Role:     Bailee Mcintosh MD - Primary    Pre-op vitals reviewed.   Temp: MG Oral Tab Take 2 tablets by mouth every 6 (six) hours as needed for Pain.  Disp: 30 tablet Rfl: 0       Allergies: Pcns [Penicillins]      Anesthesia Evaluation  Past Surgical History:  No date: CATH DRUG ELUTING STENT  No date: FOOT SURGERY  RT HIP: OTHE

## 2018-05-04 NOTE — PROGRESS NOTES
Pressure sore x 2 to buttocks per patient and daughter. Two dressings changed and applied [place] [facility] [place] Unable to assess sites at this time.

## 2018-05-04 NOTE — ANESTHESIA POSTPROCEDURE EVALUATION
[address] [place] [address] Patient Status:  Surgery Admit   Age/Gender 68year old female MRN [id number]   [facility] SURGERY Attending Heike Muniz MD   Hosp Day # 0 PCP No primary care provider on file.        Anesthesia Post-o

## 2018-05-04 NOTE — H&P
No new update to office note from 5/2  Indications, risks, benefits discussed.    Plan for debridement of left leg BKA  Leg marked appropriately

## 2018-05-04 NOTE — BRIEF OP NOTE
Pre-Operative Diagnosis: decubitus of left below knee amputation     Post-Operative Diagnosis: same     Procedure Performed:   Debridement of skin and subcutaneous fat 4 cm square with knife    Surgeon(s) and Role:     Virgilio Mathews MD - Primary    As

## 2018-05-05 PROCEDURE — 99232 SBSQ HOSP IP/OBS MODERATE 35: CPT | Performed by: STUDENT IN AN ORGANIZED HEALTH CARE EDUCATION/TRAINING PROGRAM

## 2018-05-05 RX ORDER — SODIUM CHLORIDE 9 MG/ML
INJECTION, SOLUTION INTRAVENOUS CONTINUOUS
Status: DISCONTINUED | OUTPATIENT
Start: 2018-05-05 | End: 2018-05-06

## 2018-05-05 RX ORDER — DEXTROSE MONOHYDRATE 25 G/50ML
50 INJECTION, SOLUTION INTRAVENOUS
Status: DISCONTINUED | OUTPATIENT
Start: 2018-05-05 | End: 2018-05-08

## 2018-05-05 RX ORDER — SIMETHICONE 80 MG
80 TABLET,CHEWABLE ORAL EVERY 6 HOURS PRN
Status: DISCONTINUED | OUTPATIENT
Start: 2018-05-05 | End: 2018-05-08

## 2018-05-05 NOTE — CM/SW NOTE
05/05/18 1000   CM/SW Screening   Referral [id number] [facility] staff; Chart review   Patient's Mental Status Alert;Oriented   Patient's [address] [facility] Name [facility]   Discharge Needs   Anticipat

## 2018-05-05 NOTE — CONSULTS
120 [facility] dosing service    Initial Pharmacokinetic Consult for Vancomycin Dosing     Thad Whitley is a 68year old female admitted on 5/4/18 who is being treated for cellulitis. Pharmacy has been asked to dose Vancomycin by Dr. Theo Fofana.     She is level(s) prior to 4th dose. Goal trough level 10-15 ug/mL. 3.  Pharmacy will need BUN/Scr daily while on Vancomycin to assess renal function. 4.  Pharmacy will follow and monitor renal function changes, toxicity and efficacy.     Pharmacy will juan

## 2018-05-05 NOTE — PROGRESS NOTES
No complaints  Wound vac secure  [facility] Monday [facility]

## 2018-05-05 NOTE — CONSULTS
[facility]  [address] [facility] Patient Status:  Surgery Admit    3/1/1945 MRN [id number]   Location [address] [facility] Attending William Garcia MD   [facility] Day # 0 PCP No primary care provider on file.      Cassie Mohs times daily. Disp:  Rfl:    mirtazapine 7.5 MG Oral Tab Take 7.5 mg by mouth nightly. Disp:  Rfl:    Ondansetron HCl (ZOFRAN) 4 mg tablet Take 4 mg by mouth every 8 (eight) hours as needed for Nausea.  Disp:  Rfl:    Metoclopramide HCl 5 MG Oral Tab Take 5 all extremities. Extremities: Left BKA with dressing and drain in place  Integument: No rashes or lesions. Psychiatric: Appropriate mood and affect.       Diagnostic Data:      Labs:  Recent Labs   Lab  05/04/18   1456   INR  1.27*       No results for i

## 2018-05-05 NOTE — PROGRESS NOTES
[facility]ITAL[facility]  Progress Note     Cris Boyle Patient Status:  Outpatient in a Bed    3/1/1945 MRN [id number]   [facility] 3SW-A Attending Parish Lee MD   Hosp Day # 0 PCP No primary care provider on file.      Chief Complai stable  4. Remote h/o Lupus, not on meds  5. h/o VTE  6. PVD  7. Hypothyroid  1.  Continue levothyroxine     Plan of care:   BS have been elevated, check A1c, ISS      Quality:  · DVT Prophylaxis: SCD  · CODE status: full  · Kessler: no  · Central line: no

## 2018-05-05 NOTE — PHYSICAL THERAPY NOTE
PHYSICAL THERAPY EVALUATION - INPATIENT     Room Number: 370/370-A  Evaluation Date: 5/5/2018  Type of Evaluation: Initial  Physician Order: PT Eval and Treat    Presenting Problem: decubitus ulcer of left residual limb s/p debridement (5/4/18)  Reas reports prior to left BKA, she was independent with household ambulation without use of assistive device and was modified independent with use of rollator for community ambulation.  Pt states that in rehab she was able to complete transfers without assistiv Approx Degree of Impairment Score: 76.75%   Standardized Score (AM-PAC Scale): 32.29   CMS Modifier (G-Code): CL    FUNCTIONAL ABILITY STATUS  Gait Assessment   Gait Assistance: Not tested           Stoop/Curb Assistance: Not tested       Skilled Therapy P baseline and would benefit from skilled inpatient PT to address the above deficits to assist patient in returning to prior to level of function. Subacute rehab is recommended for 12-14 days.   After this period of rehabilitation patient should achieve cristal

## 2018-05-06 PROCEDURE — 99232 SBSQ HOSP IP/OBS MODERATE 35: CPT | Performed by: STUDENT IN AN ORGANIZED HEALTH CARE EDUCATION/TRAINING PROGRAM

## 2018-05-06 NOTE — OCCUPATIONAL THERAPY NOTE
OCCUPATIONAL THERAPY EVALUATION - INPATIENT     Room Number: 370/370-A  Evaluation Date: 5/6/2018  Type of Evaluation: Initial  Presenting Problem: RLE I&D    Physician Order: IP Consult to Occupational Therapy  Reason for Therapy: ADL/IADL Dysfunction and of Function: Pt reports prior to left BKA, she was independent with household ambulation without use of assistive device and was modified independent with use of rollator for community ambulation.  Pt states that in rehab she was able to complete transfers clothing?: A Little  -   Taking care of personal grooming such as brushing teeth?: None  -   Eating meals?: None    AM-PAC Score:  Score: 17  Approx Degree of Impairment: 50.11%  Standardized Score (AM-PAC Scale): 37.26  CMS Modifier (G-Code): NELY JAVIER occupational profile, detailed assessments, several treatment options    Overall Complexity MODERATE     OT Discharge Recommendations: Sub-acute rehabilitation  OT Device Recommendations: TBD    PLAN  OT Treatment Plan: Energy conservation/work simplificat

## 2018-05-06 NOTE — PROGRESS NOTES
[facility] HOSPITALIST  Progress Note     Renee Show Patient Status:  Outpatient in a Bed    3/1/1945 MRN [id number]   [facility] 3SW-A Attending Heike Muniz MD   [facility] Day # 2 PCP No primary care provider on file.      Chief Complmilo Oral QAM AC       ASSESSMENT / PLAN:     1. LEft residual limb decubitus ulcer  1. Sp debridement  w/ tissue cx  2. CAD  3. COPD- stable  4. Remote h/o Lupus, not on meds  5. h/o VTE  6. PVD  7. Hypothyroid  1.  Continue levothyroxine     Plan of care:   Pt

## 2018-05-06 NOTE — PHYSICAL THERAPY NOTE
PHYSICAL THERAPY TREATMENT NOTE - INPATIENT    Room Number: 370/370-A     Session: 1   Number of Visits to Meet Established Goals: 4    Presenting Problem: decubitus ulcer of left residual limb s/p debridement (5/4/18)     History related to current admis RESTRICTION  Weight Bearing Restriction: None                PAIN ASSESSMENT   Ratin  Location: L LE  Management Techniques: Activity promotion; Body mechanics;Repositioning    BALANCE steps today toward head of bed, but able to scoot in seated toward head of bed MOD I. Returned to supine MOD I. Pt left with L LE stump elevated on pillows.       THERAPEUTIC EXERCISES  Lower Extremity Alternating marching  Ankle pumps  Glut sets  Hip AB/AD

## 2018-05-07 PROCEDURE — 99232 SBSQ HOSP IP/OBS MODERATE 35: CPT | Performed by: STUDENT IN AN ORGANIZED HEALTH CARE EDUCATION/TRAINING PROGRAM

## 2018-05-07 RX ORDER — HYDROCODONE BITARTRATE AND ACETAMINOPHEN 10; 325 MG/1; MG/1
1 TABLET ORAL EVERY 4 HOURS PRN
Qty: 60 TABLET | Refills: 0 | Status: SHIPPED | OUTPATIENT
Start: 2018-05-07 | End: 2018-05-09

## 2018-05-07 RX ORDER — SULFAMETHOXAZOLE AND TRIMETHOPRIM 800; 160 MG/1; MG/1
1 TABLET ORAL EVERY 12 HOURS SCHEDULED
Qty: 20 TABLET | Refills: 0 | Status: SHIPPED | OUTPATIENT
Start: 2018-05-07 | End: 2018-05-15 | Stop reason: ALTCHOICE

## 2018-05-07 RX ORDER — SULFAMETHOXAZOLE AND TRIMETHOPRIM 800; 160 MG/1; MG/1
1 TABLET ORAL EVERY 12 HOURS SCHEDULED
Status: DISCONTINUED | OUTPATIENT
Start: 2018-05-07 | End: 2018-05-08

## 2018-05-07 RX ORDER — SULFAMETHOXAZOLE AND TRIMETHOPRIM 800; 160 MG/1; MG/1
1 TABLET ORAL 2 TIMES DAILY
Qty: 20 TABLET | Refills: 0 | Status: SHIPPED | OUTPATIENT
Start: 2018-05-07 | End: 2018-05-07

## 2018-05-07 NOTE — PROGRESS NOTES
120 [facility] dosing service    Follow-up Pharmacokinetic Consult for Vancomycin Dosing     Justyn Naqvi is a 68year old female who is being treated for cellulitis. Patient is on day 3 of Vancomycin 1 gm IV Q 12 hours. Goal trough is 10-15 ug/mL. 87.2kg weight and renal function)    2. No further levels at this time as patient is at goal.  Goal trough level 10-15 ug/mL. 3.  Pharmacy will need BUN/Scr daily while on Vancomycin to assess renal function.     4.  Pharmacy will follow and monitor arslan

## 2018-05-07 NOTE — PROGRESS NOTES
[facility] HOSPITAL[facility]  Progress Note     Luis Angel Grande Patient Status:  Outpatient in a Bed    3/1/1945 MRN [id number]   [facility] [place] 3SW-A Attending Jyoti Lagos MD   [facility] Day # 3 PCP No primary care provider on file.      Chief Complai mirtazapine  7.5 mg Oral Nightly   • Pantoprazole Sodium  40 mg Oral QAM AC       ASSESSMENT / PLAN:     1. LEft residual limb decubitus ulcer  1. Sp debridement  w/ tissue cx  2. CAD  3. COPD- stable  4. Remote h/o Lupus, not on meds  5. h/o VTE  6.  PVD

## 2018-05-07 NOTE — CONSULTS
[facility]  Inpatient Wound Care Contact Note    Erik Patiño Patient Status:  Inpatient    3/1/1945 MRN [id number]   [facility] 3SW-A Attending Moni Carrizales MD   Hosp Day # 3 PCP No primary care provider on file.      Consult

## 2018-05-07 NOTE — CM/SW NOTE
Discussed pt during daily rounds. Per RN Caren Quintanilla pt likely to be cleared for discharge today. She will need a wound vac at facility. Her plan is to return to [address] [place] [address]. She is now MRSA positive. Spoke with Thelma Harris the liaison from [place].    Sharon Graham

## 2018-05-07 NOTE — CONSULTS
[facility]  Report of Inpatient Wound Care Consultation     Braulio Ianbraeden Patient Status:  Inpatient    3/1/1945 MRN [id number]   [facility] 3SW-A Attending Diane Rendon MD   Hosp Day # 3 PCP No primary care provider on file 0. 78   BUN  20   --    --    --   15   --    --    GLU  189*   --    --    --   89   --    --    CA  9.5   --    --    --   9.5   --    --    PGLU   --    < >  109*  111*   --   79   --     < > = values in this interval not displayed.        Imaging:   See [phone]  VM: [phone]

## 2018-05-07 NOTE — PLAN OF CARE
Per DENYS wound vac not available at [facility] until tomorrow- estimated time of delivery tomorrow TBD- call to Dr. Theo Fofana to discuss POC-  if discharge today appropriate with wound vac set up tomorrow.    Return call from Dr. Theo Fofana- per MD patient NOT to

## 2018-05-08 VITALS
BODY MASS INDEX: 30.89 KG/M2 | DIASTOLIC BLOOD PRESSURE: 69 MMHG | HEART RATE: 89 BPM | SYSTOLIC BLOOD PRESSURE: 111 MMHG | HEIGHT: 66 IN | WEIGHT: 192.19 LBS | OXYGEN SATURATION: 94 % | TEMPERATURE: 99 F | RESPIRATION RATE: 18 BRPM

## 2018-05-08 PROCEDURE — 99232 SBSQ HOSP IP/OBS MODERATE 35: CPT | Performed by: STUDENT IN AN ORGANIZED HEALTH CARE EDUCATION/TRAINING PROGRAM

## 2018-05-08 NOTE — PLAN OF CARE
PAIN - ADULT    • Verbalizes/displays adequate comfort level or patient's stated pain goal Adequate for Discharge        RISK FOR INFECTION - ADULT    • Absence of fever/infection during anticipated neutropenic period Adequate for Discharge        Left BKA

## 2018-05-08 NOTE — CM/SW NOTE
05/08/18 1100   Discharge disposition   Expected discharge disposition Skilled Nurs   Name of [address]   Patient is Discharged to a [address] [place] Yes   Discharge transportation [employer]

## 2018-05-08 NOTE — PHYSICAL THERAPY NOTE
Attempted to see patient at this time for PT. Per RN- Gen, patient is going to be transported to [place] for [facility] today at 3pm. Patient Jose Eduardo Roche declined PT due to plan to d/c to [facility] at 3pm. RN-Gen was notified.

## 2018-05-08 NOTE — PROGRESS NOTES
[facility]PITAL[facility]  Progress Note     Tariq Bianchi Patient Status:  Outpatient in a Bed    3/1/1945 MRN [id number]   [facility] 3SW-A Attending Gerald Kulkarni MD   Hosp Day # 4 PCP No primary care provider on file.      Chief Complai 1. LEft residual limb decubitus ulcer  1. Sp debridement  w/ tissue cx  2. CAD  3. COPD- stable  4. Remote h/o Lupus, not on meds  5. h/o VTE  6. PVD  7. Hypothyroid  1.  Continue levothyroxine     Plan of care:   Cleared for d.c     Quality:  · DVT Pro

## 2018-05-08 NOTE — CM/SW NOTE
Informed by RN that pt can d/c to [facility] today if facility has wound vac in place. Confirmed with Jovan Allen with [facility] [phone] that they do have wound vac for pt. Discussed 3PM d/c time. [facility] accepted transport for 3PM to [facility].     RN updated

## 2018-05-08 NOTE — PROGRESS NOTES
NURSING DISCHARGE NOTE    Discharged to [facility] via [facility] ambulance. Accompanied by paramedics. Belongings packed and sent home with patient. Called in report to [address] Agapito Melchor at [facility]. Abran Titus

## 2018-05-09 ENCOUNTER — SNF VISIT (OUTPATIENT)
Dept: INTERNAL MEDICINE CLINIC | Age: 73
End: 2018-05-09

## 2018-05-09 ENCOUNTER — NURSE ONLY (OUTPATIENT)
Dept: LAB | Age: 73
End: 2018-05-09
Attending: FAMILY MEDICINE
Payer: COMMERCIAL

## 2018-05-09 VITALS
OXYGEN SATURATION: 95 % | RESPIRATION RATE: 20 BRPM | HEART RATE: 92 BPM | SYSTOLIC BLOOD PRESSURE: 111 MMHG | DIASTOLIC BLOOD PRESSURE: 72 MMHG | TEMPERATURE: 99 F

## 2018-05-09 DIAGNOSIS — Z98.890 S/P DEBRIDEMENT: ICD-10-CM

## 2018-05-09 DIAGNOSIS — G62.9 NEUROPATHY: ICD-10-CM

## 2018-05-09 DIAGNOSIS — I73.9 PVD (PERIPHERAL VASCULAR DISEASE) (HCC): ICD-10-CM

## 2018-05-09 DIAGNOSIS — R11.0 NAUSEA: ICD-10-CM

## 2018-05-09 DIAGNOSIS — L93.0 LUPUS ERYTHEMATOSUS, UNSPECIFIED FORM: ICD-10-CM

## 2018-05-09 DIAGNOSIS — J42 CHRONIC BRONCHITIS, UNSPECIFIED CHRONIC BRONCHITIS TYPE (HCC): ICD-10-CM

## 2018-05-09 DIAGNOSIS — M15.3 OTHER SECONDARY OSTEOARTHRITIS OF MULTIPLE SITES: ICD-10-CM

## 2018-05-09 DIAGNOSIS — F17.200 SMOKER: ICD-10-CM

## 2018-05-09 DIAGNOSIS — Z96.659 H/O TOTAL KNEE REPLACEMENT: Primary | ICD-10-CM

## 2018-05-09 DIAGNOSIS — F41.8 DEPRESSION WITH ANXIETY: ICD-10-CM

## 2018-05-09 DIAGNOSIS — R53.1 WEAKNESS: ICD-10-CM

## 2018-05-09 DIAGNOSIS — Z89.512 S/P BKA (BELOW KNEE AMPUTATION) UNILATERAL, LEFT (HCC): Primary | ICD-10-CM

## 2018-05-09 PROCEDURE — 80053 COMPREHEN METABOLIC PANEL: CPT

## 2018-05-09 PROCEDURE — 85025 COMPLETE CBC W/AUTO DIFF WBC: CPT

## 2018-05-09 PROCEDURE — 99310 SBSQ NF CARE HIGH MDM 45: CPT | Performed by: NURSE PRACTITIONER

## 2018-05-09 PROCEDURE — 36415 COLL VENOUS BLD VENIPUNCTURE: CPT

## 2018-05-09 PROCEDURE — 83735 ASSAY OF MAGNESIUM: CPT

## 2018-05-09 PROCEDURE — 86140 C-REACTIVE PROTEIN: CPT

## 2018-05-09 PROCEDURE — 85652 RBC SED RATE AUTOMATED: CPT

## 2018-05-09 RX ORDER — ASCORBIC ACID 500 MG
500 TABLET ORAL 2 TIMES DAILY
COMMUNITY
Start: 2018-05-09 | End: 2018-05-24

## 2018-05-09 RX ORDER — CALCIUM CARBONATE/VITAMIN D3 600 MG-10
1 TABLET ORAL 2 TIMES DAILY
COMMUNITY

## 2018-05-09 RX ORDER — ERGOCALCIFEROL (VITAMIN D2) 1250 MCG
CAPSULE ORAL WEEKLY
COMMUNITY
Start: 2018-05-09 | End: 2018-06-13

## 2018-05-09 RX ORDER — ZINC SULFATE 50(220)MG
220 CAPSULE ORAL DAILY
COMMUNITY
Start: 2018-05-09 | End: 2018-05-24

## 2018-05-09 NOTE — PROGRESS NOTES
Karla Morales  : 3/1/1945  Age 68year old  female patient is admitted to Facility: [facility] [address] for [facility] s/p debridement of recent left BKA incision. [address] [place] [address] Drive date:    18 for left BKA  Discharge date to [facility]:    . CARDIOVASCULAR:denies chest pain, no palpitations , denies syncope, denies orthopnea, denies cough  GI: ---+nausea; denies diarrhea/constipation/GERD  :no dysuria, urgency or frequency; no vaginal discharge; no urinary incontinence; no hematuria  MUSCULO DIAGNOSTICS REVIEWED AT THIS VISIT:    Lab Results  Component Value Date   GLU 69 (L) 05/09/2018   BUN 13 05/09/2018   CREATSERUM 0.88 05/09/2018   GFR 55 (L) 03/03/2016   GFRNAA 66 05/09/2018   GFRAA 76 05/09/2018   CA 10.2 05/09/2018   ALKPHO 84 05/09/ 9. F/U w/ Dr Mary Lou Zabala surgery in 2 wks  10. Norco 10/325 mg; 2 tabs q 6 hrs prn; give dose stat  11. Weekly CBC, CMP, ESR, CRP  12. Promod 30cc BID    S/P left BKA/PVD/OA/Impaired functional mobilityLupus/  Neuropathy/PVD  1.  PT/OT to evaluate and t

## 2018-05-10 NOTE — DISCHARGE SUMMARY
Vascular Surgery  Surgical Discharge Summary    Admission Date: 5/4/2018   D/C Date: 5/8/2018  Discharge Diagnosis: decubitus of stump  Discharge Condition: good      HISTORY OF PRESENT ILLNESS: Brenda Villasenor is a 68year old  female who was omeprazole 20 MG Oral Capsule Delayed Release Take 40 mg by mouth every morning before breakfast. Disp:  Rfl:    Multiple Vitamin (MULTI-VITAMIN DAILY) Oral Tab Take 1 tablet by mouth daily.  Disp:  Rfl:    HYDROcodone-acetaminophen  MG Oral Tab Kwesi Coronado °C) (Oral)   Resp 18   Ht 5' 6\" (1.676 m)   Wt 192 lb 3 oz (87.2 kg)   SpO2 94%   BMI 31.02 kg/m²       left BKA    DISCHARGE INSTRUCTIONS: Discharge instructions were reviewed with the patient including follow up within 2 weeks

## 2018-05-11 ENCOUNTER — SNF VISIT (OUTPATIENT)
Dept: INTERNAL MEDICINE CLINIC | Age: 73
End: 2018-05-11

## 2018-05-11 VITALS
RESPIRATION RATE: 20 BRPM | OXYGEN SATURATION: 95 % | SYSTOLIC BLOOD PRESSURE: 135 MMHG | DIASTOLIC BLOOD PRESSURE: 72 MMHG | TEMPERATURE: 98 F | HEART RATE: 95 BPM

## 2018-05-11 DIAGNOSIS — R60.0 LOCALIZED EDEMA: ICD-10-CM

## 2018-05-11 DIAGNOSIS — G62.9 NEUROPATHY: ICD-10-CM

## 2018-05-11 DIAGNOSIS — Z89.512 S/P BKA (BELOW KNEE AMPUTATION) UNILATERAL, LEFT (HCC): Primary | ICD-10-CM

## 2018-05-11 DIAGNOSIS — Z98.890 S/P DEBRIDEMENT: ICD-10-CM

## 2018-05-11 PROCEDURE — 99308 SBSQ NF CARE LOW MDM 20: CPT | Performed by: NURSE PRACTITIONER

## 2018-05-11 NOTE — PROGRESS NOTES
Justyn Naqvi, 11/1945, 68year old, female    Chief Complaint:  Patient presents with:   Follow - Up: s/p debridement of left BKA incision  Edema: left thigh       Subjective:   PMH significant for Thyroid nodules, COPD, SHYAM, CAD, PVD, DVT, Lupus, PN cyanosis/necrosis  NEUROLOGIC: intact; no sensorimotor deficit, reflexes normal, cranial nerves intact II-XII, follows commands  PSYCHIATRIC: alert and oriented x 3; affect appropriate    Medications reviewed: Yes    Diagnostics reviewed:  No new results a mg q HS prn     Depression/Anxiety  1. Monitor mood  2. Cymbalta 30 mg daily  3. Mirtazapine 7.5 mg q HS  4.  Psych consult    DENISSE Dhillon  5/11/2018  12:00 PM

## 2018-05-14 ENCOUNTER — SNF VISIT (OUTPATIENT)
Dept: INTERNAL MEDICINE CLINIC | Age: 73
End: 2018-05-14

## 2018-05-14 VITALS
HEART RATE: 77 BPM | SYSTOLIC BLOOD PRESSURE: 105 MMHG | RESPIRATION RATE: 18 BRPM | DIASTOLIC BLOOD PRESSURE: 53 MMHG | OXYGEN SATURATION: 98 % | TEMPERATURE: 98 F

## 2018-05-14 DIAGNOSIS — Z98.890 S/P DEBRIDEMENT: ICD-10-CM

## 2018-05-14 DIAGNOSIS — Z89.512 S/P BKA (BELOW KNEE AMPUTATION) UNILATERAL, LEFT (HCC): Primary | ICD-10-CM

## 2018-05-14 PROCEDURE — 99309 SBSQ NF CARE MODERATE MDM 30: CPT | Performed by: NURSE PRACTITIONER

## 2018-05-14 NOTE — PROGRESS NOTES
Cris Boyle, 11/1945, 68year old, female    Chief Complaint:  Patient presents with:   Follow - Up: s/p debridement of left BKA incision  Wound       Subjective:   PMH significant for Thyroid nodules, COPD, SHYAM, CAD, PVD, DVT, Lupus, PN, and OA.  I visible cerumen.   NECK: supple; FROM; no JVD, no TMG, no carotid bruits  BREAST: ---normal skin  RESPIRATORY:---Clear but diminished bilaterally, no wheezing  CARDIOVASCULAR: S1, S2 normal, RRR; no S3, no S4; , no click, no murmur  ABDOMEN:  normal active Precautions  6. Norco 10/325 mg as above  7. Tramadol 50 mg q6h prn  8. Gabapentin 400 mg TID  9. Tylenol 500 mg; 1-2 tabs  q 6 prn   10. ELOS:  12-14 days days  11. Anticipated Discharge:  5/22/18     COPD/Smoker/SHYAM  1.  Vitals q shift  2. RT following  3

## 2018-05-15 RX ORDER — DOCUSATE SODIUM 100 MG/1
100 CAPSULE, LIQUID FILLED ORAL 2 TIMES DAILY
COMMUNITY
End: 2018-06-20 | Stop reason: ALTCHOICE

## 2018-05-15 RX ORDER — FUROSEMIDE 20 MG/1
20 TABLET ORAL DAILY
COMMUNITY
End: 2018-06-20

## 2018-05-15 NOTE — PAT NURSING NOTE
RN spoke with Ashleigh Isaacs, the nurse at [facility] [place]. Health history information obtained, surgical instructions given, and questions answered. This Rn will await MAR from [facility], and will also get in touch with pt.

## 2018-05-16 ENCOUNTER — ANESTHESIA EVENT (OUTPATIENT)
Dept: SURGERY | Facility: HOSPITAL | Age: 73
End: 2018-05-16

## 2018-05-16 ENCOUNTER — SNF VISIT (OUTPATIENT)
Dept: INTERNAL MEDICINE CLINIC | Age: 73
End: 2018-05-16

## 2018-05-16 ENCOUNTER — NURSE ONLY (OUTPATIENT)
Dept: LAB | Age: 73
End: 2018-05-16
Attending: FAMILY MEDICINE
Payer: MEDICARE

## 2018-05-16 VITALS
TEMPERATURE: 98 F | HEART RATE: 70 BPM | OXYGEN SATURATION: 98 % | RESPIRATION RATE: 18 BRPM | DIASTOLIC BLOOD PRESSURE: 75 MMHG | SYSTOLIC BLOOD PRESSURE: 116 MMHG

## 2018-05-16 DIAGNOSIS — Z98.890 S/P DEBRIDEMENT: ICD-10-CM

## 2018-05-16 DIAGNOSIS — I73.9 PERIPHERAL VASCULAR DISEASE, UNSPECIFIED (HCC): Primary | ICD-10-CM

## 2018-05-16 DIAGNOSIS — M62.81 MUSCLE WEAKNESS (GENERALIZED): ICD-10-CM

## 2018-05-16 DIAGNOSIS — Z89.512 S/P BKA (BELOW KNEE AMPUTATION) UNILATERAL, LEFT (HCC): Primary | ICD-10-CM

## 2018-05-16 DIAGNOSIS — F41.8 DEPRESSION WITH ANXIETY: ICD-10-CM

## 2018-05-16 DIAGNOSIS — D50.9 IRON DEFICIENCY ANEMIA, UNSPECIFIED IRON DEFICIENCY ANEMIA TYPE: ICD-10-CM

## 2018-05-16 PROCEDURE — 99309 SBSQ NF CARE MODERATE MDM 30: CPT | Performed by: NURSE PRACTITIONER

## 2018-05-16 PROCEDURE — 85652 RBC SED RATE AUTOMATED: CPT

## 2018-05-16 PROCEDURE — 86140 C-REACTIVE PROTEIN: CPT

## 2018-05-16 PROCEDURE — 80053 COMPREHEN METABOLIC PANEL: CPT

## 2018-05-16 PROCEDURE — 36415 COLL VENOUS BLD VENIPUNCTURE: CPT

## 2018-05-16 PROCEDURE — 85025 COMPLETE CBC W/AUTO DIFF WBC: CPT

## 2018-05-16 RX ORDER — ALPRAZOLAM 0.5 MG/1
0.5 TABLET ORAL 2 TIMES DAILY PRN
Status: ON HOLD | COMMUNITY
End: 2018-05-17

## 2018-05-16 RX ORDER — DULOXETIN HYDROCHLORIDE 60 MG/1
60 CAPSULE, DELAYED RELEASE ORAL DAILY
Status: ON HOLD | COMMUNITY
End: 2018-05-17

## 2018-05-16 RX ORDER — MELATONIN
325 2 TIMES DAILY WITH MEALS
COMMUNITY

## 2018-05-16 NOTE — PROGRESS NOTES
Jay Oliva, 11/1945, 68year old, female    Chief Complaint:  Patient presents with:   Follow - Up: s/p debridement of left BKA incision  Depression       Subjective:   PMH significant for Thyroid nodules, COPD, SHYAM, CAD, PVD, DVT, Lupus, PN, and O ABDOMEN:  normal active BS+, soft, nondistended; no organomegaly, no masses; no bruits; nontender, no guarding, no rebound tenderness.    :Deferred  LYMPHATIC:no lymphedema  MUSCULOSKELETAL: no acute synovitis upper or lower extremity  EXTREMITIES/VASCULA Assessment and plan:  Remains afebrile, no foul odor. Medically stable for planned surgery on 5.17.18. S/P Debridement of lateral left BKA incision/MRSA positive in wound  1. Vancomycin 1 gm IV q 12 hrs  2. Vanco trough5.17.18  3.  Consult wound RN to maría elena

## 2018-05-17 ENCOUNTER — ANESTHESIA (OUTPATIENT)
Dept: SURGERY | Facility: HOSPITAL | Age: 73
End: 2018-05-17

## 2018-05-17 ENCOUNTER — HOSPITAL ENCOUNTER (INPATIENT)
Facility: HOSPITAL | Age: 73
LOS: 4 days | Discharge: NURSING, REHAB OR LONG-TERM CARE | DRG: 502 | End: 2018-05-23
Attending: SURGERY | Admitting: SURGERY
Payer: MEDICARE

## 2018-05-17 ENCOUNTER — NURSE ONLY (OUTPATIENT)
Dept: LAB | Age: 73
DRG: 502 | End: 2018-05-17
Attending: FAMILY MEDICINE
Payer: MEDICARE

## 2018-05-17 ENCOUNTER — SURGERY (OUTPATIENT)
Age: 73
End: 2018-05-17

## 2018-05-17 DIAGNOSIS — T87.40 AMPUTATION STUMP INFECTION (HCC): Primary | ICD-10-CM

## 2018-05-17 DIAGNOSIS — T14.8XXA WOUND INFECTION: Primary | ICD-10-CM

## 2018-05-17 DIAGNOSIS — L08.9 WOUND INFECTION: Primary | ICD-10-CM

## 2018-05-17 PROCEDURE — 0KBT0ZZ EXCISION OF LEFT LOWER LEG MUSCLE, OPEN APPROACH: ICD-10-PCS | Performed by: SURGERY

## 2018-05-17 PROCEDURE — 36415 COLL VENOUS BLD VENIPUNCTURE: CPT

## 2018-05-17 PROCEDURE — 80202 ASSAY OF VANCOMYCIN: CPT

## 2018-05-17 PROCEDURE — 99223 1ST HOSP IP/OBS HIGH 75: CPT | Performed by: HOSPITALIST

## 2018-05-17 RX ORDER — DIPHENHYDRAMINE HYDROCHLORIDE 50 MG/ML
12.5 INJECTION INTRAMUSCULAR; INTRAVENOUS AS NEEDED
Status: DISCONTINUED | OUTPATIENT
Start: 2018-05-17 | End: 2018-05-17 | Stop reason: HOSPADM

## 2018-05-17 RX ORDER — OMEPRAZOLE 20 MG/1
40 CAPSULE, DELAYED RELEASE ORAL
COMMUNITY
End: 2018-06-20 | Stop reason: ALTCHOICE

## 2018-05-17 RX ORDER — DULOXETIN HYDROCHLORIDE 30 MG/1
30 CAPSULE, DELAYED RELEASE ORAL DAILY
Status: DISCONTINUED | OUTPATIENT
Start: 2018-05-17 | End: 2018-05-23

## 2018-05-17 RX ORDER — ACETAMINOPHEN 325 MG/1
650 TABLET ORAL EVERY 4 HOURS PRN
COMMUNITY
End: 2018-08-27 | Stop reason: ALTCHOICE

## 2018-05-17 RX ORDER — CLINDAMYCIN PHOSPHATE 900 MG/50ML
900 INJECTION INTRAVENOUS ONCE
Status: COMPLETED | OUTPATIENT
Start: 2018-05-17 | End: 2018-05-17

## 2018-05-17 RX ORDER — NALOXONE HYDROCHLORIDE 0.4 MG/ML
80 INJECTION, SOLUTION INTRAMUSCULAR; INTRAVENOUS; SUBCUTANEOUS AS NEEDED
Status: DISCONTINUED | OUTPATIENT
Start: 2018-05-17 | End: 2018-05-17 | Stop reason: HOSPADM

## 2018-05-17 RX ORDER — ENOXAPARIN SODIUM 100 MG/ML
40 INJECTION SUBCUTANEOUS DAILY
Status: DISCONTINUED | OUTPATIENT
Start: 2018-05-18 | End: 2018-05-23

## 2018-05-17 RX ORDER — HYDROCODONE BITARTRATE AND ACETAMINOPHEN 5; 325 MG/1; MG/1
1 TABLET ORAL AS NEEDED
Status: DISCONTINUED | OUTPATIENT
Start: 2018-05-17 | End: 2018-05-17 | Stop reason: HOSPADM

## 2018-05-17 RX ORDER — MIRTAZAPINE 15 MG/1
7.5 TABLET, FILM COATED ORAL NIGHTLY
Status: DISCONTINUED | OUTPATIENT
Start: 2018-05-17 | End: 2018-05-23

## 2018-05-17 RX ORDER — SODIUM CHLORIDE, SODIUM LACTATE, POTASSIUM CHLORIDE, CALCIUM CHLORIDE 600; 310; 30; 20 MG/100ML; MG/100ML; MG/100ML; MG/100ML
INJECTION, SOLUTION INTRAVENOUS CONTINUOUS
Status: DISCONTINUED | OUTPATIENT
Start: 2018-05-17 | End: 2018-05-17 | Stop reason: HOSPADM

## 2018-05-17 RX ORDER — MIDAZOLAM HYDROCHLORIDE 1 MG/ML
1 INJECTION INTRAMUSCULAR; INTRAVENOUS EVERY 5 MIN PRN
Status: DISCONTINUED | OUTPATIENT
Start: 2018-05-17 | End: 2018-05-17 | Stop reason: HOSPADM

## 2018-05-17 RX ORDER — DOCUSATE SODIUM 100 MG/1
100 CAPSULE, LIQUID FILLED ORAL 2 TIMES DAILY
Status: DISCONTINUED | OUTPATIENT
Start: 2018-05-17 | End: 2018-05-23

## 2018-05-17 RX ORDER — DULOXETIN HYDROCHLORIDE 30 MG/1
60 CAPSULE, DELAYED RELEASE ORAL DAILY
COMMUNITY
End: 2018-06-20 | Stop reason: ALTCHOICE

## 2018-05-17 RX ORDER — PANTOPRAZOLE SODIUM 40 MG/1
40 TABLET, DELAYED RELEASE ORAL
Status: DISCONTINUED | OUTPATIENT
Start: 2018-05-18 | End: 2018-05-23

## 2018-05-17 RX ORDER — HYDROMORPHONE HYDROCHLORIDE 2 MG/1
2 TABLET ORAL EVERY 2 HOUR PRN
Status: DISCONTINUED | OUTPATIENT
Start: 2018-05-17 | End: 2018-05-23

## 2018-05-17 RX ORDER — HYDROCODONE BITARTRATE AND ACETAMINOPHEN 5; 325 MG/1; MG/1
2 TABLET ORAL AS NEEDED
Status: DISCONTINUED | OUTPATIENT
Start: 2018-05-17 | End: 2018-05-17 | Stop reason: HOSPADM

## 2018-05-17 RX ORDER — ZINC SULFATE 50(220)MG
220 CAPSULE ORAL DAILY
Status: DISCONTINUED | OUTPATIENT
Start: 2018-05-17 | End: 2018-05-23

## 2018-05-17 RX ORDER — SODIUM CHLORIDE, SODIUM LACTATE, POTASSIUM CHLORIDE, CALCIUM CHLORIDE 600; 310; 30; 20 MG/100ML; MG/100ML; MG/100ML; MG/100ML
INJECTION, SOLUTION INTRAVENOUS CONTINUOUS
Status: DISCONTINUED | OUTPATIENT
Start: 2018-05-17 | End: 2018-05-17

## 2018-05-17 RX ORDER — HYDROMORPHONE HYDROCHLORIDE 1 MG/ML
0.5 INJECTION, SOLUTION INTRAMUSCULAR; INTRAVENOUS; SUBCUTANEOUS
Status: DISCONTINUED | OUTPATIENT
Start: 2018-05-17 | End: 2018-05-23

## 2018-05-17 RX ORDER — ACETAMINOPHEN 500 MG
1000 TABLET ORAL ONCE
Status: COMPLETED | OUTPATIENT
Start: 2018-05-17 | End: 2018-05-17

## 2018-05-17 RX ORDER — GABAPENTIN 400 MG/1
400 CAPSULE ORAL 3 TIMES DAILY
Status: DISCONTINUED | OUTPATIENT
Start: 2018-05-17 | End: 2018-05-23

## 2018-05-17 RX ORDER — TRAMADOL HYDROCHLORIDE 50 MG/1
50 TABLET ORAL EVERY 6 HOURS PRN
Status: DISCONTINUED | OUTPATIENT
Start: 2018-05-17 | End: 2018-05-23

## 2018-05-17 RX ORDER — FUROSEMIDE 20 MG/1
20 TABLET ORAL DAILY
Status: DISCONTINUED | OUTPATIENT
Start: 2018-05-17 | End: 2018-05-23

## 2018-05-17 RX ORDER — MEPERIDINE HYDROCHLORIDE 50 MG/ML
12.5 INJECTION INTRAMUSCULAR; INTRAVENOUS; SUBCUTANEOUS AS NEEDED
Status: DISCONTINUED | OUTPATIENT
Start: 2018-05-17 | End: 2018-05-17 | Stop reason: HOSPADM

## 2018-05-17 RX ORDER — HYDROMORPHONE HYDROCHLORIDE 1 MG/ML
0.4 INJECTION, SOLUTION INTRAMUSCULAR; INTRAVENOUS; SUBCUTANEOUS EVERY 5 MIN PRN
Status: DISCONTINUED | OUTPATIENT
Start: 2018-05-17 | End: 2018-05-17 | Stop reason: HOSPADM

## 2018-05-17 RX ORDER — HYDROCODONE BITARTRATE AND ACETAMINOPHEN 10; 325 MG/1; MG/1
2 TABLET ORAL EVERY 6 HOURS PRN
Status: DISCONTINUED | OUTPATIENT
Start: 2018-05-17 | End: 2018-05-23

## 2018-05-17 RX ORDER — ONDANSETRON 2 MG/ML
4 INJECTION INTRAMUSCULAR; INTRAVENOUS AS NEEDED
Status: DISCONTINUED | OUTPATIENT
Start: 2018-05-17 | End: 2018-05-17 | Stop reason: HOSPADM

## 2018-05-17 RX ORDER — LABETALOL HYDROCHLORIDE 5 MG/ML
5 INJECTION, SOLUTION INTRAVENOUS EVERY 5 MIN PRN
Status: DISCONTINUED | OUTPATIENT
Start: 2018-05-17 | End: 2018-05-17 | Stop reason: HOSPADM

## 2018-05-17 NOTE — BRIEF OP NOTE
Pre-Operative Diagnosis: left below knee amputation wound dehiscence     Post-Operative Diagnosis: same     Procedure Performed:   1.  Debridement of left below knee amputation skin, muscle and fascia    Surgeon(s) and Role:     Елена Morrow MD - Mert Simpson

## 2018-05-17 NOTE — CONSULTS
[facility]  [address] [facility] Patient Status:  Outpatient in a Bed    3/1/1945 MRN [id number]   [facility] 3SW-A Attending Ira Lee MD   Hosp Day # 0 PCP No primary care provider on file.      Reason for [facility] MG Oral Tab Take 500 mg by mouth 2 (two) times daily. Disp:  Rfl:    zinc sulfate 220 (50 Zn) MG Oral Cap Take 220 mg by mouth daily. Disp:  Rfl:    Calcium Carbonate-Vitamin D 600-400 MG-UNIT Oral Tab Take 1 tablet by mouth 2 (two) times daily.  Disp:  Rfl gallops. Equal pulses. Chest and Back: No tenderness or deformity. Abdomen: Soft, nontender, nondistended. Positive bowel sounds. No rebound or guarding. Musculoskeletal: Moves all extremities.   Extremities: No edema s/p debridement with dressing plac

## 2018-05-17 NOTE — H&P
[facility]  Vascular Surgery Consultation    Kleber Santo Patient Status:  Outpatient in a Bed    3/1/1945 MRN [id number]   Location [address] [place] [address] Attending Bianca Alexander MD   Hosp Day # 0 PCP No primary ca CONSTITUTIONAL: denies fever, chills  ENT: denies sore throat, nasal drainage/congestion  CHEST/CVS: denies cough, sputum, trouble breathing/SOB, chest pain, NAVARRO  GI: denies abdominal pain, heartburn, diarrhea, blood in bm, tarry bm, constipation,    :

## 2018-05-17 NOTE — ANESTHESIA POSTPROCEDURE EVALUATION
[address] [place] [address] Patient Status:  Outpatient in a Bed   Age/Gender 68year old female MRN [id number]   [facility] SURGERY Attending Angela Campos MD   Hosp Day # 0 PCP No primary care provider on file.        Anesthesia

## 2018-05-17 NOTE — ANESTHESIA PREPROCEDURE EVALUATION
PRE-OP EVALUATION    Patient Name: Erik Patiño    Pre-op Diagnosis: AMPUTATION STUMP INFECTION    Procedure(s):  LEFT LOWER EXTREMITY STUMP DEBRIDEMENT     Surgeon(s) and Role:     Bailee Mcintosh MD - Primary    Pre-op vitals reviewed.   Temp: 98 within the normal range. There was no  evidence for stenosis. There was trivial regurgitation. Aortic valve:   Structurally normal valve. Trileaflet. Cusp separation was  normal.  Doppler:  Transvalvular velocity was within the normal range.  There  was no HGB 8.2 (L) 05/16/2018   HCT 27.5 (L) 05/16/2018   .0 (H) 05/16/2018   MCH 30.7 05/16/2018   MCHC 29.8 (L) 05/16/2018   RDW 14.2 05/16/2018   .0 05/16/2018       Lab Results  Component Value Date    05/16/2018   K 4.0 05/16/2018   CL

## 2018-05-17 NOTE — CONSULTS
[facility] dosing service    Initial Pharmacokinetic Consult for Vancomycin Dosing     Braulio Cox is a 68year old female admitted on 5/17/18 who is being treated for non-healing ulcer,  debridement of left BKA .   [facility] [place]  5/17/2018  2:36 PM  [address] [facility] Extension: [phone]

## 2018-05-18 PROCEDURE — 99232 SBSQ HOSP IP/OBS MODERATE 35: CPT | Performed by: HOSPITALIST

## 2018-05-18 NOTE — PLAN OF CARE
PAIN - ADULT    • Verbalizes/displays adequate comfort level or patient's stated pain goal Progressing        Patient/Family Goals    • Patient/Family Long Term Goal Progressing        SKIN/TISSUE INTEGRITY - ADULT    • Incision(s), wounds(s) or drain site

## 2018-05-18 NOTE — PROGRESS NOTES
Discussed findings of debridement  I suspect she will need above knee amputation but willing to see if stump can heel with vac and debridement. I told patient odds are against but she is hopeful.    Will have vac placed today

## 2018-05-18 NOTE — PROGRESS NOTES
[facility]  Progress Note     Ny Marin Patient Status:  Outpatient in a Bed    3/1/1945 MRN [id number]   [facility] 3SW-A Attending Faina Mac MD   Hosp Day # 0 PCP No primary care provider on file.      Chief Compl Subcutaneous Daily     ASSESSMENT / PLAN:   1. s/p debridement with PVD. h/o MRSA  1. Per Dr. Madhav Guajardo  2. Cont Vanc   2. CAD- no acute issue  3. COPD- controlled  4. GERD- PPI  5. Depression- SSRI.  Uncontrolled and feeling of hopelessness as patient may nee

## 2018-05-18 NOTE — BH PROGRESS NOTE
Level of Care Assessment Note    General Questions  Why are you here?: Patient states she came into the hospital due to having problems with the leg that was amputated. She said, it is infected and she doesnt know why.   Precipitating Events: Patient repor suicide or harm others or property: No  Access to [employer]: No  Do you have a firearm owner ID card?: No    Self Injury  History of Self Injurious Behaviors: No  Present Self-Injurious Behaviors: No    Mental Health Symptoms  Hallucination Type: No p No  Do you have any prior/current legal concerns?: None  History of Gang Involvement: No  Type of Residence: [facility] [place] [facility]    Abuse Assessment  Physical Abuse: Denies  Verbal Abuse: Denies  Sexual Abuse: Denies  Ne any suicidal thoughts. She does not want to f/u with a therapist or psychiatrist.  States received this information before when she was seen by trudi last month.       Risk/Protective Factors  Risk Factors: Stressful life events or loss  Protective Factors:

## 2018-05-19 PROCEDURE — 99232 SBSQ HOSP IP/OBS MODERATE 35: CPT | Performed by: HOSPITALIST

## 2018-05-19 NOTE — PLAN OF CARE
Pt resting in bed, watching tv. Calm and making pleasant conversation. Pt rates pain to left stump as \"5\" and declines pain medication need at present. Left stump ace intact and dry, elevated on pillows.   Reminded on call light use, pain medication on

## 2018-05-19 NOTE — PROGRESS NOTES
[facility]  Progress Note     Allen Slot Patient Status:  Outpatient in a Bed    3/1/1945 MRN [id number]   [facility] 3SW-A Attending Rudi Santo MD   Hosp Day # 0 PCP No primary care provider on file.      Chief Compl Q12H   • enoxaparin  40 mg Subcutaneous Daily     ASSESSMENT / PLAN:   1. s/p debridement with PVD. h/o MRSA  1. Per Dr. Luci Oliver  2. Cont Vanc   2. CAD- no acute issue  3. COPD- controlled  4. GERD- PPI  5. Depression- SSRI.  Uncontrolled and feeling of hope

## 2018-05-19 NOTE — PROGRESS NOTES
[facility] dosing service    Follow-up Pharmacokinetic Consult for Vancomycin Dosing     Thierry Monica is a 68year old female who is being treated for non-healing ulcer . Patient is on day 3 of Vancomycin 1.5 gm IV Q 12 hours.   Goal trough is 15-

## 2018-05-19 NOTE — PHYSICAL THERAPY NOTE
Attempted to see patient; She had just gotten breakfast and asked for me to return. Will attempt if schedule permits.

## 2018-05-19 NOTE — PROGRESS NOTES
Unable to draw blood from PICC line. Note red port flushes well but unable to draw blood and Unable to flush purple port. Lab called to draw Vanco trough.

## 2018-05-20 ENCOUNTER — APPOINTMENT (OUTPATIENT)
Dept: GENERAL RADIOLOGY | Facility: HOSPITAL | Age: 73
DRG: 502 | End: 2018-05-20
Attending: HOSPITALIST
Payer: MEDICARE

## 2018-05-20 PROCEDURE — 99232 SBSQ HOSP IP/OBS MODERATE 35: CPT | Performed by: HOSPITALIST

## 2018-05-20 PROCEDURE — 71045 X-RAY EXAM CHEST 1 VIEW: CPT | Performed by: HOSPITALIST

## 2018-05-20 NOTE — PLAN OF CARE
Changed dressing to L BKA. Wet to dry, with gauze, ABD, Kerlix and ace wrap, premedicated for pain control, pt tolerated well.   Pt was pleasant, expressed signs of acceptance, discussed her move to a handicapped accessible apartment in her same building,

## 2018-05-20 NOTE — PROGRESS NOTES
[facility]  Progress Note     Braulio Cox Patient Status:  Outpatient in a Bed    3/1/1945 MRN [id number]   [facility] 3SW-A Attending Diane Rendon MD   [facility] Day # 1 PCP No primary care provider on file.      Chief Compl • DULoxetine HCl  30 mg Oral Daily   • furosemide  20 mg Oral Daily   • Pantoprazole Sodium  40 mg Oral QAM AC   • zinc sulfate  220 mg Oral Daily   • gabapentin  400 mg Oral TID   • mirtazapine  7.5 mg Oral Nightly   • enoxaparin  40 mg Subcutaneous Shanta Sinclair

## 2018-05-20 NOTE — PHYSICAL THERAPY NOTE
Attempted to see patient for PT evaluation this time. Daughter Gabriele Mooney was present. Patient declined to participate with mobility assessment this time.  Stated she will participate with PT once she returns to [address] [place] [address] Drive was notified,  Will re

## 2018-05-20 NOTE — OPERATIVE REPORT
Pre-Operative Diagnosis: left below knee amputation wound dehiscence     Post-Operative Diagnosis: same     Procedure Performed:   1.  Debridement of left below knee amputation skin, muscle and fascia    Surgeon(s) and Role:     Deborah Yates MD - Robb Zaidi with pulse lavage antibiotic irrigation. Then secured by 2-0 Vicryl sutures to the fascia. I then packed the wound with dry and will plan for wound VAC to attempt to get the wound pulled together.   Patient was awakened from anesthesia in stable condition

## 2018-05-20 NOTE — PROGRESS NOTES
Left lower extremety stump dressing changed per orders. Patient medicated for pain prior however had increased pain with dressing removal.  Edges red/ and clean with small amount serosanguinous drainage noted. No odor. New dressing applied per orders.

## 2018-05-21 ENCOUNTER — NURSE ONLY (OUTPATIENT)
Dept: LAB | Age: 73
End: 2018-05-21
Attending: FAMILY MEDICINE
Payer: MEDICARE

## 2018-05-21 DIAGNOSIS — Z79.2 LONG TERM (CURRENT) USE OF ANTIBIOTICS: Primary | ICD-10-CM

## 2018-05-21 PROCEDURE — 99232 SBSQ HOSP IP/OBS MODERATE 35: CPT | Performed by: HOSPITALIST

## 2018-05-21 RX ORDER — KETOROLAC TROMETHAMINE 30 MG/ML
30 INJECTION, SOLUTION INTRAMUSCULAR; INTRAVENOUS EVERY 6 HOURS PRN
Status: DISCONTINUED | OUTPATIENT
Start: 2018-05-21 | End: 2018-05-22

## 2018-05-21 NOTE — WOUND PROGRESS NOTE
[facility]  Inpatient Wound Care Contact Note    Malva Patches Patient Status:  Inpatient    3/1/1945 MRN [id number]   [facility] 3SW-A Attending Angela Campos MD   Hosp Day # 2 PCP No primary care provider on file.      Mooes Haskins

## 2018-05-21 NOTE — PHYSICAL THERAPY NOTE
PHYSICAL THERAPY EVALUATION - INPATIENT     Room Number: 370/370-A  Evaluation Date: 5/21/2018  Type of Evaluation: Initial  Physician Order: PT Eval and Treat    Presenting Problem: S/p Debridment of Left BKA skin,muscle & fascia on 05/17/18  Reason on sitting & standing balance with PT & was able to transfer in & out of w/c with assist of PT. Needed assist with some of ADLs & self care. SUBJECTIVE  \"I am looking forward to go back to Rehab,\" Patient was pleasant this time. No family present.     Pa side of the bed?: A Little   How much help from another person does the patient currently need. ..   -   Moving to and from a bed to a chair (including a wheelchair)?: Total   -   Need to walk in hospital room?: Total   -   Climbing 3-5 steps with a railing Left BKA skin,muscle & fascia on 05/17/18. Pertinent comorbidities and personal factors impacting therapy include COPD,CAD,h/o DVT,Lupus,PVD.   In this PT evaluation, the patient presents with the following impairments Decreased strength in left hip & knee

## 2018-05-21 NOTE — PROGRESS NOTES
[facility]  Progress Note     Osmni Topete Patient Status:  Outpatient in a Bed    3/1/1945 MRN [id number]   [facility] 3SW-A Attending Rossy Resendiz MD   [facility] Day # 2 PCP No primary care provider on file.      Chief Compl hours. No results for input(s): TROP, CK in the last 168 hours. Imaging: Imaging data reviewed in Epic.   Medications:   • vancomycin  15 mg/kg (Adjusted) Intravenous Q12H   • docusate sodium  100 mg Oral BID   • DULoxetine HCl  30 mg Oral Daily   • fu

## 2018-05-21 NOTE — PROGRESS NOTES
[facility] dosing service    Follow-up Pharmacokinetic Consult for Vancomycin Dosing     Jay Oliva is a 68year old female who is being treated for non-healing ulcer . Patient is on day 4 of Vancomycin 1 gm IVPB Q 12 hours.   Goal trough is 15- Extension: [phone]

## 2018-05-21 NOTE — CM/SW NOTE
05/21/18 1100   CM/SW Referral Data   Referral Source Physician   Reason for Referral Discharge planning   Informant Patient;[facility]   Readmission Assessment   Factors that patient feels contributed to this readmission Other (only choose if nothing

## 2018-05-21 NOTE — PLAN OF CARE
PAIN - ADULT    • Verbalizes/displays adequate comfort level or patient's stated pain goal Progressing          SKIN/TISSUE INTEGRITY - ADULT    • Incision(s), wounds(s) or drain site(s) healing without S/S of infection Progressing        Assumed care of p

## 2018-05-21 NOTE — WOUND PROGRESS NOTE
[facility]  Report of Inpatient Wound Care Progress Note    Syl Linares Patient Status:  Inpatient    3/1/1945 MRN [id number]   [facility] 3SW-A Attending Aurelio Casarez MD   Hosp Day # 2 PCP No primary care provider on file. --    --    --    CRP  2.59*   --    --    --    --     < > = values in this interval not displayed. Follow up Imaging and Microbiology results:   See EMR    ASSESSMENT/ RECOMMENDATIONS:  Patient seen bedside, pre-medicated prior to Rx.     Cleaned th

## 2018-05-22 PROCEDURE — 99232 SBSQ HOSP IP/OBS MODERATE 35: CPT | Performed by: INTERNAL MEDICINE

## 2018-05-22 RX ORDER — KETOROLAC TROMETHAMINE 15 MG/ML
15 INJECTION, SOLUTION INTRAMUSCULAR; INTRAVENOUS EVERY 6 HOURS PRN
Status: ACTIVE | OUTPATIENT
Start: 2018-05-22 | End: 2018-05-23

## 2018-05-22 NOTE — PHYSICAL THERAPY NOTE
PHYSICAL THERAPY TREATMENT NOTE - INPATIENT    Room Number: 370/370-A     Session: 1   Number of Visits to Meet Established Goals: 5    Presenting Problem: S/p Debridment of Left BKA skin,muscle & fascia on 05/17/18    History related to current admission mechanics;Breathing techniques;Relaxation;Repositioning    BALANCE                                                                                                                     Static Sitting: Fair +  Dynamic Sitting: Fair           Static Standing: and to prevent it from keeping in all day in dangling position.      THERAPEUTIC EXERCISES  Lower Extremity Ankle pumps  Glut sets  Hip AB/AD  Heel slides  SLR  on RLE   LLE SLR, hip abd and quad set       Upper Extremity      Position Supine     Repetition

## 2018-05-22 NOTE — PROGRESS NOTES
[facility]  Progress Note     Thierry Monica Patient Status:  Outpatient in a Bed    3/1/1945 MRN [id number]   [facility] 3SW-A Attending Ed Alvarado MD   [facility] Day # 3 PCP No primary care provider on file.      Chief Compl --    --    BILT  0.2   --    --    --    --    TP  6.5   --    --    --    --     < > = values in this interval not displayed. Imaging:   X-ray CHEST AP PORTABLE  (CPT=71045)     TECHNIQUE:  AP chest radiograph was obtained.      COMPARISON:  None prosthesis at some point. 1. Psych liaison appreciated  6.  Anemia- stable       Quality:  · DVT Prophylaxis: lovenox  · CODE status: Full  Estimated date of discharge: once cleared by Dr. Elisha Chase MD  5/22/2018

## 2018-05-22 NOTE — PROGRESS NOTES
[facility] Pharmacy Note:  Age Based Dose Adjustment    Malva Patches has been prescribed ketorolac (TORADOL) 30 mg IV every 6 hours as needed. Patient is >71 years old therefore the dose has been adjusted to 15 mg IV every 6 hours as needed.       Thank you

## 2018-05-23 ENCOUNTER — NURSE ONLY (OUTPATIENT)
Dept: LAB | Age: 73
End: 2018-05-23
Attending: FAMILY MEDICINE
Payer: MEDICARE

## 2018-05-23 VITALS
OXYGEN SATURATION: 96 % | SYSTOLIC BLOOD PRESSURE: 102 MMHG | HEART RATE: 94 BPM | RESPIRATION RATE: 17 BRPM | TEMPERATURE: 98 F | BODY MASS INDEX: 32.14 KG/M2 | WEIGHT: 200 LBS | DIASTOLIC BLOOD PRESSURE: 62 MMHG | HEIGHT: 66 IN

## 2018-05-23 DIAGNOSIS — I73.9 PERIPHERAL VASCULAR DISEASE, UNSPECIFIED (HCC): Primary | ICD-10-CM

## 2018-05-23 DIAGNOSIS — R53.1 WEAKNESS: ICD-10-CM

## 2018-05-23 PROCEDURE — 99232 SBSQ HOSP IP/OBS MODERATE 35: CPT | Performed by: INTERNAL MEDICINE

## 2018-05-23 RX ORDER — HYDROCODONE BITARTRATE AND ACETAMINOPHEN 10; 325 MG/1; MG/1
2 TABLET ORAL EVERY 6 HOURS PRN
Qty: 60 TABLET | Refills: 0 | Status: SHIPPED | OUTPATIENT
Start: 2018-05-23 | End: 2018-06-20

## 2018-05-23 NOTE — PLAN OF CARE
NURSING DISCHARGE NOTE    Discharged Rehab facility via Ambulance. Accompanied by Support staff  Belongings Taken by patient/family. Report called to [facility]. All paperwork including script for norco and vancomycin taken by ambulance.  Per DENYS; Judith Faria

## 2018-05-23 NOTE — PHYSICAL THERAPY NOTE
PHYSICAL THERAPY TREATMENT NOTE - INPATIENT    Room Number: 370/370-A     Session: 2  Number of Visits to Meet Established Goals: 5    Presenting Problem: S/p Debridment of Left BKA skin,muscle & fascia on 05/17/18  History related to current admission: P Ratin  Location: Left BKA stump & incisional pain  Management Techniques: Activity promotion; Body mechanics;Breathing techniques;Relaxation;Repositioning    BALANCE SBA for safety. Patient sat @ edge of bed with Fair+ balance for 10 minutes to self challenge balance frequently. Returned to bed in supine & scooted up in bed with CGA for left LE. Patient was left resting in bed @ end of session.     THERAPEUTIC EXERCISES Goal #4     Goal #5     Goal #6     Goal Comments: Goals established on 5/21/2018,Ongoing 05/23/18

## 2018-05-23 NOTE — CONSULTS
INFECTIOUS DISEASE CONSULT NOTE    Edwardo Borne Patient Status:  Inpatient    3/1/1945 MRN [id number]   [facility] 3SW-A Attending Lise Alba MD   Hosp Day # 4 PCP No prima [Penicillins]      RASH, SWELLING    Medications:    Current Facility-Administered Medications:   •  Vancomycin HCl (VANCOCIN) 1,000 mg in sodium chloride 0.9 % 250 mL IVPB add-vantage, 15 mg/kg (Adjusted), Intravenous, Q12H  •  docusate sodium (COLACE) ca foul odor or periulcer cellulitis  Integument: No rash  LINES: RUE PICC clean    Laboratory Data:    Lab Results  Component Value Date   CREATSERUM 0.72 05/23/2018       Microbiologic Data:   (this admission)    Reviewed in EMR    Imaging:  X-Ray    Imagin

## 2018-05-23 NOTE — PROGRESS NOTES
[facility]  Progress Note     Jay Oliva Patient Status:  Outpatient in a Bed    3/1/1945 MRN [id number]   [facility] 3SW-A Attending Remington Milligan MD   Hosp Day # 4 PCP No primary care provider on file.      Chief Compl Increased lung markings are seen at both bases. The left CP angle is blunted. Heart and pulmonary vessels are normal caliber. No   pneumothorax.     =====  CONCLUSION:    1. Satisfactory position of a right PICC.   2.  Increased markings are seen at bot d/c per ID     Justo Kamara MD  5/23/2018

## 2018-05-23 NOTE — CM/SW NOTE
Update sent via [facility] to [address]. Spoke with Bruce Ayala in admissions- pt accepted back to [facility] today. 2PM d/c time discussed. Spoke with RN- pt will require ambulance transport. She notes that MD did order ID consult re: IVAB.   Pt currently ha

## 2018-05-23 NOTE — WOUND PROGRESS NOTE
[facility]  Inpatient Wound Care Contact Note    Braulio Ianbraeden Patient Status:  Inpatient    3/1/1945 MRN [id number]   [facility] 3SW-A Attending Diane Rendon MD   Hosp Day # 4 PCP No primary care provider on file.      Talked

## 2018-05-23 NOTE — PLAN OF CARE
Dr. Julita Norton here, removed wound vac and changed dressing. Wet to dry /gauze dressing with kerlix applied. Some bloody drainage to old dressing. OK to discharge to [facility] with wound vac today.  Wound care RN here shortly after, will plan to send patient w

## 2018-05-23 NOTE — CM/SW NOTE
05/23/18 1500   Discharge disposition   Expected discharge disposition Skilled Nurs   Name of [address]   Patient is Discharged to a [address]   Discharge transportation [address]   Pt insisted on me

## 2018-05-24 ENCOUNTER — NURSE ONLY (OUTPATIENT)
Dept: LAB | Age: 73
End: 2018-05-24
Attending: FAMILY MEDICINE
Payer: MEDICARE

## 2018-05-24 DIAGNOSIS — I25.10 CAD (CORONARY ARTERY DISEASE): ICD-10-CM

## 2018-05-24 DIAGNOSIS — I73.9 PVD (PERIPHERAL VASCULAR DISEASE) (HCC): Primary | ICD-10-CM

## 2018-05-24 DIAGNOSIS — Z89.519: ICD-10-CM

## 2018-05-24 PROCEDURE — 80053 COMPREHEN METABOLIC PANEL: CPT

## 2018-05-24 PROCEDURE — 86140 C-REACTIVE PROTEIN: CPT

## 2018-05-24 PROCEDURE — 36415 COLL VENOUS BLD VENIPUNCTURE: CPT

## 2018-05-24 PROCEDURE — 85025 COMPLETE CBC W/AUTO DIFF WBC: CPT

## 2018-05-24 PROCEDURE — 85652 RBC SED RATE AUTOMATED: CPT

## 2018-05-24 PROCEDURE — 80202 ASSAY OF VANCOMYCIN: CPT

## 2018-05-25 ENCOUNTER — SNF VISIT (OUTPATIENT)
Dept: INTERNAL MEDICINE CLINIC | Age: 73
End: 2018-05-25

## 2018-05-25 VITALS
TEMPERATURE: 99 F | OXYGEN SATURATION: 92 % | HEART RATE: 90 BPM | SYSTOLIC BLOOD PRESSURE: 133 MMHG | DIASTOLIC BLOOD PRESSURE: 66 MMHG | BODY MASS INDEX: 33 KG/M2 | WEIGHT: 206 LBS

## 2018-05-25 DIAGNOSIS — M15.3 OTHER SECONDARY OSTEOARTHRITIS OF MULTIPLE SITES: ICD-10-CM

## 2018-05-25 DIAGNOSIS — I73.9 PVD (PERIPHERAL VASCULAR DISEASE) (HCC): ICD-10-CM

## 2018-05-25 DIAGNOSIS — R53.1 WEAKNESS: ICD-10-CM

## 2018-05-25 DIAGNOSIS — L93.0 LUPUS ERYTHEMATOSUS, UNSPECIFIED FORM: ICD-10-CM

## 2018-05-25 DIAGNOSIS — I49.9 IRREGULAR HEART RATE: ICD-10-CM

## 2018-05-25 DIAGNOSIS — G62.9 NEUROPATHY: ICD-10-CM

## 2018-05-25 DIAGNOSIS — Z89.512 S/P BKA (BELOW KNEE AMPUTATION) UNILATERAL, LEFT (HCC): Primary | ICD-10-CM

## 2018-05-25 DIAGNOSIS — J42 CHRONIC BRONCHITIS, UNSPECIFIED CHRONIC BRONCHITIS TYPE (HCC): ICD-10-CM

## 2018-05-25 DIAGNOSIS — D50.9 IRON DEFICIENCY ANEMIA, UNSPECIFIED IRON DEFICIENCY ANEMIA TYPE: ICD-10-CM

## 2018-05-25 DIAGNOSIS — Z98.890 S/P DEBRIDEMENT: ICD-10-CM

## 2018-05-25 DIAGNOSIS — F41.8 DEPRESSION WITH ANXIETY: ICD-10-CM

## 2018-05-25 DIAGNOSIS — R11.0 NAUSEA: ICD-10-CM

## 2018-05-25 PROCEDURE — 99310 SBSQ NF CARE HIGH MDM 45: CPT | Performed by: NURSE PRACTITIONER

## 2018-05-25 RX ORDER — ASCORBIC ACID 500 MG
500 TABLET ORAL 2 TIMES DAILY
COMMUNITY
Start: 2018-05-25 | End: 2018-06-02

## 2018-05-25 RX ORDER — ALPRAZOLAM 1 MG/1
1 TABLET ORAL DAILY PRN
COMMUNITY
End: 2018-06-20

## 2018-05-25 RX ORDER — ZINC SULFATE 50(220)MG
220 CAPSULE ORAL DAILY
COMMUNITY
Start: 2018-05-25 | End: 2018-06-02

## 2018-05-25 NOTE — PROGRESS NOTES
Hugh Kerr  : 3/1/1945  Age 68year old  female patient is admitted to Facility: [place] [address] for [facility] s/p debridement of recent left BKA incision. [address] [place] [address] Drive date:    18 for left BKA  Discharge date to [facility]:    . HENT: denies nasal congestion, sinus pain or sore throat;  RESPIRATORY: denies shortness of breath, wheezing or cough   CARDIOVASCULAR:denies chest pain, no palpitations , denies syncope, denies orthopnea, denies cough  GI: ---+nausea; denies diarrhea/cons EXTREMITIES/VASCULAR:no cyanosis, clubbing or edema;+right pedal pulse weak, color good, no cyanosis/necrosis  NEUROLOGIC: intact; no sensorimotor deficit, reflexes normal, cranial nerves intact II-XII, follows commands  PSYCHIATRIC: alert and oriented x 3 Sed Rate 0 - 25 mm/Hr 87     Resulting Agency  [facility]      Specimen Collected: 05/24/18  6:15 AM Last Resulted: 05/24/18  9:51 AM                Soham Mae medical records, notes, lab and imaging results reviewed. Medication reconciliation completed. 1. Trazodone 50 mg q HS prn     Depression/Anxiety  1. Monitor mood  2. Xanax 1 mg daily prn prior to drsg changes  3. Increase Cymbalta to 60 mg daily  4. Mirtazapine 7.5 mg q HS  5.  Psych consult    *Greater than 55 minutes spent w/ patient and family, r

## 2018-05-28 ENCOUNTER — NURSE ONLY (OUTPATIENT)
Dept: LAB | Age: 73
End: 2018-05-28
Attending: FAMILY MEDICINE
Payer: COMMERCIAL

## 2018-05-28 DIAGNOSIS — I73.9 PERIPHERAL VASCULAR DISEASE, UNSPECIFIED (HCC): Primary | ICD-10-CM

## 2018-05-28 PROCEDURE — 36415 COLL VENOUS BLD VENIPUNCTURE: CPT

## 2018-05-28 PROCEDURE — 85025 COMPLETE CBC W/AUTO DIFF WBC: CPT

## 2018-05-28 PROCEDURE — 80053 COMPREHEN METABOLIC PANEL: CPT

## 2018-05-28 PROCEDURE — 85652 RBC SED RATE AUTOMATED: CPT

## 2018-05-30 ENCOUNTER — SNF VISIT (OUTPATIENT)
Dept: INTERNAL MEDICINE CLINIC | Age: 73
End: 2018-05-30

## 2018-05-30 ENCOUNTER — NURSE ONLY (OUTPATIENT)
Dept: LAB | Age: 73
End: 2018-05-30
Attending: FAMILY MEDICINE
Payer: MEDICARE

## 2018-05-30 VITALS
HEART RATE: 89 BPM | TEMPERATURE: 98 F | OXYGEN SATURATION: 94 % | DIASTOLIC BLOOD PRESSURE: 70 MMHG | RESPIRATION RATE: 20 BRPM | SYSTOLIC BLOOD PRESSURE: 128 MMHG

## 2018-05-30 DIAGNOSIS — F41.8 DEPRESSION WITH ANXIETY: ICD-10-CM

## 2018-05-30 DIAGNOSIS — R53.1 WEAKNESS: ICD-10-CM

## 2018-05-30 DIAGNOSIS — Z79.2 LONG TERM (CURRENT) USE OF ANTIBIOTICS: Primary | ICD-10-CM

## 2018-05-30 DIAGNOSIS — Z98.890 S/P DEBRIDEMENT: ICD-10-CM

## 2018-05-30 DIAGNOSIS — D50.9 IRON DEFICIENCY ANEMIA, UNSPECIFIED IRON DEFICIENCY ANEMIA TYPE: ICD-10-CM

## 2018-05-30 DIAGNOSIS — Z89.512 S/P BKA (BELOW KNEE AMPUTATION) UNILATERAL, LEFT (HCC): Primary | ICD-10-CM

## 2018-05-30 PROCEDURE — 36415 COLL VENOUS BLD VENIPUNCTURE: CPT

## 2018-05-30 PROCEDURE — 99308 SBSQ NF CARE LOW MDM 20: CPT | Performed by: NURSE PRACTITIONER

## 2018-05-30 PROCEDURE — 80202 ASSAY OF VANCOMYCIN: CPT

## 2018-05-30 NOTE — PROGRESS NOTES
Kleber Santo, 11/1945, 68year old, female    Chief Complaint:  Patient presents with:   Follow - Up: s/p I&/D of infected Left BKA dehiscence  Weakness       Subjective:  PMH significant for Thyroid nodules, COPD, SHYAM, CAD, PVD, DVT, Lupus, PN, and EYES: PERRLA, EOMI, sclera anicteric, conjunctiva normal; there is no nystagmus, no drainage from eyes  HENT: normocephalic; normal nose, no nasal drainage, mucous membranes pink, moist, pharynx no exudate, no visible cerumen.   NECK: supple; FROM; no JVD, Ref Range & Units 5/28/18  5:42 AM   Sed Rate 0 - 25 mm/Hr 77     Resulting Agency  [facility]      Specimen Collected: 05/28/18  5:42 AM Last Resulted: 05/28/18  8:38 AM              VANCOMYCIN TROUGH, SERUM   Order: [id number]   Collected:  5/30/2018  5: 4. Simethicone 80 mg chew QID prn     Anorexia  1. Mirtazapine 7.5 mg q HS     Vitamin D deficiency  1. Ergocalciferol 50,000u 1x/wk x 8 wks/6.13.18  2. Calcium and vitamin D 600mg/400u BID  3. Repeat vitamin D in 10 wks     Bowel Regime  1.  Colace 100 mg

## 2018-05-31 ENCOUNTER — NURSE ONLY (OUTPATIENT)
Dept: LAB | Age: 73
End: 2018-05-31
Attending: FAMILY MEDICINE
Payer: MEDICARE

## 2018-05-31 DIAGNOSIS — L40.3 SAPHO SYNDROME (HCC): ICD-10-CM

## 2018-05-31 DIAGNOSIS — M86.9 SAPHO SYNDROME (HCC): ICD-10-CM

## 2018-05-31 DIAGNOSIS — R41.9 NEUROCOGNITIVE DISORDER: ICD-10-CM

## 2018-05-31 DIAGNOSIS — Z89.512 STATUS POST BILATERAL BELOW KNEE AMPUTATION (HCC): ICD-10-CM

## 2018-05-31 DIAGNOSIS — M65.9 SAPHO SYNDROME (HCC): ICD-10-CM

## 2018-05-31 DIAGNOSIS — I73.9 PERIPHERAL VASCULAR DISEASE, UNSPECIFIED (HCC): Primary | ICD-10-CM

## 2018-05-31 DIAGNOSIS — Z89.511 STATUS POST BILATERAL BELOW KNEE AMPUTATION (HCC): ICD-10-CM

## 2018-05-31 DIAGNOSIS — M85.80 SAPHO SYNDROME (HCC): ICD-10-CM

## 2018-05-31 DIAGNOSIS — M62.81 MUSCLE WEAKNESS (GENERALIZED): ICD-10-CM

## 2018-05-31 DIAGNOSIS — L70.9 SAPHO SYNDROME (HCC): ICD-10-CM

## 2018-06-04 ENCOUNTER — NURSE ONLY (OUTPATIENT)
Dept: LAB | Age: 73
End: 2018-06-04
Attending: FAMILY MEDICINE
Payer: MEDICARE

## 2018-06-04 ENCOUNTER — SNF VISIT (OUTPATIENT)
Dept: INTERNAL MEDICINE CLINIC | Age: 73
End: 2018-06-04

## 2018-06-04 VITALS
TEMPERATURE: 97 F | HEART RATE: 73 BPM | SYSTOLIC BLOOD PRESSURE: 124 MMHG | DIASTOLIC BLOOD PRESSURE: 63 MMHG | RESPIRATION RATE: 18 BRPM | OXYGEN SATURATION: 96 %

## 2018-06-04 DIAGNOSIS — F41.8 DEPRESSION WITH ANXIETY: ICD-10-CM

## 2018-06-04 DIAGNOSIS — D50.9 IRON DEFICIENCY ANEMIA, UNSPECIFIED IRON DEFICIENCY ANEMIA TYPE: ICD-10-CM

## 2018-06-04 DIAGNOSIS — M86.9 OSTEOMYELITIS (HCC): ICD-10-CM

## 2018-06-04 DIAGNOSIS — G62.9 NEUROPATHY: ICD-10-CM

## 2018-06-04 DIAGNOSIS — B99.9 INFECTION: Primary | ICD-10-CM

## 2018-06-04 DIAGNOSIS — T87.9 BKA STUMP COMPLICATION (HCC): ICD-10-CM

## 2018-06-04 DIAGNOSIS — Z98.890 S/P DEBRIDEMENT: ICD-10-CM

## 2018-06-04 DIAGNOSIS — Z89.512 S/P BKA (BELOW KNEE AMPUTATION) UNILATERAL, LEFT (HCC): Primary | ICD-10-CM

## 2018-06-04 PROCEDURE — 80053 COMPREHEN METABOLIC PANEL: CPT

## 2018-06-04 PROCEDURE — 36415 COLL VENOUS BLD VENIPUNCTURE: CPT

## 2018-06-04 PROCEDURE — 99309 SBSQ NF CARE MODERATE MDM 30: CPT | Performed by: NURSE PRACTITIONER

## 2018-06-04 PROCEDURE — 85025 COMPLETE CBC W/AUTO DIFF WBC: CPT

## 2018-06-04 PROCEDURE — 86140 C-REACTIVE PROTEIN: CPT

## 2018-06-04 PROCEDURE — 85652 RBC SED RATE AUTOMATED: CPT

## 2018-06-04 NOTE — PROGRESS NOTES
Kleber Santo, 11/1945, 68year old, female    Chief Complaint:  Patient presents with:   Follow - Up: s/p I&/D of infected Left BKA dehiscence  Wound Recheck       Subjective:  PMH significant for Thyroid nodules, COPD, SHYAM, CAD, PVD, DVT, Lupus, PN, NECK: supple; FROM; no JVD, no TMG, no carotid bruits  BREAST: ---normal skin  RESPIRATORY:---Clear to auscultation anteriorly and posteriorly; decreased at bases, no wheezing  CARDIOVASCULAR: S1, S2 normal, IRREG; no S3, no S4; , no click, no murmur  ABDO Collected:  6/4/2018  5:00 AM Status:  Final result Dx: Infection; Osteomyelitis ([facility] [address] BKA s. ..     Ref Range & Units 6/4/18  5:00 AM   Sed Rate 0 - 25 mm/Hr 53     Resulting Agency  [facility]      Specimen Collected: 06/04/18  5:00 AM Last Resulted: 06/ 2. Calcium and vitamin D 600mg/400u BID  3. Repeat vitamin D in 10 wks     Bowel Regime  1. Colace 100 mg bid  2. Miralax 17 gm qd prn  3. MOM 30cc daily prn     Insomnia  1. Trazodone 50 mg q HS prn     Depression/Anxiety  1. Monitor mood  2.  Xanax 1 mg d

## 2018-06-06 ENCOUNTER — SNF VISIT (OUTPATIENT)
Dept: INTERNAL MEDICINE CLINIC | Age: 73
End: 2018-06-06

## 2018-06-06 VITALS
TEMPERATURE: 98 F | OXYGEN SATURATION: 90 % | SYSTOLIC BLOOD PRESSURE: 139 MMHG | RESPIRATION RATE: 19 BRPM | HEART RATE: 77 BPM | DIASTOLIC BLOOD PRESSURE: 76 MMHG

## 2018-06-06 DIAGNOSIS — Z89.512 S/P BKA (BELOW KNEE AMPUTATION) UNILATERAL, LEFT (HCC): Primary | ICD-10-CM

## 2018-06-06 DIAGNOSIS — R53.1 WEAKNESS: ICD-10-CM

## 2018-06-06 DIAGNOSIS — Z98.890 S/P DEBRIDEMENT: ICD-10-CM

## 2018-06-06 PROCEDURE — 99307 SBSQ NF CARE SF MDM 10: CPT | Performed by: NURSE PRACTITIONER

## 2018-06-06 NOTE — PROGRESS NOTES
Amanda Meza, 11/1945, 68year old, female    Chief Complaint:  Patient presents with:   Follow - Up: s/p I&/D of infected Left BKA dehiscence  Weakness       Subjective:  PMH significant for Thyroid nodules, COPD, SHYAM, CAD, PVD, DVT, Lupus, PN, and carotid bruits  BREAST: ---normal skin  RESPIRATORY:---Clear to auscultation anteriorly and posteriorly; decreased at bases, no wheezing  CARDIOVASCULAR: S1, S2 normal, REG; no S3, no S4; , no click, no murmur  ABDOMEN:  normal active BS+, soft, nondistend BID  2. CBC weekly     COPD/Smoker/SHYAM  1. Vitals q shift  2. RT following  3. O2 @ 3 LPM NC; titrate to maintain sats >90%  4. Overnight pulse ox study  5. Chest CT per pulmonology     H/O DVT  1. Monitor     Thyroid d/o (nodules)  1.  Monitor     Nausea/H

## 2018-06-07 ENCOUNTER — NURSE ONLY (OUTPATIENT)
Dept: LAB | Age: 73
End: 2018-06-07
Attending: FAMILY MEDICINE
Payer: MEDICARE

## 2018-06-07 DIAGNOSIS — I73.9 PERIPHERAL VASCULAR DISEASE, UNSPECIFIED (HCC): Primary | ICD-10-CM

## 2018-06-07 DIAGNOSIS — M62.81 MUSCLE WEAKNESS (GENERALIZED): ICD-10-CM

## 2018-06-07 DIAGNOSIS — Z79.899 LONG-TERM USE OF HIGH-RISK MEDICATION: ICD-10-CM

## 2018-06-07 DIAGNOSIS — Z89.511 STATUS POST BILATERAL BELOW KNEE AMPUTATION (HCC): ICD-10-CM

## 2018-06-07 DIAGNOSIS — Z89.512 STATUS POST BILATERAL BELOW KNEE AMPUTATION (HCC): ICD-10-CM

## 2018-06-08 ENCOUNTER — SNF VISIT (OUTPATIENT)
Dept: INTERNAL MEDICINE CLINIC | Age: 73
End: 2018-06-08

## 2018-06-08 VITALS
RESPIRATION RATE: 18 BRPM | DIASTOLIC BLOOD PRESSURE: 76 MMHG | TEMPERATURE: 99 F | SYSTOLIC BLOOD PRESSURE: 136 MMHG | HEART RATE: 79 BPM | OXYGEN SATURATION: 92 %

## 2018-06-08 DIAGNOSIS — Z98.890 S/P DEBRIDEMENT: ICD-10-CM

## 2018-06-08 DIAGNOSIS — Z89.512 S/P BKA (BELOW KNEE AMPUTATION) UNILATERAL, LEFT (HCC): Primary | ICD-10-CM

## 2018-06-08 DIAGNOSIS — G62.9 NEUROPATHY: ICD-10-CM

## 2018-06-08 PROCEDURE — 99308 SBSQ NF CARE LOW MDM 20: CPT | Performed by: NURSE PRACTITIONER

## 2018-06-08 NOTE — PROGRESS NOTES
Hans Duncan, 11/1945, 68year old, female    Chief Complaint:  Patient presents with:   Follow - Up: s/p I&/D of infected Left BKA dehiscence  Wound Recheck       Subjective:  PMH significant for Thyroid nodules, COPD, SHYAM, CAD, PVD, DVT, Lupus, PN, BS+, soft, nondistended; no organomegaly, no masses; no bruits; nontender, no guarding, no rebound tenderness.   :Deferred  LYMPHATIC:no lymphedema  MUSCULOSKELETAL: no acute synovitis upper or lower extremity  EXTREMITIES/VASCULAR:no cyanosis, clubbing o (nodules)  1. Monitor     Nausea/Heartburn/Abd bloating  1. Zofran 4 mg q8h prn  2. Reglan 5 mg q6h prn  3. Omeprazole 40 mg qd  4. Simethicone 80 mg chew QID prn     Anorexia  1. Mirtazapine 7.5 mg q HS     Vitamin D deficiency  1.  Ergocalciferol 50,000u

## 2018-06-11 ENCOUNTER — NURSE ONLY (OUTPATIENT)
Dept: LAB | Age: 73
End: 2018-06-11
Attending: FAMILY MEDICINE
Payer: MEDICARE

## 2018-06-11 DIAGNOSIS — R53.1 WEAKNESS: Primary | ICD-10-CM

## 2018-06-11 PROCEDURE — 36415 COLL VENOUS BLD VENIPUNCTURE: CPT

## 2018-06-11 PROCEDURE — 85652 RBC SED RATE AUTOMATED: CPT

## 2018-06-11 PROCEDURE — 80053 COMPREHEN METABOLIC PANEL: CPT

## 2018-06-11 PROCEDURE — 85025 COMPLETE CBC W/AUTO DIFF WBC: CPT

## 2018-06-11 PROCEDURE — 86140 C-REACTIVE PROTEIN: CPT

## 2018-06-13 ENCOUNTER — SNF VISIT (OUTPATIENT)
Dept: INTERNAL MEDICINE CLINIC | Age: 73
End: 2018-06-13

## 2018-06-13 VITALS
BODY MASS INDEX: 30 KG/M2 | DIASTOLIC BLOOD PRESSURE: 64 MMHG | SYSTOLIC BLOOD PRESSURE: 120 MMHG | OXYGEN SATURATION: 97 % | TEMPERATURE: 98 F | WEIGHT: 186 LBS | HEART RATE: 86 BPM | RESPIRATION RATE: 20 BRPM

## 2018-06-13 DIAGNOSIS — Z89.512 S/P BKA (BELOW KNEE AMPUTATION) UNILATERAL, LEFT (HCC): Primary | ICD-10-CM

## 2018-06-13 DIAGNOSIS — R53.1 WEAKNESS: ICD-10-CM

## 2018-06-13 DIAGNOSIS — Z98.890 S/P DEBRIDEMENT: ICD-10-CM

## 2018-06-13 PROCEDURE — 99308 SBSQ NF CARE LOW MDM 20: CPT | Performed by: NURSE PRACTITIONER

## 2018-06-13 NOTE — PROGRESS NOTES
Jerry Layne, 11/1945, 68year old, female    Chief Complaint:  Patient presents with:   Follow - Up: s/p I&/D of infected Left BKA dehiscence  Weakness       Subjective:  PMH significant for Thyroid nodules, COPD, SHYAM, CAD, PVD, DVT, Lupus, PN, and RESPIRATORY:---Clear to auscultation anteriorly and posteriorly; decreased at bases, no wheezing  CARDIOVASCULAR: S1, S2 normal, REG; no S3, no S4; , no click, no murmur  ABDOMEN:  normal active BS+, soft, nondistended; no organomegaly, no masses; no bruit Resulting Agency  [facility]      Specimen Collected: 06/11/18  5:05 AM Last Resulted: 06/11/18  8:49 AM                Assessment and plan:  S/P I&D 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  Vancomycin compl 1. Trazodone 50 mg q HS prn     Depression/Anxiety  1. Monitor mood  2. Xanax 1 mg daily prn prior to drsg changes  3. Cymbalta to 60 mg daily  4. Mirtazapine 7.5 mg q HS  5.  Psych consult    DENISSE Albright  6/13/2018  12:30 PM

## 2018-06-14 ENCOUNTER — NURSE ONLY (OUTPATIENT)
Dept: LAB | Age: 73
End: 2018-06-14
Attending: FAMILY MEDICINE
Payer: MEDICARE

## 2018-06-14 DIAGNOSIS — Z89.512 STATUS POST BILATERAL BELOW KNEE AMPUTATION (HCC): ICD-10-CM

## 2018-06-14 DIAGNOSIS — I73.9 PERIPHERAL VASCULAR DISEASE, UNSPECIFIED (HCC): Primary | ICD-10-CM

## 2018-06-14 DIAGNOSIS — Z89.511 STATUS POST BILATERAL BELOW KNEE AMPUTATION (HCC): ICD-10-CM

## 2018-06-14 DIAGNOSIS — M62.81 MUSCLE WEAKNESS (GENERALIZED): ICD-10-CM

## 2018-06-18 ENCOUNTER — TELEPHONE (OUTPATIENT)
Dept: FAMILY MEDICINE CLINIC | Facility: CLINIC | Age: 73
End: 2018-06-18

## 2018-06-18 NOTE — TELEPHONE ENCOUNTER
Gary Pandey from [facility] left a voicemail asking for verbal orders as follows\"  Home Care, Pt & OT, [facility]. Pt went into hospital 5-8-18, released from [facility] on 6-16-18.

## 2018-06-20 ENCOUNTER — OFFICE VISIT (OUTPATIENT)
Dept: FAMILY MEDICINE CLINIC | Facility: CLINIC | Age: 73
End: 2018-06-20

## 2018-06-20 VITALS
RESPIRATION RATE: 18 BRPM | OXYGEN SATURATION: 98 % | DIASTOLIC BLOOD PRESSURE: 72 MMHG | HEIGHT: 66 IN | WEIGHT: 185 LBS | TEMPERATURE: 99 F | HEART RATE: 76 BPM | SYSTOLIC BLOOD PRESSURE: 102 MMHG | BODY MASS INDEX: 29.73 KG/M2

## 2018-06-20 DIAGNOSIS — F32.A DEPRESSION, UNSPECIFIED DEPRESSION TYPE: ICD-10-CM

## 2018-06-20 DIAGNOSIS — Z89.512 S/P BKA (BELOW KNEE AMPUTATION) UNILATERAL, LEFT (HCC): ICD-10-CM

## 2018-06-20 DIAGNOSIS — I73.9 PERIPHERAL VASCULAR DISEASE OF EXTREMITY WITH CLAUDICATION (HCC): Primary | ICD-10-CM

## 2018-06-20 DIAGNOSIS — I25.10 CORONARY ARTERY DISEASE INVOLVING NATIVE HEART WITHOUT ANGINA PECTORIS, UNSPECIFIED VESSEL OR LESION TYPE: ICD-10-CM

## 2018-06-20 DIAGNOSIS — D64.9 ANEMIA, UNSPECIFIED TYPE: ICD-10-CM

## 2018-06-20 PROCEDURE — 99496 TRANSJ CARE MGMT HIGH F2F 7D: CPT | Performed by: FAMILY MEDICINE

## 2018-06-20 RX ORDER — CHOLECALCIFEROL (VITAMIN D3) 25 MCG
1 TABLET,CHEWABLE ORAL DAILY
Qty: 30 CAPSULE | Refills: 3 | Status: SHIPPED | OUTPATIENT
Start: 2018-06-20 | End: 2019-10-10

## 2018-06-20 RX ORDER — HYDROCODONE BITARTRATE AND ACETAMINOPHEN 10; 325 MG/1; MG/1
2 TABLET ORAL EVERY 6 HOURS PRN
Qty: 30 TABLET | Refills: 0 | Status: SHIPPED | OUTPATIENT
Start: 2018-06-20 | End: 2018-06-20

## 2018-06-20 RX ORDER — GABAPENTIN 400 MG/1
400 CAPSULE ORAL 3 TIMES DAILY
Qty: 90 CAPSULE | Refills: 3 | Status: SHIPPED | OUTPATIENT
Start: 2018-06-20 | End: 2018-12-30

## 2018-06-20 RX ORDER — FUROSEMIDE 20 MG/1
20 TABLET ORAL DAILY
Qty: 30 TABLET | Refills: 3 | Status: SHIPPED | OUTPATIENT
Start: 2018-06-20 | End: 2019-01-21

## 2018-06-20 RX ORDER — HYDROCODONE BITARTRATE AND ACETAMINOPHEN 10; 325 MG/1; MG/1
2 TABLET ORAL EVERY 6 HOURS PRN
Qty: 30 TABLET | Refills: 0 | Status: SHIPPED | OUTPATIENT
Start: 2018-06-20 | End: 2018-06-27

## 2018-06-20 RX ORDER — MIRTAZAPINE 7.5 MG/1
7.5 TABLET, FILM COATED ORAL NIGHTLY
Qty: 30 TABLET | Refills: 3 | Status: SHIPPED | OUTPATIENT
Start: 2018-06-20 | End: 2019-01-21

## 2018-06-20 RX ORDER — TRAZODONE HYDROCHLORIDE 50 MG/1
50 TABLET ORAL NIGHTLY
Qty: 30 TABLET | Refills: 3 | Status: ON HOLD | OUTPATIENT
Start: 2018-06-20 | End: 2018-07-16

## 2018-06-20 RX ORDER — DULOXETIN HYDROCHLORIDE 30 MG/1
60 CAPSULE, DELAYED RELEASE ORAL DAILY
Qty: 60 CAPSULE | Refills: 3 | Status: SHIPPED | OUTPATIENT
Start: 2018-06-20 | End: 2018-10-16 | Stop reason: ALTCHOICE

## 2018-06-20 NOTE — PROGRESS NOTES
HPI:    Thad Whitley is a 68year old female here today for hospital follow up.    She was discharged from [facility]  to [address] [place] [address] Date: 4/14  Discharge Date: 6/16  Hospital Discharge Diagnosis:     Interactive contact within 2 business days 600-400 MG-UNIT Oral Tab Take 1 tablet by mouth 2 (two) times daily. PEG 3350 Oral Powd Pack Take 17 g by mouth daily as needed. simethicone 80 MG Oral Chew Tab Chew 80 mg by mouth every 6 (six) hours as needed.  Upset Stomach    Metoclopramide HCl 5 MG denies bleeding  ENDOCRINE: denies thyroid history  ALL/ASTHMA: denies hx of allergy or asthma    PHYSICAL EXAM:   No LMP recorded.  Patient is postmenopausal.  Estimated body mass index is 29.86 kg/m² as calculated from the following:    Height as of this PANEL; Future    Depression, unspecified depression type  Improving on Cymbalta, mirtazapine  Anemia, unspecified type  -     CBC, PLATELET; NO DIFFERENTIAL; Future    Other orders  -     Cyanocobalamin (B-12) 1000 MCG Oral Cap;  Take 1 capsule by mouth daniel (six) hours as needed. aspirin 81 MG Oral Tab 30 tablet 6      Sig: Take 1 tablet (81 mg total) by mouth daily.            Imaging & Consults:  None         Transitional Care Management Certification:  I certify that the following are true:  Communicat

## 2018-06-22 ENCOUNTER — TELEPHONE (OUTPATIENT)
Dept: FAMILY MEDICINE CLINIC | Facility: CLINIC | Age: 73
End: 2018-06-22

## 2018-06-22 NOTE — TELEPHONE ENCOUNTER
Dietrich Lanes from [facility] called to inform  that she is moving today's  visit to next week as per Pt's request.  New orders needed.

## 2018-06-22 NOTE — TELEPHONE ENCOUNTER
Pt's daughter requesting to  Rx for Norco in office tomorrow, said they forgot to ask for refill on 6/20/18 when they were in office

## 2018-06-25 NOTE — TELEPHONE ENCOUNTER
Greenlee Flakes [facility] again asking for orders from Dr Flower Cunningham. Jamar Cortez message left on 6-22-18. Wants to see Pt today.  Verbal orders ok - [phone]

## 2018-06-27 ENCOUNTER — HOSPITAL ENCOUNTER (OUTPATIENT)
Dept: CT IMAGING | Facility: HOSPITAL | Age: 73
Discharge: HOME | End: 2018-06-27
Attending: FAMILY MEDICINE
Payer: MEDICARE

## 2018-06-27 DIAGNOSIS — R91.8 PULMONARY INFILTRATES: ICD-10-CM

## 2018-06-27 DIAGNOSIS — J44.9 COPD (CHRONIC OBSTRUCTIVE PULMONARY DISEASE) (HCC): ICD-10-CM

## 2018-06-27 PROCEDURE — 71250 CT THORAX DX C-: CPT | Performed by: FAMILY MEDICINE

## 2018-06-27 RX ORDER — HYDROCODONE BITARTRATE AND ACETAMINOPHEN 10; 325 MG/1; MG/1
1 TABLET ORAL EVERY 8 HOURS PRN
Qty: 45 TABLET | Refills: 0 | Status: ON HOLD | OUTPATIENT
Start: 2018-06-27 | End: 2018-07-16

## 2018-06-28 DIAGNOSIS — R93.89 ABNORMAL CT SCAN, CHEST: Primary | ICD-10-CM

## 2018-06-28 DIAGNOSIS — N63.20 BREAST MASS, LEFT: ICD-10-CM

## 2018-06-28 NOTE — PROGRESS NOTES
Results of abnormal CT scan of the chest and the left breast discussed with the patient's daughter  Diagnostic mammogram ordered  The daughter will call radiology and make appointment

## 2018-06-28 NOTE — DISCHARGE SUMMARY
Vascular Surgery  Surgical Discharge Summary    Admission Date: 5/17/2018   D/C Date: 5/23/2018  Discharge Diagnosis: amputation infection  Discharge Condition: good      HISTORY OF PRESENT ILLNESS: Hermila Grande is a 68year old  female who Take 5 mg by mouth every 6 (six) hours as needed. Nausea  Disp:  Rfl:    Multiple Vitamin (MULTI-VITAMIN DAILY) Oral Tab Take 1 tablet by mouth daily.  Disp:  Rfl:    TraZODone HCl 50 MG Oral Tab Take 1 tablet (50 mg total) by mouth nightly as needed for Sl (1.676 m)   Wt 200 lb (90.7 kg)   SpO2 96%   BMI 32.28 kg/m²          DISCHARGE INSTRUCTIONS: Discharge instructions were reviewed with the patient including follow up within 2 weeks

## 2018-07-03 ENCOUNTER — TELEPHONE (OUTPATIENT)
Dept: FAMILY MEDICINE CLINIC | Facility: CLINIC | Age: 73
End: 2018-07-03

## 2018-07-03 NOTE — TELEPHONE ENCOUNTER
HOLLIE Ding notified of verbal approval     Pt will try to make appt next week for F/U per Santa Ding

## 2018-07-03 NOTE — TELEPHONE ENCOUNTER
Sita from [facility] left a voicemail asking for a change in the Wound Care Order. Needs to add Calcium A dressing & foam for 3 x per week due to moderate to large amount of drainage. Also cyndy Pt has a new surgical wound.

## 2018-07-05 ENCOUNTER — TELEPHONE (OUTPATIENT)
Dept: FAMILY MEDICINE CLINIC | Facility: CLINIC | Age: 73
End: 2018-07-05

## 2018-07-05 DIAGNOSIS — N63.20 BREAST MASS, LEFT: ICD-10-CM

## 2018-07-05 DIAGNOSIS — R93.89 ABNORMAL CT SCAN, CHEST: Primary | ICD-10-CM

## 2018-07-05 NOTE — TELEPHONE ENCOUNTER
Berkley from [facility] called asking to move Social Work from this week to last week.

## 2018-07-05 NOTE — TELEPHONE ENCOUNTER
Emy Herron from [address] [facility] left a message stating Pt is having an Mammogram 7/10/18. She said Pt needs a ultrasound. Orders need to be entered.   (No other phone # left)

## 2018-07-05 NOTE — TELEPHONE ENCOUNTER
Left message on answering machine that Dr Luis Holguin said it is ok to move  from this week to next week.

## 2018-07-05 NOTE — TELEPHONE ENCOUNTER
Correction: [facility] is asking to move  from this week to next week.     Routed to Dr Karina Allen

## 2018-07-05 NOTE — TELEPHONE ENCOUNTER
VMML with THE [facility] Mammography department informing order for left breast Ultrasound was entered. Also informed them to make patient aware procedure may not be covered by Medicare with the associated diagnoses as this test is diagnostic not screening.

## 2018-07-10 ENCOUNTER — HOSPITAL ENCOUNTER (INPATIENT)
Facility: HOSPITAL | Age: 73
LOS: 6 days | Discharge: HOME HEALTH | DRG: 475 | End: 2018-07-16
Attending: EMERGENCY MEDICINE | Admitting: HOSPITALIST
Payer: MEDICARE

## 2018-07-10 ENCOUNTER — APPOINTMENT (OUTPATIENT)
Dept: GENERAL RADIOLOGY | Facility: HOSPITAL | Age: 73
DRG: 475 | End: 2018-07-10
Attending: EMERGENCY MEDICINE
Payer: MEDICARE

## 2018-07-10 DIAGNOSIS — T14.8XXA WOUND INFECTION: Primary | ICD-10-CM

## 2018-07-10 DIAGNOSIS — L08.9 WOUND INFECTION: Primary | ICD-10-CM

## 2018-07-10 LAB
ALBUMIN SERPL-MCNC: 2.6 G/DL (ref 3.5–4.8)
ALP LIVER SERPL-CCNC: 77 U/L (ref 55–142)
ALT SERPL-CCNC: 14 U/L (ref 14–54)
AST SERPL-CCNC: 13 U/L (ref 15–41)
BASOPHILS # BLD AUTO: 0.12 X10(3) UL (ref 0–0.1)
BASOPHILS NFR BLD AUTO: 1 %
BILIRUB SERPL-MCNC: 0.3 MG/DL (ref 0.1–2)
BUN BLD-MCNC: 16 MG/DL (ref 8–20)
C-REACTIVE PROTEIN: 7.87 MG/DL (ref ?–1)
CALCIUM BLD-MCNC: 10 MG/DL (ref 8.3–10.3)
CHLORIDE: 103 MMOL/L (ref 101–111)
CO2: 27 MMOL/L (ref 22–32)
CREAT BLD-MCNC: 1.19 MG/DL (ref 0.55–1.02)
EOSINOPHIL # BLD AUTO: 0.26 X10(3) UL (ref 0–0.3)
EOSINOPHIL NFR BLD AUTO: 2.2 %
ERYTHROCYTE [DISTWIDTH] IN BLOOD BY AUTOMATED COUNT: 13.7 % (ref 11.5–16)
GLUCOSE BLD-MCNC: 103 MG/DL (ref 70–99)
HCT VFR BLD AUTO: 34.3 % (ref 34–50)
HGB BLD-MCNC: 10.6 G/DL (ref 12–16)
IMMATURE GRANULOCYTE COUNT: 0.03 X10(3) UL (ref 0–1)
IMMATURE GRANULOCYTE RATIO %: 0.3 %
LYMPHOCYTES # BLD AUTO: 2.27 X10(3) UL (ref 0.9–4)
LYMPHOCYTES NFR BLD AUTO: 19.6 %
M PROTEIN MFR SERPL ELPH: 7.1 G/DL (ref 6.1–8.3)
MCH RBC QN AUTO: 28.7 PG (ref 27–33.2)
MCHC RBC AUTO-ENTMCNC: 30.9 G/DL (ref 31–37)
MCV RBC AUTO: 93 FL (ref 81–100)
MONOCYTES # BLD AUTO: 0.77 X10(3) UL (ref 0.1–1)
MONOCYTES NFR BLD AUTO: 6.7 %
NEUTROPHIL ABS PRELIM: 8.11 X10 (3) UL (ref 1.3–6.7)
NEUTROPHILS # BLD AUTO: 8.11 X10(3) UL (ref 1.3–6.7)
NEUTROPHILS NFR BLD AUTO: 70.2 %
PLATELET # BLD AUTO: 335 10(3)UL (ref 150–450)
POTASSIUM SERPL-SCNC: 4.7 MMOL/L (ref 3.6–5.1)
RBC # BLD AUTO: 3.69 X10(6)UL (ref 3.8–5.1)
RED CELL DISTRIBUTION WIDTH-SD: 46.3 FL (ref 35.1–46.3)
SED RATE-ML: 64 MM/HR (ref 0–25)
SODIUM SERPL-SCNC: 137 MMOL/L (ref 136–144)
WBC # BLD AUTO: 11.6 X10(3) UL (ref 4–13)

## 2018-07-10 PROCEDURE — 99223 1ST HOSP IP/OBS HIGH 75: CPT | Performed by: HOSPITALIST

## 2018-07-10 PROCEDURE — 73560 X-RAY EXAM OF KNEE 1 OR 2: CPT | Performed by: EMERGENCY MEDICINE

## 2018-07-10 RX ORDER — FUROSEMIDE 20 MG/1
20 TABLET ORAL DAILY
Status: DISCONTINUED | OUTPATIENT
Start: 2018-07-11 | End: 2018-07-10

## 2018-07-10 RX ORDER — ENOXAPARIN SODIUM 100 MG/ML
40 INJECTION SUBCUTANEOUS DAILY
Status: DISCONTINUED | OUTPATIENT
Start: 2018-07-10 | End: 2018-07-16

## 2018-07-10 RX ORDER — MIRTAZAPINE 15 MG/1
7.5 TABLET, FILM COATED ORAL NIGHTLY
Status: DISCONTINUED | OUTPATIENT
Start: 2018-07-10 | End: 2018-07-16

## 2018-07-10 RX ORDER — SODIUM CHLORIDE 9 MG/ML
INJECTION, SOLUTION INTRAVENOUS CONTINUOUS
Status: DISCONTINUED | OUTPATIENT
Start: 2018-07-10 | End: 2018-07-11

## 2018-07-10 RX ORDER — ASPIRIN 81 MG/1
81 TABLET ORAL DAILY
Status: DISCONTINUED | OUTPATIENT
Start: 2018-07-11 | End: 2018-07-16

## 2018-07-10 RX ORDER — METOCLOPRAMIDE 10 MG/1
5 TABLET ORAL EVERY 6 HOURS PRN
Status: DISCONTINUED | OUTPATIENT
Start: 2018-07-10 | End: 2018-07-16

## 2018-07-10 RX ORDER — MELATONIN
325 2 TIMES DAILY WITH MEALS
Status: DISCONTINUED | OUTPATIENT
Start: 2018-07-10 | End: 2018-07-16

## 2018-07-10 RX ORDER — TRAZODONE HYDROCHLORIDE 50 MG/1
50 TABLET ORAL NIGHTLY PRN
Status: DISCONTINUED | OUTPATIENT
Start: 2018-07-10 | End: 2018-07-16

## 2018-07-10 RX ORDER — ACETAMINOPHEN 325 MG/1
650 TABLET ORAL EVERY 4 HOURS PRN
Status: DISCONTINUED | OUTPATIENT
Start: 2018-07-10 | End: 2018-07-16

## 2018-07-10 RX ORDER — POLYETHYLENE GLYCOL 3350 17 G/17G
17 POWDER, FOR SOLUTION ORAL DAILY PRN
Status: DISCONTINUED | OUTPATIENT
Start: 2018-07-10 | End: 2018-07-16

## 2018-07-10 RX ORDER — DULOXETIN HYDROCHLORIDE 30 MG/1
60 CAPSULE, DELAYED RELEASE ORAL DAILY
Status: DISCONTINUED | OUTPATIENT
Start: 2018-07-11 | End: 2018-07-16

## 2018-07-10 RX ORDER — SIMETHICONE 80 MG
80 TABLET,CHEWABLE ORAL EVERY 6 HOURS PRN
Status: DISCONTINUED | OUTPATIENT
Start: 2018-07-10 | End: 2018-07-16

## 2018-07-10 RX ORDER — HYDROCODONE BITARTRATE AND ACETAMINOPHEN 10; 325 MG/1; MG/1
1 TABLET ORAL EVERY 8 HOURS PRN
Status: DISCONTINUED | OUTPATIENT
Start: 2018-07-10 | End: 2018-07-12

## 2018-07-10 RX ORDER — GABAPENTIN 400 MG/1
400 CAPSULE ORAL 3 TIMES DAILY
Status: DISCONTINUED | OUTPATIENT
Start: 2018-07-10 | End: 2018-07-16

## 2018-07-10 NOTE — ED PROVIDER NOTES
Patient Seen in: [facility] Emergency Department    History   Patient presents with:  Cellulitis (integumentary, infectious)    Stated Complaint:     HPI    45-year-old white female who presents emergency room today for complaint of possible wound i 94  Resp: 14  Temp: 98 °F (36.7 °C)  Temp src: Temporal  SpO2: 92 %  O2 Device: None (Room air)    Current:/74   Pulse 98   Temp 98 °F (36.7 °C) (Temporal)   Resp 16   Ht 167.6 cm (5' 6\")   Wt 83.9 kg   SpO2 93%   BMI 29.86 kg/m²         Physical Ex Abnormality         Status                     ---------                               -----------         ------                     CBC W/ DIFFERENTIAL[[id number]]          Abnormal            Final result                 Please view results for these Wound infection T14. 8XXA, L08.9 7/10/2018 Unknown

## 2018-07-10 NOTE — CONSULTS
[facility] dosing service    Initial Pharmacokinetic Consult for Vancomycin Dosing     Thierry Monica is a 68year old female admitted on 7/10 who is being treated for cellulitis. Pharmacy has been asked to dose Vancomycin by Dr. Adis Brasher.     She is her.  We appreciate the opportunity to assist in her care.     Kimberly Rasmussen, PharmD  7/10/2018  5:43 PM  [address] [place] [address]: [phone]

## 2018-07-10 NOTE — CONSULTS
INFECTIOUS DISEASE CONSULT NOTE    Sandrine Norris Patient Status:  Inpatient    3/1/1945 MRN [id number]   [facility] 3SW-A Attending Keith Saravia MD   Hosp Day # 0 MARQUISE White ago. She has a 56.00 pack-year smoking history. She has never used smokeless tobacco. She reports that she does not drink alcohol or use drugs.     Allergies:    Pcns [Penicillins]      RASH, SWELLING    Medications:    Current Facility-Administered Medicat murmurs. Abdomen: Soft, nontender, nondistended. Positive bowel sounds. Musculoskeletal: no arthritis  Integument: diffuse erythema of distal stump, with small rounded wound with copious purulent drainage.  Does not seem to extend down to bone, tunnel se recall receiving cephalosporins    PLAN:    -empiric vanco and cristin for now  -check esr and crp  -await wound cx and adjust abx  -await vascular eval but if infection is superficial then she may not require further surgery  -monitor area  Case and plan d/w

## 2018-07-10 NOTE — H&P
[facility]  History and Physical     Braulio Cox Patient Status:  Inpatient    3/1/1945 MRN [id number]   [facility] 3SW-A Attending Diane Rendon MD   Hosp Day # 0 PCP Stephany Warren MD     Chief Complaint: Leg wound drain • Diabetes Mother        Allergies:   Pcns [Penicillins]      RASH, SWELLING    Medications:    No current facility-administered medications on file prior to encounter.    Current Outpatient Prescriptions on File Prior to Encounter:  HYDROcodone-acetamino nightly as needed for Sleep. Disp: 30 tablet Rfl: 0       Review of Systems:   A comprehensive 14 point review of systems was completed. Pertinent positives and negatives noted in the HPI.     Physical Exam:    /75   Pulse 91   Temp 98.6 °F (37 °C) Defer need for MRI to consultants  4. Wound cultures  2. Acute kidney injury  1. IV fluids  3.  Normocytic anemia likely anemia of chronic disease    Quality:  · DVT Prophylaxis: lovenox  · CODE status: full  · Kessler: no    Plan of care discussed with pt, E

## 2018-07-10 NOTE — CONSULTS
[facility] Pharmacy Note:  Renal Adjustment for meropenem (MERREM)    Ellie Ramirez is a 68year old female who has been prescribed meropenem (MERREM) 500 mg every 8 hrs.   CrCl is estimated creatinine clearance is 40 mL/min (A) (based on SCr of 1.19 mg/dL (H

## 2018-07-10 NOTE — ED INITIAL ASSESSMENT (HPI)
Pt s/p ALICIAA in April of this year. States yesterday the visiting nurse noted clear drainage and a foul odor from her wound. Pt denies c/o.   EMS noted BP to be low upon their arrival.

## 2018-07-11 ENCOUNTER — TELEPHONE (OUTPATIENT)
Dept: FAMILY MEDICINE CLINIC | Facility: CLINIC | Age: 73
End: 2018-07-11

## 2018-07-11 PROBLEM — Z89.512 STATUS POST BELOW KNEE AMPUTATION OF LEFT LOWER EXTREMITY: Status: ACTIVE | Noted: 2018-04-27

## 2018-07-11 LAB
BASOPHILS # BLD AUTO: 0.07 X10(3) UL (ref 0–0.1)
BASOPHILS NFR BLD AUTO: 0.9 %
BUN BLD-MCNC: 14 MG/DL (ref 8–20)
CALCIUM BLD-MCNC: 9.8 MG/DL (ref 8.3–10.3)
CHLORIDE: 108 MMOL/L (ref 101–111)
CO2: 27 MMOL/L (ref 22–32)
CREAT BLD-MCNC: 0.87 MG/DL (ref 0.55–1.02)
CREAT BLD-MCNC: 0.87 MG/DL (ref 0.55–1.02)
EOSINOPHIL # BLD AUTO: 0.34 X10(3) UL (ref 0–0.3)
EOSINOPHIL NFR BLD AUTO: 4.6 %
ERYTHROCYTE [DISTWIDTH] IN BLOOD BY AUTOMATED COUNT: 13.7 % (ref 11.5–16)
GLUCOSE BLD-MCNC: 87 MG/DL (ref 70–99)
HCT VFR BLD AUTO: 32.8 % (ref 34–50)
HGB BLD-MCNC: 10.1 G/DL (ref 12–16)
IMMATURE GRANULOCYTE COUNT: 0.02 X10(3) UL (ref 0–1)
IMMATURE GRANULOCYTE RATIO %: 0.3 %
LYMPHOCYTES # BLD AUTO: 1.87 X10(3) UL (ref 0.9–4)
LYMPHOCYTES NFR BLD AUTO: 25.1 %
MCH RBC QN AUTO: 29 PG (ref 27–33.2)
MCHC RBC AUTO-ENTMCNC: 30.8 G/DL (ref 31–37)
MCV RBC AUTO: 94.3 FL (ref 81–100)
MONOCYTES # BLD AUTO: 0.68 X10(3) UL (ref 0.1–1)
MONOCYTES NFR BLD AUTO: 9.1 %
NEUTROPHIL ABS PRELIM: 4.47 X10 (3) UL (ref 1.3–6.7)
NEUTROPHILS # BLD AUTO: 4.47 X10(3) UL (ref 1.3–6.7)
NEUTROPHILS NFR BLD AUTO: 60 %
PLATELET # BLD AUTO: 249 10(3)UL (ref 150–450)
POTASSIUM SERPL-SCNC: 4.5 MMOL/L (ref 3.6–5.1)
RBC # BLD AUTO: 3.48 X10(6)UL (ref 3.8–5.1)
RED CELL DISTRIBUTION WIDTH-SD: 47 FL (ref 35.1–46.3)
SODIUM SERPL-SCNC: 142 MMOL/L (ref 136–144)
WBC # BLD AUTO: 7.5 X10(3) UL (ref 4–13)

## 2018-07-11 PROCEDURE — 99232 SBSQ HOSP IP/OBS MODERATE 35: CPT | Performed by: HOSPITALIST

## 2018-07-11 NOTE — TELEPHONE ENCOUNTER
Patient's daughter called and stated that patient was hospitalized yesterday and her daughter would like to speak with Dr Carrie Costello about this.

## 2018-07-11 NOTE — PROGRESS NOTES
[facility]  Progress Note     Karla Morales Patient Status:  Inpatient    3/1/1945 MRN [id number]   [facility] 3SW-A Attending Otilio Meigs, MD   Hosp Day # 1 PCP Elena Monsalve MD     Chief Complaint: wound infection    S: Pa Oral BID with meals   • gabapentin  400 mg Oral TID   • mirtazapine  7.5 mg Oral Nightly   • enoxaparin  40 mg Subcutaneous Daily   • meropenem  500 mg Intravenous Q12H   • vancomycin  15 mg/kg (Adjusted) Intravenous Q12H       ASSESSMENT / PLAN:     1.  Martha Holliday

## 2018-07-11 NOTE — TELEPHONE ENCOUNTER
Discussed with the patient's daughter Munira Chance about the patient's hospital course  Patient is scheduled for left above-knee amputation  The intention of the surgery is to make the wound heal, with the present circulation if the wound does not heal it might le

## 2018-07-11 NOTE — PROGRESS NOTES
Full consult to follow    Patient will need left above knee amputation. She wants to think about before proceeding. Discussed with her that if revision attempt unsuccessful would need AKA during last admission.

## 2018-07-11 NOTE — OCCUPATIONAL THERAPY NOTE
Attempted to see patient for OT eval this am, however patient adamantly refusion PT/OT services, stating \"I did therapy before and I'll do more therapy when I get home, but I'm not doing any therapy while I'm in the hospital. I'm only here because the doc

## 2018-07-11 NOTE — PHYSICAL THERAPY NOTE
Attempted to see patient for PT evaluation this time. Patient refused PT stating that she had lot of PT @ Rehab facility & now for past 3 weeks she has been receiving Home PT.  She adamantly refused to participate with PT. eMño Faustin was notified & is aware of

## 2018-07-11 NOTE — PROGRESS NOTES
[facility]  TEL: [phone]  FAX: [phone]    Theo Brochure Patient Status:  Inpatient    3/1/1945 MRN [id number]   [facility] 3SW-A Attending Vicki Blanton MD   [facility] Day # 1 PCP Mary Carmen Dejesus, distal stump on x-ray  PATIENT STATED HISTORY: (As transcribed by Technologist)  Left distal stump wound, redness and swelling x2 weeks.     FINDINGS:  Changes of amputation through the proximal diaphysis of the tibia and fibula with well-defined resected b

## 2018-07-12 ENCOUNTER — ANESTHESIA EVENT (OUTPATIENT)
Dept: SURGERY | Facility: HOSPITAL | Age: 73
DRG: 475 | End: 2018-07-12
Payer: MEDICARE

## 2018-07-12 ENCOUNTER — SURGERY (OUTPATIENT)
Age: 73
End: 2018-07-12

## 2018-07-12 ENCOUNTER — ANESTHESIA (OUTPATIENT)
Dept: SURGERY | Facility: HOSPITAL | Age: 73
DRG: 475 | End: 2018-07-12
Payer: MEDICARE

## 2018-07-12 LAB
CREAT BLD-MCNC: 0.86 MG/DL (ref 0.55–1.02)
VANCOMYCIN TROUGH: 15.7 UG/ML (ref 10–20)

## 2018-07-12 PROCEDURE — 99232 SBSQ HOSP IP/OBS MODERATE 35: CPT | Performed by: HOSPITALIST

## 2018-07-12 PROCEDURE — 0Y6D0Z3 DETACHMENT AT LEFT UPPER LEG, LOW, OPEN APPROACH: ICD-10-PCS | Performed by: SURGERY

## 2018-07-12 PROCEDURE — 3E0T3BZ INTRODUCTION OF ANESTHETIC AGENT INTO PERIPHERAL NERVES AND PLEXI, PERCUTANEOUS APPROACH: ICD-10-PCS | Performed by: ANESTHESIOLOGY

## 2018-07-12 RX ORDER — HYDROCODONE BITARTRATE AND ACETAMINOPHEN 10; 325 MG/1; MG/1
1 TABLET ORAL EVERY 6 HOURS PRN
Status: DISCONTINUED | OUTPATIENT
Start: 2018-07-12 | End: 2018-07-16

## 2018-07-12 RX ORDER — DOCUSATE SODIUM 100 MG/1
100 CAPSULE, LIQUID FILLED ORAL 2 TIMES DAILY
Status: DISCONTINUED | OUTPATIENT
Start: 2018-07-12 | End: 2018-07-16

## 2018-07-12 RX ORDER — SODIUM CHLORIDE, SODIUM LACTATE, POTASSIUM CHLORIDE, CALCIUM CHLORIDE 600; 310; 30; 20 MG/100ML; MG/100ML; MG/100ML; MG/100ML
INJECTION, SOLUTION INTRAVENOUS CONTINUOUS
Status: DISCONTINUED | OUTPATIENT
Start: 2018-07-12 | End: 2018-07-12 | Stop reason: HOSPADM

## 2018-07-12 RX ORDER — ONDANSETRON 2 MG/ML
4 INJECTION INTRAMUSCULAR; INTRAVENOUS EVERY 6 HOURS PRN
Status: DISCONTINUED | OUTPATIENT
Start: 2018-07-12 | End: 2018-07-14

## 2018-07-12 RX ORDER — NALBUPHINE HCL 10 MG/ML
2.5 AMPUL (ML) INJECTION EVERY 4 HOURS PRN
Status: DISCONTINUED | OUTPATIENT
Start: 2018-07-12 | End: 2018-07-14

## 2018-07-12 RX ORDER — EPHEDRINE SULFATE 50 MG/ML
INJECTION, SOLUTION INTRAVENOUS
Status: COMPLETED
Start: 2018-07-12 | End: 2018-07-12

## 2018-07-12 RX ORDER — ONDANSETRON 2 MG/ML
4 INJECTION INTRAMUSCULAR; INTRAVENOUS AS NEEDED
Status: DISCONTINUED | OUTPATIENT
Start: 2018-07-12 | End: 2018-07-12 | Stop reason: HOSPADM

## 2018-07-12 RX ORDER — MIDAZOLAM HYDROCHLORIDE 1 MG/ML
1 INJECTION INTRAMUSCULAR; INTRAVENOUS EVERY 5 MIN PRN
Status: COMPLETED | OUTPATIENT
Start: 2018-07-12 | End: 2018-07-12

## 2018-07-12 RX ORDER — MORPHINE SULFATE 4 MG/ML
2 INJECTION, SOLUTION INTRAMUSCULAR; INTRAVENOUS EVERY 5 MIN PRN
Status: DISCONTINUED | OUTPATIENT
Start: 2018-07-12 | End: 2018-07-12 | Stop reason: HOSPADM

## 2018-07-12 RX ORDER — NALOXONE HYDROCHLORIDE 0.4 MG/ML
0.08 INJECTION, SOLUTION INTRAMUSCULAR; INTRAVENOUS; SUBCUTANEOUS
Status: DISCONTINUED | OUTPATIENT
Start: 2018-07-12 | End: 2018-07-14

## 2018-07-12 RX ORDER — HYDROCODONE BITARTRATE AND ACETAMINOPHEN 10; 325 MG/1; MG/1
2 TABLET ORAL EVERY 6 HOURS PRN
Status: DISCONTINUED | OUTPATIENT
Start: 2018-07-12 | End: 2018-07-16

## 2018-07-12 RX ORDER — NALOXONE HYDROCHLORIDE 0.4 MG/ML
80 INJECTION, SOLUTION INTRAMUSCULAR; INTRAVENOUS; SUBCUTANEOUS AS NEEDED
Status: DISCONTINUED | OUTPATIENT
Start: 2018-07-12 | End: 2018-07-12 | Stop reason: HOSPADM

## 2018-07-12 RX ORDER — MIDAZOLAM HYDROCHLORIDE 1 MG/ML
INJECTION INTRAMUSCULAR; INTRAVENOUS
Status: COMPLETED
Start: 2018-07-12 | End: 2018-07-12

## 2018-07-12 RX ORDER — HYDROCODONE BITARTRATE AND ACETAMINOPHEN 5; 325 MG/1; MG/1
1 TABLET ORAL AS NEEDED
Status: DISCONTINUED | OUTPATIENT
Start: 2018-07-12 | End: 2018-07-12 | Stop reason: HOSPADM

## 2018-07-12 RX ORDER — METOCLOPRAMIDE HYDROCHLORIDE 5 MG/ML
10 INJECTION INTRAMUSCULAR; INTRAVENOUS AS NEEDED
Status: DISCONTINUED | OUTPATIENT
Start: 2018-07-12 | End: 2018-07-12 | Stop reason: HOSPADM

## 2018-07-12 RX ORDER — DIPHENHYDRAMINE HYDROCHLORIDE 50 MG/ML
12.5 INJECTION INTRAMUSCULAR; INTRAVENOUS EVERY 4 HOURS PRN
Status: DISCONTINUED | OUTPATIENT
Start: 2018-07-12 | End: 2018-07-14

## 2018-07-12 RX ORDER — HYDROCODONE BITARTRATE AND ACETAMINOPHEN 5; 325 MG/1; MG/1
2 TABLET ORAL AS NEEDED
Status: DISCONTINUED | OUTPATIENT
Start: 2018-07-12 | End: 2018-07-12 | Stop reason: HOSPADM

## 2018-07-12 RX ORDER — EPHEDRINE SULFATE 50 MG/ML
10 INJECTION, SOLUTION INTRAVENOUS AS NEEDED
Status: DISCONTINUED | OUTPATIENT
Start: 2018-07-12 | End: 2018-07-16

## 2018-07-12 RX ORDER — MORPHINE SULFATE 4 MG/ML
INJECTION, SOLUTION INTRAMUSCULAR; INTRAVENOUS
Status: COMPLETED
Start: 2018-07-12 | End: 2018-07-12

## 2018-07-12 RX ORDER — MORPHINE SULFATE 4 MG/ML
2 INJECTION, SOLUTION INTRAMUSCULAR; INTRAVENOUS EVERY 30 MIN PRN
Status: DISCONTINUED | OUTPATIENT
Start: 2018-07-12 | End: 2018-07-14

## 2018-07-12 RX ORDER — DEXAMETHASONE SODIUM PHOSPHATE 4 MG/ML
4 VIAL (ML) INJECTION AS NEEDED
Status: DISCONTINUED | OUTPATIENT
Start: 2018-07-12 | End: 2018-07-12 | Stop reason: HOSPADM

## 2018-07-12 NOTE — PLAN OF CARE
Iv site painful, iv removed. Another RN attempted to restart iv three times, unsuccessful. Patient refuses to have iv restarted, states \"I want to be asleep then they can do it\".   Patient also had questions for physician, declined to sign consent at th

## 2018-07-12 NOTE — PROGRESS NOTES
[facility]  Progress Note     Anna Marino Patient Status:  Inpatient    3/1/1945 MRN [id number]   [facility] 3SW-A Attending Jacqueline Bear MD   Hosp Day # 2 PCP July Chan MD     Chief Complaint: wound infection    S: Pa Daily   • DULoxetine HCl  60 mg Oral Daily   • ferrous sulfate  325 mg Oral BID with meals   • gabapentin  400 mg Oral TID   • mirtazapine  7.5 mg Oral Nightly   • enoxaparin  40 mg Subcutaneous Daily   • vancomycin  15 mg/kg (Adjusted) Intravenous Q12H

## 2018-07-12 NOTE — PLAN OF CARE
Received call from lab with blood culture results. Anerobic bottle showed gram + rods. Dr Tunde Hemphill notified. No new orders received.

## 2018-07-12 NOTE — PROGRESS NOTES
[facility] dosing service    Follow-up Pharmacokinetic Consult for Vancomycin Dosing     Renee Velez is a 68year old female who is being treated for cellulitis. Patient is on day 3 of Vancomycin 1 gm IV Q 12 hours. Goal trough is 10-15 ug/mL. Blood Culture Result No Growth 1 Day N/A   Blood Culture Smear Gram Positive Rods N/A   Blood Culture Smear No Target DNA detected by PCR in specimen with mixed gram stain N/A       Based on the above:    1.  Continue Vancomycin at 1 gm IVPB Q 12 hours (b

## 2018-07-12 NOTE — BRIEF OP NOTE
Pre-Operative Diagnosis: below knee amputation infection left leg     Post-Operative Diagnosis: same     Procedure Performed:   Procedure(s):  LEFT ABOVE KNEE AMPUTATION    Surgeon(s) and Role:     Erica Dupree MD - Primary    Assistant(s):        Zack Valdivia

## 2018-07-12 NOTE — ANESTHESIA POSTPROCEDURE EVALUATION
[facility] Patient Status:  Inpatient   Age/Gender 68year old female MRN [id number]   Location [facility] Attending Jayson Cardoza MD   [facility] Day # 2 PCP Ellen Estrada MD       Anesthesia Post-op No

## 2018-07-12 NOTE — PROGRESS NOTES
[facility]  TEL: [phone]  FAX: [phone]    Ny Marin Patient Status:  Inpatient    3/1/1945 MRN [id number]   [facility] 3SW-A Attending Faina Mac MD   [facility] Day [facility] PCP Juan Nunn, ([id number]=[id number])    Result Date: 7/10/2018  PROCEDURE:  XR KNEE (1 OR 2 VIEWS), LEFT (CPT=73560)  COMPARISON:  None.   INDICATIONS:  Discharge from stump please include distal stump on x-ray  PATIENT STATED HISTORY: (As transcribed by Technologist)  Left distal

## 2018-07-12 NOTE — ANESTHESIA PREPROCEDURE EVALUATION
PRE-OP EVALUATION    Patient Name: Jonny Bhardwaj    Pre-op Diagnosis: INPATIENT    Procedure(s):  LEFT ABOVE KNEE AMPUTATION    Surgeon(s) and Role:     Magdi Colon MD - Primary    Pre-op vitals reviewed.   Temp: 98.8 °F (37.1 °C)  Pulse: 79  Re Cardiovascular                       (+) CAD                                Endo/Other  Comment: SLE         (+) hypothyroidism                       Pulmonary        (+) COPD            (+) sleep apnea       Neuro/Psych      (+) depression Admission:  **None**

## 2018-07-13 LAB
ATRIAL RATE: 97 BPM
CREAT BLD-MCNC: 0.77 MG/DL (ref 0.55–1.02)
P AXIS: 41 DEGREES
P-R INTERVAL: 164 MS
Q-T INTERVAL: 362 MS
QRS DURATION: 82 MS
QTC CALCULATION (BEZET): 459 MS
R AXIS: 50 DEGREES
T AXIS: 36 DEGREES
VENTRICULAR RATE: 97 BPM

## 2018-07-13 PROCEDURE — 99233 SBSQ HOSP IP/OBS HIGH 50: CPT | Performed by: HOSPITALIST

## 2018-07-13 RX ORDER — MORPHINE SULFATE 4 MG/ML
2 INJECTION, SOLUTION INTRAMUSCULAR; INTRAVENOUS ONCE
Status: DISCONTINUED | OUTPATIENT
Start: 2018-07-13 | End: 2018-07-13

## 2018-07-13 NOTE — OPERATIVE REPORT
[facility]    PATIENT'S NAME: Santa Brian   ATTENDING PHYSICIAN: Ivy Mora M.D. OPERATING PHYSICIAN: Ivy Mora M.D.    PATIENT ACCOUNT#:   [de-identified]    LOCATION:  3SW-A [facility]  MEDICAL RECORD #:   [id number]       DATE OF the wound for hemostasis. I also transected the sciatic nerve so that it would not be in the field, and it retracted into the amputation site. There was no bleeding from the sciatic artery. I began by closing the incision.   I closed the incision by clos

## 2018-07-13 NOTE — CONSULTS
[facility] Pharmacy Note:  Pain Consult    Syl Linares is a 68year old female started on Morphine PCA by Dr. Shahab Tesfaye. Pharmacy was consulted to review medication profile and to discontinue previously ordered narcotics and sedatives.     Medication profil

## 2018-07-13 NOTE — OCCUPATIONAL THERAPY NOTE
OCCUPATIONAL THERAPY EVALUATION - INPATIENT     Room Number: 359/359-A  Evaluation Date: 7/13/2018  Type of Evaluation: Initial  Presenting Problem: s/p L AKA 7/12/18    Physician Order: IP Consult to Occupational Therapy  Reason for Therapy: ADL/IADL Dysf Alone (recently was d/c to home with home PT.)    Toilet and Equipment: Comfort height toilet (having grab bars installed)  Shower/Tub and Equipment: Tub-shower combo; Tub transfer bench;Grab bar  Other Equipment: None    Occupation/Status: Retired     [employer] regular upper body clothing?: A Little (setup)  -   Taking care of personal grooming such as brushing teeth?: A Little (setup)  -   Eating meals?: A Little (setup)    AM-PAC Score:  Score: 16  Approx Degree of Impairment: 53.32%  Standardized Score (AM-PAC history and occupational profile noted above. Functional outcome measures completed include: AM-PAC \"6 Clicks\".  In this OT evaluation patient presents with the following performance deficits: increased pain, decreased balance, decreased endurance, decrea History MODERATE - Expanded review of history including review of medical or therapy record   Specific performance deficits impacting engagement in ADL/IADL MODERATE  3 - 5 performance deficits   Client Assessment/Performance Deficits MODERATE - Comorbidit

## 2018-07-13 NOTE — PROGRESS NOTES
[address] [place] [address]  TEL: [phone]  FAX: [phone]    Jay Oliva Patient Status:  Inpatient    3/1/1945 MRN [id number]   [facility] 3SW-A Attending Remington Milligan MD   [facility] Day # 3 PCP Lydia Precise, ([id number])    Result Date: 7/10/2018  PROCEDURE:  XR KNEE (1 OR 2 VIEWS), LEFT (CPT=73560)  COMPARISON:  None.   INDICATIONS:  Discharge from stump please include distal stump on x-ray  PATIENT STATED HISTORY: (As transcribed by Technologist)  Left distal

## 2018-07-13 NOTE — PHYSICAL THERAPY NOTE
PHYSICAL THERAPY EVALUATION - INPATIENT     Room Number: 359/359-A  Evaluation Date: 7/13/2018  Type of Evaluation: Initial  Physician Order: PT Eval and Treat    Presenting Problem: S/p Left AKA 07/12/18  Reason for Therapy: Mobility Dysfunction and (O2 @ night)    Prior Level of [place]: Per patient she was able to perform stand pivot transfer to wheel chair. Was able to perform most of ADLs independently. No recent falls reported. Has elevator in building.  Daughter,Son in law & grand daughter stop and blankets)?: A Little   -   Sitting down on and standing up from a chair with arms (e.g., wheelchair, bedside commode, etc.): A Lot   -   Moving from lying on back to sitting on the side of the bed?: A Little   How much help from another person does the site, decreased balance,decreased endurance & moderate deconditioning. Functional outcome measures completed include AM PAC Scores.   Based on this evaluation, patient's clinical presentation is stable and overall the evaluation complexity is considered mo

## 2018-07-13 NOTE — CM/SW NOTE
07/13/18 1500   CM/SW Referral Data   Referral Source Physician   Reason for Referral Discharge planning   Informant Patient;Edward Staff   Pertinent Medical Hx   Primary Care Physician Name Noemí Palencia MD   Social History   Suicidal Ideation/Depression/Men she is able to d/c directly home. DENYS noted that this is likely not an option but will see how PT/OT goes tomorrow. SW following.   SW did ask SS screener to check on DON screening date- expect this to be current since pt is on the CCP with SS.

## 2018-07-13 NOTE — PROGRESS NOTES
[facility]  Progress Note     Braulio Cox Patient Status:  Inpatient    3/1/1945 MRN [id number]   [facility] 3SW-A Attending Diane Rendon MD   Hosp Day # 3 PCP Stephany Warren MD     Chief Complaint: wound infection    S: s/ 103  108   --    --    CO2  27.0  27.0   --    --    ALKPHO  77   --    --    --    AST  13*   --    --    --    ALT  14   --    --    --    BILT  0.3   --    --    --    TP  7.1   --    --    --        Estimated Creatinine Clearance: 61.8 mL/min (based on

## 2018-07-14 LAB
ANTIBODY SCREEN: NEGATIVE
BASOPHILS # BLD AUTO: 0.05 X10(3) UL (ref 0–0.1)
BASOPHILS NFR BLD AUTO: 0.6 %
BUN BLD-MCNC: 9 MG/DL (ref 8–20)
CALCIUM BLD-MCNC: 9 MG/DL (ref 8.3–10.3)
CHLORIDE: 108 MMOL/L (ref 101–111)
CO2: 30 MMOL/L (ref 22–32)
CREAT BLD-MCNC: 0.71 MG/DL (ref 0.55–1.02)
EOSINOPHIL # BLD AUTO: 0.19 X10(3) UL (ref 0–0.3)
EOSINOPHIL NFR BLD AUTO: 2.2 %
ERYTHROCYTE [DISTWIDTH] IN BLOOD BY AUTOMATED COUNT: 13.3 % (ref 11.5–16)
GLUCOSE BLD-MCNC: 103 MG/DL (ref 70–99)
HCT VFR BLD AUTO: 22.8 % (ref 34–50)
HGB BLD-MCNC: 6.7 G/DL (ref 12–16)
HGB BLD-MCNC: 7 G/DL (ref 12–16)
IMMATURE GRANULOCYTE COUNT: 0.04 X10(3) UL (ref 0–1)
IMMATURE GRANULOCYTE RATIO %: 0.5 %
LYMPHOCYTES # BLD AUTO: 1.5 X10(3) UL (ref 0.9–4)
LYMPHOCYTES NFR BLD AUTO: 17.7 %
MCH RBC QN AUTO: 28.9 PG (ref 27–33.2)
MCHC RBC AUTO-ENTMCNC: 30.7 G/DL (ref 31–37)
MCV RBC AUTO: 94.2 FL (ref 81–100)
MONOCYTES # BLD AUTO: 0.85 X10(3) UL (ref 0.1–1)
MONOCYTES NFR BLD AUTO: 10 %
NEUTROPHIL ABS PRELIM: 5.85 X10 (3) UL (ref 1.3–6.7)
NEUTROPHILS # BLD AUTO: 5.85 X10(3) UL (ref 1.3–6.7)
NEUTROPHILS NFR BLD AUTO: 69 %
PLATELET # BLD AUTO: 247 10(3)UL (ref 150–450)
POTASSIUM SERPL-SCNC: 4.6 MMOL/L (ref 3.6–5.1)
RBC # BLD AUTO: 2.42 X10(6)UL (ref 3.8–5.1)
RED CELL DISTRIBUTION WIDTH-SD: 45.6 FL (ref 35.1–46.3)
RH BLOOD TYPE: NEGATIVE
SODIUM SERPL-SCNC: 143 MMOL/L (ref 136–144)
WBC # BLD AUTO: 8.5 X10(3) UL (ref 4–13)

## 2018-07-14 PROCEDURE — 99232 SBSQ HOSP IP/OBS MODERATE 35: CPT | Performed by: HOSPITALIST

## 2018-07-14 PROCEDURE — 30233N1 TRANSFUSION OF NONAUTOLOGOUS RED BLOOD CELLS INTO PERIPHERAL VEIN, PERCUTANEOUS APPROACH: ICD-10-PCS | Performed by: SURGERY

## 2018-07-14 RX ORDER — MORPHINE SULFATE 4 MG/ML
4 INJECTION, SOLUTION INTRAMUSCULAR; INTRAVENOUS
Status: DISCONTINUED | OUTPATIENT
Start: 2018-07-14 | End: 2018-07-16

## 2018-07-14 RX ORDER — SODIUM CHLORIDE 9 MG/ML
INJECTION, SOLUTION INTRAVENOUS ONCE
Status: DISCONTINUED | OUTPATIENT
Start: 2018-07-14 | End: 2018-07-16

## 2018-07-14 RX ORDER — MORPHINE SULFATE 4 MG/ML
8 INJECTION, SOLUTION INTRAMUSCULAR; INTRAVENOUS
Status: DISCONTINUED | OUTPATIENT
Start: 2018-07-14 | End: 2018-07-16

## 2018-07-14 RX ORDER — LORAZEPAM 0.5 MG/1
0.5 TABLET ORAL EVERY 6 HOURS PRN
Status: DISCONTINUED | OUTPATIENT
Start: 2018-07-14 | End: 2018-07-16

## 2018-07-14 RX ORDER — MORPHINE SULFATE 4 MG/ML
2 INJECTION, SOLUTION INTRAMUSCULAR; INTRAVENOUS
Status: DISCONTINUED | OUTPATIENT
Start: 2018-07-14 | End: 2018-07-16

## 2018-07-14 NOTE — PLAN OF CARE
Impaired Activities of Daily Living    • Achieve highest/safest level of independence in self care Progressing        Impaired Functional Mobility    • Achieve highest/safest level of mobility/gait Progressing        PAIN - ADULT    • Verbalizes/displays a placed in the chart

## 2018-07-14 NOTE — PHYSICAL THERAPY NOTE
PHYSICAL THERAPY TREATMENT NOTE - INPATIENT    Room Number: 015/514-H     Session: 1/5   Number of Visits to Meet Established Goals: 5    Presenting Problem: S/p Left AKA 07/12/18     History related to current admission: Patient had undergone Left BKA & mechanics; Activity promotion;Repositioning    BALANCE                                                                                                                     Static Sitting: Fair +  Dynamic Sitting: Fair           Static Standing: Not tested  D using RW stating she does not do this at home, therefore refuses despite rationale for strengthening therex for RLE, along with engaging core and working on balance. RN made aware.      THERAPEUTIC EXERCISES  Lower Extremity Alternating marching  Ankle pump

## 2018-07-14 NOTE — PROGRESS NOTES
MONE HOSPITALIST  Progress Note     Tariq Bianchi Patient Status:  Inpatient    3/1/1945 MRN [id number]   [facility] 3SW-A Attending Gerald Kulkarni MD   [address] Day # 4 PCP Kathie Aponte MD     Chief Complaint: wound infection    S: s/ 2.6*   --    --    --    --    NA  137  142   --    --   143   K  4.7  4.5   --    --   4.6   CL  103  108   --    --   108   CO2  27.0  27.0   --    --   30.0   ALKPHO  77   --    --    --    --    AST  13*   --    --    --    --    ALT  14   --    --

## 2018-07-15 LAB
BASOPHILS # BLD AUTO: 0.07 X10(3) UL (ref 0–0.1)
BASOPHILS NFR BLD AUTO: 0.9 %
EOSINOPHIL # BLD AUTO: 0.37 X10(3) UL (ref 0–0.3)
EOSINOPHIL NFR BLD AUTO: 4.6 %
ERYTHROCYTE [DISTWIDTH] IN BLOOD BY AUTOMATED COUNT: 13.9 % (ref 11.5–16)
HCT VFR BLD AUTO: 25.2 % (ref 34–50)
HGB BLD-MCNC: 7.8 G/DL (ref 12–16)
IMMATURE GRANULOCYTE COUNT: 0.03 X10(3) UL (ref 0–1)
IMMATURE GRANULOCYTE RATIO %: 0.4 %
LYMPHOCYTES # BLD AUTO: 1.96 X10(3) UL (ref 0.9–4)
LYMPHOCYTES NFR BLD AUTO: 24.3 %
MCH RBC QN AUTO: 28.9 PG (ref 27–33.2)
MCHC RBC AUTO-ENTMCNC: 31 G/DL (ref 31–37)
MCV RBC AUTO: 93.3 FL (ref 81–100)
MONOCYTES # BLD AUTO: 0.7 X10(3) UL (ref 0.1–1)
MONOCYTES NFR BLD AUTO: 8.7 %
NEUTROPHIL ABS PRELIM: 4.92 X10 (3) UL (ref 1.3–6.7)
NEUTROPHILS # BLD AUTO: 4.92 X10(3) UL (ref 1.3–6.7)
NEUTROPHILS NFR BLD AUTO: 61.1 %
PLATELET # BLD AUTO: 263 10(3)UL (ref 150–450)
RBC # BLD AUTO: 2.7 X10(6)UL (ref 3.8–5.1)
RED CELL DISTRIBUTION WIDTH-SD: 46.9 FL (ref 35.1–46.3)
WBC # BLD AUTO: 8.1 X10(3) UL (ref 4–13)

## 2018-07-15 PROCEDURE — 99232 SBSQ HOSP IP/OBS MODERATE 35: CPT | Performed by: HOSPITALIST

## 2018-07-15 NOTE — PROGRESS NOTES
MONE HOSPITALIST  Progress Note     Theo Brochure Patient Status:  Inpatient    3/1/1945 MRN [id number]   [facility] 3SW-A Attending Vicki Blanton MD   [address] Day # 5 PCP Mary Carmen Dejesus MD     Chief Complaint: wound infection    S: s/ 89  98   GFRNAA  46*  67  67  68  77  85   CA  10.0  9.8   --    --   9.0   ALB  2.6*   --    --    --    --    NA  137  142   --    --   143   K  4.7  4.5   --    --   4.6   CL  103  108   --    --   108   CO2  27.0  27.0   --    --   30.0   ALKPHO  77 recommendations of DANICA and risks involved in going home instead which she understands.      Shayy Lacy MD

## 2018-07-15 NOTE — OCCUPATIONAL THERAPY NOTE
OCCUPATIONAL THERAPY TREATMENT NOTE - INPATIENT     Room Number: 359/359-A  Session: 1   Number of Visits to Meet Established Goals: 5    Presenting Problem: s/p L AKA 7/12/18    History related to current admission: Patient is 68year old female admitted BEARING RESTRICTION  Weight Bearing Restriction: L lower extremity           L Lower Extremity: Non-Weight Bearing    PAIN ASSESSMENT  Ratin  Location: left stump  Management Techniques: Relaxation     ACTIVITY TOLERANCE  Room Air  No c/o sob    ACTIVI Patient has appropriate equipment and set-up at home. Patient adamantly declining DANICA. Patient will benefit from skilled therapy in the home setting. Patient's daughter reports she will be around more to assist patient when d/c'd home.     OT Discharge R

## 2018-07-15 NOTE — PHYSICAL THERAPY NOTE
PHYSICAL THERAPY TREATMENT NOTE - INPATIENT    Room Number: 276/130-I     Session: 2/5   Number of Visits to Meet Established Goals: 5    Presenting Problem: S/p Left AKA 07/12/18     History related to current admission: Patient had undergone Left BKA & Body mechanics; Activity promotion;Repositioning    BALANCE                                                                                                                     Static Sitting: Fair +  Dynamic Sitting: Fair           Static Standing: Not test be part of community activities/events. Pt was encouraged to perform lateral transfer to drop-arm commode or w/c - Pt continues to refuse, stating \"I don't need to prove this, I can do it no problem\".   Pt appreciative of PT/OT session - will f/u arnol demonstrate transfers EOB to/from Chair/Wheelchair at assistance level: supervision   Goal #3 Assess w/c mobility as appropriate   Goal #4     Goal #5     Goal #6     Goal Comments: Goals established on 7/13/2018-all in progress 7/14/18

## 2018-07-15 NOTE — PROGRESS NOTES
BT stopped, IV infiltrated. IV will be restarted by another RN since patient is a very Kenzie Colleton. Charge RN notified.  Endorsed to night shift RN

## 2018-07-16 VITALS
BODY MASS INDEX: 29.73 KG/M2 | SYSTOLIC BLOOD PRESSURE: 117 MMHG | DIASTOLIC BLOOD PRESSURE: 57 MMHG | WEIGHT: 185 LBS | TEMPERATURE: 98 F | HEART RATE: 89 BPM | HEIGHT: 66 IN | RESPIRATION RATE: 16 BRPM | OXYGEN SATURATION: 94 %

## 2018-07-16 LAB
BLOOD TYPE BARCODE: 600
BUN BLD-MCNC: 9 MG/DL (ref 8–20)
CALCIUM BLD-MCNC: 9.2 MG/DL (ref 8.3–10.3)
CHLORIDE: 105 MMOL/L (ref 101–111)
CO2: 31 MMOL/L (ref 22–32)
CREAT BLD-MCNC: 0.68 MG/DL (ref 0.55–1.02)
ERYTHROCYTE [DISTWIDTH] IN BLOOD BY AUTOMATED COUNT: 13.6 % (ref 11.5–16)
GLUCOSE BLD-MCNC: 118 MG/DL (ref 70–99)
HCT VFR BLD AUTO: 26.2 % (ref 34–50)
HGB BLD-MCNC: 8 G/DL (ref 12–16)
MCH RBC QN AUTO: 28.7 PG (ref 27–33.2)
MCHC RBC AUTO-ENTMCNC: 30.5 G/DL (ref 31–37)
MCV RBC AUTO: 93.9 FL (ref 81–100)
PLATELET # BLD AUTO: 302 10(3)UL (ref 150–450)
POTASSIUM SERPL-SCNC: 4.2 MMOL/L (ref 3.6–5.1)
RBC # BLD AUTO: 2.79 X10(6)UL (ref 3.8–5.1)
RED CELL DISTRIBUTION WIDTH-SD: 47.3 FL (ref 35.1–46.3)
SODIUM SERPL-SCNC: 142 MMOL/L (ref 136–144)
WBC # BLD AUTO: 6.3 X10(3) UL (ref 4–13)

## 2018-07-16 PROCEDURE — 99231 SBSQ HOSP IP/OBS SF/LOW 25: CPT | Performed by: HOSPITALIST

## 2018-07-16 RX ORDER — HYDROCODONE BITARTRATE AND ACETAMINOPHEN 10; 325 MG/1; MG/1
1 TABLET ORAL EVERY 4 HOURS PRN
Qty: 60 TABLET | Refills: 0 | Status: SHIPPED | OUTPATIENT
Start: 2018-07-16 | End: 2018-08-27 | Stop reason: ALTCHOICE

## 2018-07-16 NOTE — PROGRESS NOTES
Patient met criteria for discharge. She insisted on being discharged home with home health. She is going by ambulance. All discharge instructions were discussed with her. She verbalized understanding. Norco script was filled in by outpatient pharmacy.

## 2018-07-16 NOTE — PROGRESS NOTES
Patient insists on going home  I consider this a bad idea but respect her decision  Will await home health and confirmation that ambulace can get her up stairs  Wound vac removed - minimal drainage    Can be discharged   Follow up 2 weeks

## 2018-07-16 NOTE — PHYSICAL THERAPY NOTE
PHYSICAL THERAPY TREATMENT NOTE - INPATIENT    Room Number: 775/548-J     Session: 3/5   Number of Visits to Meet Established Goals: 5    Presenting Problem: S/p Left AKA 07/12/18     History related to current admission: Patient had undergone Left BKA & promotion;Repositioning    BALANCE                                                                                                                    Static Sitting: Fair +  Dynamic Sitting: Fair           Static Standing: Not tested  Dynamic Standing: Not Alternating marching  Ankle pumps  Knee extension     Upper Extremity n/a     Position Sitting     Repetitions   10   Sets   1     Patient End of Session: Up in chair;Needs met;Call light within reach;RN aware of session/findings; All patient questions and

## 2018-07-16 NOTE — CM/SW NOTE
Informed by OT that pt cleared for home d/c plan with HHPT. The elevator in her building is not working, so she will need transport home. OT also notes that pt's dtr will be staying with her. SW met with pt re: above.   She notes that it will take 2 wk

## 2018-07-16 NOTE — PROGRESS NOTES
[address] [facility]  TEL: [phone]  FAX: [phone]    Silvino Purvis Patient Status:  Inpatient    3/1/1945 MRN [id number]   [facility] 3SW-A Attending Yuliana Chowdhury MD   [facility] Day # 6 PCP Nahid Barber, 4.5   --    --   4.6  4.2   CL  103  108   --    --   108  105   CO2  27.0  27.0   --    --   30.0  31.0   ALKPHO  77   --    --    --    --    --    AST  13*   --    --    --    --    --    ALT  14   --    --    --    --    --    BILT  0.3   --    --    -

## 2018-07-16 NOTE — OCCUPATIONAL THERAPY NOTE
OCCUPATIONAL THERAPY TREATMENT NOTE - INPATIENT     Room Number: 310/849-R  Session:2   Number of Visits to Meet Established Goals: 5    Presenting Problem: s/p L AKA 7/12/18    History related to current admission: Patient is 68year old female admitted o alarm;Drain(s)    WEIGHT BEARING RESTRICTION  Weight Bearing Restriction: L lower extremity           L Lower Extremity: Non-Weight Bearing    PAIN ASSESSMENT  Ratin  Location: left hip  Management Techniques: Repositioning     ACTIVITY TOLERANCE  Room therapy at home and supervision initially when she is discharged home. Patient in agreement. Patient End of Session: In bed;Needs met;Call light within reach;RN aware of session/findings; All patient questions and concerns addressed;SCDs in place    ASSES

## 2018-07-17 NOTE — PROGRESS NOTES
MONE HOSPITALIST  Progress Note     Nicola Service Patient Status:  Inpatient    3/1/1945 MRN [id number]   [facility] 3SW-A Attending Chhaya Cole MD   [facility] Day # 6 PCP Celestino Amor MD     Chief Complaint: wound infection    S: s/ 46*  67  67   < >  77  85  88   CA  10.0  9.8   --    --   9.0  9.2   ALB  2.6*   --    --    --    --    --    NA  137  142   --    --   143  142   K  4.7  4.5   --    --   4.6  4.2   CL  103  108   --    --   108  105   CO2  27.0  27.0   --    --   30.0

## 2018-07-18 ENCOUNTER — PATIENT OUTREACH (OUTPATIENT)
Dept: CASE MANAGEMENT | Age: 73
End: 2018-07-18

## 2018-07-18 DIAGNOSIS — T14.8XXA WOUND INFECTION: ICD-10-CM

## 2018-07-18 DIAGNOSIS — L08.9 WOUND INFECTION: ICD-10-CM

## 2018-07-18 DIAGNOSIS — T87.40 AMPUTATION STUMP INFECTION (HCC): ICD-10-CM

## 2018-07-18 DIAGNOSIS — Z89.512 STATUS POST BELOW KNEE AMPUTATION OF LEFT LOWER EXTREMITY: ICD-10-CM

## 2018-07-18 DIAGNOSIS — Z89.612 STATUS POST ABOVE KNEE AMPUTATION OF LEFT LOWER EXTREMITY: ICD-10-CM

## 2018-07-18 NOTE — PROGRESS NOTES
Initial Post Discharge Follow Up   Discharge Date: 7/16/18  Contact Date: 7/18/2018    Consent Verification:  Assessment Completed With: Patient  HIPAA Verified?   Yes    Discharge Dx:   Left lower extremity wound infection with possible cellulitis/osteo

## 2018-07-18 NOTE — PROGRESS NOTES
Initial Post Discharge Follow Up   Discharge Date: 7/16/18  Contact Date: 7/18/2018    Consent Verification:  Assessment Completed With: Patient  HIPAA Verified? Yes    Discharge Dx:     Wound infection to left below knee stump; S/P left above the knee  MG Oral Tab Take 1 tablet by mouth every 4 (four) hours as needed. Disp: 60 tablet Rfl: 0   Cyanocobalamin (B-12) 1000 MCG Oral Cap Take 1 capsule by mouth daily.  Disp: 30 capsule Rfl: 3   DULoxetine HCl 30 MG Oral Cap DR Particles Take 2 capsules ( reconciliation at [facility]. • Are there any reasons that keep you from taking your medication as prescribed? No  Are you having any concerns with constipation? No    Referrals/orders at D/C:  Home Health ordered at D/C? Yes   Has  been set up?   Yes   If thereby distorting the mammogram.  Wear a two piece outfit the day of the exam. This allows you to be more comfortable during the exam.  There are no eating or activity restrictions for this exam.  The estimated duration of this exam could take up to 2 divine

## 2018-07-26 ENCOUNTER — TELEPHONE (OUTPATIENT)
Dept: FAMILY MEDICINE CLINIC | Facility: CLINIC | Age: 73
End: 2018-07-26

## 2018-07-26 NOTE — TELEPHONE ENCOUNTER
Tiffany from [facility] called to inform Dr that this weeks social work eval won't happen due to Pt having a conflict. Will be moved to next week. She will fax over new orders.

## 2018-08-08 ENCOUNTER — TELEPHONE (OUTPATIENT)
Dept: FAMILY MEDICINE CLINIC | Facility: CLINIC | Age: 73
End: 2018-08-08

## 2018-08-08 NOTE — TELEPHONE ENCOUNTER
Cony Milner, [facility] Nurse from [facility]. [facility] to advise her it is OK to continue [address] Fax number given if a signed order is required.

## 2018-08-10 ENCOUNTER — HOSPITAL ENCOUNTER (OUTPATIENT)
Dept: MAMMOGRAPHY | Facility: HOSPITAL | Age: 73
Discharge: HOME | End: 2018-08-10
Attending: FAMILY MEDICINE
Payer: MEDICARE

## 2018-08-10 ENCOUNTER — TELEPHONE (OUTPATIENT)
Dept: FAMILY MEDICINE CLINIC | Facility: CLINIC | Age: 73
End: 2018-08-10

## 2018-08-10 DIAGNOSIS — R93.89 ABNORMAL CT SCAN, CHEST: ICD-10-CM

## 2018-08-10 DIAGNOSIS — N63.20 BREAST MASS, LEFT: ICD-10-CM

## 2018-08-10 PROCEDURE — 77062 BREAST TOMOSYNTHESIS BI: CPT | Performed by: FAMILY MEDICINE

## 2018-08-10 PROCEDURE — 76642 ULTRASOUND BREAST LIMITED: CPT | Performed by: FAMILY MEDICINE

## 2018-08-10 PROCEDURE — 77066 DX MAMMO INCL CAD BI: CPT | Performed by: FAMILY MEDICINE

## 2018-08-10 NOTE — IMAGING NOTE
Asssisted Dr. Jenelle Kaur with recommendation for a left US breast biopsy for mass. Emotional and educational support provided. Written information provided to Delano Rizo. Pt has new ASA therapy per Dr. Cathy Rae.  Awaiting plan recommendation of ASA in relat

## 2018-08-10 NOTE — TELEPHONE ENCOUNTER
Dr Gautam Cervantes, Please advise    Received call from Dr. Brook Butcher from Radiology stating patient's [address] of left breast today is highly suspicious, axilla appears OK. Recommends biopsy.     Roselia Said, Breast Care Coordinator, called requesting order to hold ASA 81mg

## 2018-08-13 ENCOUNTER — TELEPHONE (OUTPATIENT)
Dept: FAMILY MEDICINE CLINIC | Facility: CLINIC | Age: 73
End: 2018-08-13

## 2018-08-13 ENCOUNTER — TELEPHONE (OUTPATIENT)
Dept: MAMMOGRAPHY | Facility: HOSPITAL | Age: 73
End: 2018-08-13

## 2018-08-13 DIAGNOSIS — R92.8 ABNORMAL MAMMOGRAM OF LEFT BREAST: Primary | ICD-10-CM

## 2018-08-13 RX ORDER — DIAZEPAM 5 MG/1
5 TABLET ORAL SEE ADMIN INSTRUCTIONS
Qty: 2 TABLET | Refills: 0 | Status: SHIPPED
Start: 2018-08-13 | End: 2018-09-25 | Stop reason: ALTCHOICE

## 2018-08-13 NOTE — TELEPHONE ENCOUNTER
Spoke with Breast Care Coordinator and Mammography. Order placed for ultrasound guided biopsy of left breast. Order placed for diazepam 5mg night before procedure and 5mg morning of procedure.

## 2018-08-13 NOTE — TELEPHONE ENCOUNTER
Ratna Turcios at [facility] called. We need to get Referral for Mammo/Biopsy/signed order ASAP. Daughter is calling to schedule biopsy. Mammo at [facility] would like Biopsy done very soon - patient high risk.

## 2018-08-13 NOTE — TELEPHONE ENCOUNTER
Patient's daughter called on cell phone number. States her mother's procedure has not been scheduled yet. Jm Serrano are waiting for a referral for procedure. \"    Call placed to Breast Care Coordinator, Sami Ryan, to inquire on status of referral and to rashid

## 2018-08-13 NOTE — TELEPHONE ENCOUNTER
Patient's daughter called to say that she spoke with someone regarding pt's procedure and they suggested that she call pt's PCP to request valium for patient for the procedure.

## 2018-08-13 NOTE — TELEPHONE ENCOUNTER
ML on Breast Care coordinator line at [phone]. Informed per Dr. Korin Kennedy it is OK to hold aspirin 3 days prior to breast biopsy and for 2 days post procedure.

## 2018-08-13 NOTE — TELEPHONE ENCOUNTER
Rec'd call from Dr. Patric Olguin staff, Ina stern ASA therapy management for [facility]. Plan: OK to hold ASA 3 days before and 2 days after breast bx. Notified pt/daughter Debra Marcial of plan.

## 2018-08-13 NOTE — TELEPHONE ENCOUNTER
F/U call to Dr. Jong Rivera re management of ASA in r/t breast bx recommendation. Spoke with Fausto Berger. Awaiting call back with Dr. Osmin Vides recommendation.

## 2018-08-16 ENCOUNTER — TELEPHONE (OUTPATIENT)
Dept: FAMILY MEDICINE CLINIC | Facility: CLINIC | Age: 73
End: 2018-08-16

## 2018-08-16 NOTE — TELEPHONE ENCOUNTER
Patient needs a follow-up appointment I saw her only once  Hydrocodone is controlled substance and the prescription is needed and cannot be called in

## 2018-08-16 NOTE — TELEPHONE ENCOUNTER
Patients daughter called to request refill for hydrocodone, said that patient's L amputated side is still giving her pain.

## 2018-08-17 NOTE — TELEPHONE ENCOUNTER
Returned call to patient's daughter. Detailed VMML informing her we need to see patient before we can order a controlled substance pain medication. A prescription cannot be called in. Instructed to call office to make an appointment.  Office phone number

## 2018-08-17 NOTE — TELEPHONE ENCOUNTER
Patient's daughter called back. States patient had a second amputation 3 weeks ago and is now undergoing a breast biopsy this week for cancer. Patient has appt. With Dr. Kari Escobar  8/27/18 to discuss all diagnoses.   Daughter states it is difficult to get nikki

## 2018-08-27 ENCOUNTER — OFFICE VISIT (OUTPATIENT)
Dept: FAMILY MEDICINE CLINIC | Facility: CLINIC | Age: 73
End: 2018-08-27
Payer: MEDICARE

## 2018-08-27 VITALS
WEIGHT: 210 LBS | HEART RATE: 93 BPM | HEIGHT: 66 IN | RESPIRATION RATE: 16 BRPM | SYSTOLIC BLOOD PRESSURE: 104 MMHG | OXYGEN SATURATION: 98 % | BODY MASS INDEX: 33.75 KG/M2 | TEMPERATURE: 98 F | DIASTOLIC BLOOD PRESSURE: 62 MMHG

## 2018-08-27 DIAGNOSIS — G54.6 PHANTOM PAIN AFTER AMPUTATION OF LOWER EXTREMITY (HCC): Primary | ICD-10-CM

## 2018-08-27 PROCEDURE — 99213 OFFICE O/P EST LOW 20 MIN: CPT | Performed by: FAMILY MEDICINE

## 2018-08-27 RX ORDER — HYDROCODONE BITARTRATE AND ACETAMINOPHEN 10; 325 MG/1; MG/1
TABLET ORAL
Qty: 30 TABLET | Refills: 0 | Status: ON HOLD | OUTPATIENT
Start: 2018-08-27 | End: 2018-10-08

## 2018-08-27 RX ORDER — HYDROCODONE BITARTRATE AND ACETAMINOPHEN 10; 325 MG/1; MG/1
TABLET ORAL
Qty: 30 TABLET | Refills: 0 | Status: SHIPPED | OUTPATIENT
Start: 2018-08-27 | End: 2018-08-27

## 2018-08-27 NOTE — PROGRESS NOTES
/62   Pulse 93   Temp 98 °F (36.7 °C) (Oral)   Resp 16   Ht 66\"   Wt 210 lb   SpO2 98%   BMI 33.89 kg/m²               Patient presents with: Follow - Up: Status post above-knee amputation       HPI;    Anna Marino is a 68year old female. ferrous sulfate 325 (65 FE) MG Oral Tab EC Take 325 mg by mouth 2 (two) times daily with meals. Disp:  Rfl:    Calcium Carbonate-Vitamin D 600-400 MG-UNIT Oral Tab Take 1 tablet by mouth 2 (two) times daily.  Disp:  Rfl:    PEG 3350 Oral Powd Pack Take denies heartburn  NEURO: denies headaches  EXAM:   /62   Pulse 93   Temp 98 °F (36.7 °C) (Oral)   Resp 16   Ht 66\"   Wt 210 lb   SpO2 98%   BMI 33.89 kg/m²   GENERAL: well developed, well nourished,in no apparent distress  SKIN: no rashes,no suspici

## 2018-08-27 NOTE — DISCHARGE SUMMARY
Vascular Surgery  Surgical Discharge Summary    Admission Date: 7/10/2018   D/C Date: 7/16/2018  Discharge Diagnosis: gangrene  Discharge Condition: good      HISTORY OF PRESENT ILLNESS: Yanely Amor is a 68year old  female who was admitted Disp: 2 tablet Rfl: 0   Cyanocobalamin (B-12) 1000 MCG Oral Cap Take 1 capsule by mouth daily. Disp: 30 capsule Rfl: 3   DULoxetine HCl 30 MG Oral Cap DR Particles Take 2 capsules (60 mg total) by mouth daily.  Disp: 60 capsule Rfl: 3   furosemide 20 MG Ora HOSPITAL COURSE: The patient underwent a AKA on 7/10/2018   for gangrene.     EXAM ON DISCHARGE:  /57 (BP Location: Right arm)   Pulse 89   Temp 98.2 °F (36.8 °C) (Oral)   Resp 16   Ht 5' 6\" (1.676 m)   Wt 185 lb (83.9 kg)   SpO2 94%   BMI 29.8

## 2018-08-28 ENCOUNTER — HOSPITAL ENCOUNTER (OUTPATIENT)
Dept: MAMMOGRAPHY | Facility: HOSPITAL | Age: 73
Discharge: HOME | End: 2018-08-28
Attending: FAMILY MEDICINE
Payer: MEDICARE

## 2018-08-28 DIAGNOSIS — R92.8 ABNORMAL MAMMOGRAM: ICD-10-CM

## 2018-08-28 PROCEDURE — 77065 DX MAMMO INCL CAD UNI: CPT | Performed by: FAMILY MEDICINE

## 2018-08-28 PROCEDURE — 88360 TUMOR IMMUNOHISTOCHEM/MANUAL: CPT | Performed by: FAMILY MEDICINE

## 2018-08-28 PROCEDURE — 19083 BX BREAST 1ST LESION US IMAG: CPT | Performed by: FAMILY MEDICINE

## 2018-08-28 PROCEDURE — 88305 TISSUE EXAM BY PATHOLOGIST: CPT | Performed by: FAMILY MEDICINE

## 2018-08-29 ENCOUNTER — TELEPHONE (OUTPATIENT)
Dept: MAMMOGRAPHY | Facility: HOSPITAL | Age: 73
End: 2018-08-29

## 2018-08-29 ENCOUNTER — TELEPHONE (OUTPATIENT)
Dept: FAMILY MEDICINE CLINIC | Facility: CLINIC | Age: 73
End: 2018-08-29

## 2018-08-29 NOTE — TELEPHONE ENCOUNTER
Spoke with William Trinidad RN for Dr. Francis Turner, regarding pt's results for breast biopsy for a recommendation from him regarding a breast surgeon for pt. Awaiting response.

## 2018-08-29 NOTE — TELEPHONE ENCOUNTER
Request with the patient's daughter Paris Field  The name of the Mehnaz Duque MD Provider Summary   Summary   Sex: Female Date of birth:   Title: MD   Hospitalist?   Resident?   Nurse:     Years in practice:   Board certificate:   Provider type: Physi

## 2018-08-29 NOTE — TELEPHONE ENCOUNTER
[facility] called to inform Dr. Zainab Lau of positive Left breast biopsy result- invasive ductal carcinoma.   They would like patient to attend the Result Clinic tomorrow and are requesting the name of the Breast Surgeon Dr. Zainab Lau would like to refer the pa

## 2018-08-29 NOTE — TELEPHONE ENCOUNTER
Discussed the results with the patient's daughter Kannan Jeffries  She will make sure that tomorrow at 130 they will attend the results clinic  Please notify them of the surgeon

## 2018-09-02 ENCOUNTER — HOSPITAL ENCOUNTER (EMERGENCY)
Age: 73
Discharge: HOME | End: 2018-09-02
Payer: MEDICARE

## 2018-09-06 ENCOUNTER — TELEPHONE (OUTPATIENT)
Dept: FAMILY MEDICINE CLINIC | Facility: CLINIC | Age: 73
End: 2018-09-06

## 2018-09-06 NOTE — TELEPHONE ENCOUNTER
Returned call to Occupational Therapist.[facility] advising it is OK to begin OT 2x per week with one extra week as requested. Requested a call back for verification of how they want order documented.

## 2018-09-06 NOTE — TELEPHONE ENCOUNTER
Dominga Tam from [facility] regarding new order for Occ Therapy. 2x weekly with an additional week.   Call back # [phone]

## 2018-09-07 ENCOUNTER — OFFICE VISIT (OUTPATIENT)
Dept: SURGERY | Facility: CLINIC | Age: 73
End: 2018-09-07
Payer: MEDICARE

## 2018-09-07 ENCOUNTER — NURSE NAVIGATOR ENCOUNTER (OUTPATIENT)
Dept: HEMATOLOGY/ONCOLOGY | Facility: HOSPITAL | Age: 73
End: 2018-09-07

## 2018-09-07 VITALS
HEART RATE: 81 BPM | OXYGEN SATURATION: 96 % | WEIGHT: 210 LBS | DIASTOLIC BLOOD PRESSURE: 81 MMHG | BODY MASS INDEX: 33.75 KG/M2 | SYSTOLIC BLOOD PRESSURE: 118 MMHG | HEIGHT: 66 IN | RESPIRATION RATE: 20 BRPM

## 2018-09-07 DIAGNOSIS — Z17.0 MALIGNANT NEOPLASM OF UPPER-OUTER QUADRANT OF LEFT BREAST IN FEMALE, ESTROGEN RECEPTOR POSITIVE (HCC): Primary | ICD-10-CM

## 2018-09-07 DIAGNOSIS — C50.412 MALIGNANT NEOPLASM OF UPPER-OUTER QUADRANT OF LEFT BREAST IN FEMALE, ESTROGEN RECEPTOR POSITIVE (HCC): Primary | ICD-10-CM

## 2018-09-07 PROCEDURE — 99205 OFFICE O/P NEW HI 60 MIN: CPT | Performed by: SURGERY

## 2018-09-07 NOTE — PROGRESS NOTES
Met with patient and daughter in clinic. Introduced myself as the breast navigator nurse and explained my role and how I will work with all of the physicians involved in her care.  Explained the role of all of the physicians involved in her care including t

## 2018-09-07 NOTE — PROGRESS NOTES
Breast Surgery New Patient Consultation    This is the first visit for this 68year old woman, referred by Dr. Sandra Spencer, who presents for evaluation of Left breast cancer.     History of Present Illness:   Ms. Renee Velez is a 68year old woman who prese JOHN  2018: OTHER Left      Comment: left stump debridement  2018: OTHER Left      Comment: Left stump debridement per Dr. Will Burton  No date: OTHER      Comment: stent     Gynecological History:  Pt is a   Pt was 24years old at time of fi 6 (six) hours as needed. Nausea  Disp:  Rfl:    Multiple Vitamin (MULTI-VITAMIN DAILY) Oral Tab Take 1 tablet by mouth daily.  Disp:  Rfl:        Allergies:      Pcns [Penicillins]      RASH, SWELLING    Family History:   Family History   Problem Relation A indigestion, nausea, change in bowel habits, diarrhea, abdominal pain or vomiting blood.      Genitourinary:  The patient denies+ frequent urination, +needing to get up at night to urinate, urinary hesitancy or retaining urine, painful urination, +urinary i non-icteric. Chest: The chest expands symmetrically. The lungs are clear to auscultation. Heart: The rhythm is regular. There are no murmurs, rubs, gallops or thrills. Breasts:  Her breasts are symmetrical with a cup size DD.   Right breast: The s and invasive carcinoma, and the distinction between local and systemic disease and local and systemic therapy.  For local treatment options, I explained the risks and benefits of breast conservation and mastectomy (with or without reconstruction), including

## 2018-09-13 ENCOUNTER — TELEPHONE (OUTPATIENT)
Dept: SURGERY | Facility: CLINIC | Age: 73
End: 2018-09-13

## 2018-09-13 ENCOUNTER — TELEPHONE (OUTPATIENT)
Dept: HEMATOLOGY/ONCOLOGY | Facility: HOSPITAL | Age: 73
End: 2018-09-13

## 2018-09-13 NOTE — TELEPHONE ENCOUNTER
Spoke to patient's daughterBereket on the phone. She states that patient would like to proceed with lumpectomy and SLN biopsy. Informed daughter that we will have  call. Verbalizes understanding.

## 2018-09-13 NOTE — TELEPHONE ENCOUNTER
Spoke with patients daughter, and confirmed Trina Brain wants to proceed with left breast wire localized lumpectomy with SLn biopsy. Reviewed the procedure, and dates available. To be Scheduled for 10/8 at [facility].    Reviewed all pre op instructions, and th

## 2018-10-04 ENCOUNTER — TELEPHONE (OUTPATIENT)
Dept: CT IMAGING | Facility: HOSPITAL | Age: 73
End: 2018-10-04

## 2018-10-04 NOTE — TELEPHONE ENCOUNTER
Spoke with daughter of Josette Amor, regarding [place] Lymph Node mapping to be done in nuclear medicine department before SLNB/PM surgery scheduled for 10/8/18 with . Also reviewed wire localization to be done in [facility].  Both procedures

## 2018-10-04 NOTE — TELEPHONE ENCOUNTER
LMOVMTCB re pre surgical procedures.  for [facility] is full and unable to LM there. Call back number given. LiliN

## 2018-10-08 ENCOUNTER — ANESTHESIA EVENT (OUTPATIENT)
Dept: SURGERY | Facility: HOSPITAL | Age: 73
End: 2018-10-08
Payer: MEDICARE

## 2018-10-08 ENCOUNTER — HOSPITAL ENCOUNTER (OUTPATIENT)
Dept: MAMMOGRAPHY | Facility: HOSPITAL | Age: 73
Discharge: HOME | End: 2018-10-08
Attending: SURGERY
Payer: MEDICARE

## 2018-10-08 ENCOUNTER — HOSPITAL ENCOUNTER (OUTPATIENT)
Facility: HOSPITAL | Age: 73
Setting detail: HOSPITAL OUTPATIENT SURGERY
Discharge: HOME | End: 2018-10-08
Attending: SURGERY | Admitting: SURGERY
Payer: MEDICARE

## 2018-10-08 ENCOUNTER — HOSPITAL ENCOUNTER (OUTPATIENT)
Dept: NUCLEAR MEDICINE | Facility: HOSPITAL | Age: 73
Discharge: HOME | End: 2018-10-08
Attending: SURGERY | Admitting: SURGERY
Payer: MEDICARE

## 2018-10-08 ENCOUNTER — APPOINTMENT (OUTPATIENT)
Dept: MAMMOGRAPHY | Facility: HOSPITAL | Age: 73
End: 2018-10-08
Attending: SURGERY
Payer: MEDICARE

## 2018-10-08 ENCOUNTER — ANESTHESIA (OUTPATIENT)
Dept: SURGERY | Facility: HOSPITAL | Age: 73
End: 2018-10-08
Payer: MEDICARE

## 2018-10-08 VITALS
TEMPERATURE: 98 F | DIASTOLIC BLOOD PRESSURE: 53 MMHG | RESPIRATION RATE: 16 BRPM | SYSTOLIC BLOOD PRESSURE: 106 MMHG | HEART RATE: 90 BPM | WEIGHT: 190 LBS | OXYGEN SATURATION: 95 % | BODY MASS INDEX: 30.53 KG/M2 | HEIGHT: 66 IN

## 2018-10-08 DIAGNOSIS — C50.412 MALIGNANT NEOPLASM OF UPPER-OUTER QUADRANT OF LEFT BREAST IN FEMALE, ESTROGEN RECEPTOR POSITIVE (HCC): ICD-10-CM

## 2018-10-08 DIAGNOSIS — Z17.0 MALIGNANT NEOPLASM OF UPPER-OUTER QUADRANT OF LEFT BREAST IN FEMALE, ESTROGEN RECEPTOR POSITIVE (HCC): ICD-10-CM

## 2018-10-08 PROCEDURE — 88377 M/PHMTRC ALYS ISHQUANT/SEMIQ: CPT | Performed by: SURGERY

## 2018-10-08 PROCEDURE — 19285 PERQ DEV BREAST 1ST US IMAG: CPT | Performed by: SURGERY

## 2018-10-08 PROCEDURE — 38792 RA TRACER ID OF SENTINL NODE: CPT | Performed by: SURGERY

## 2018-10-08 PROCEDURE — 0HBU0ZZ EXCISION OF LEFT BREAST, OPEN APPROACH: ICD-10-PCS | Performed by: SURGERY

## 2018-10-08 PROCEDURE — 77065 DX MAMMO INCL CAD UNI: CPT | Performed by: SURGERY

## 2018-10-08 PROCEDURE — 76098 X-RAY EXAM SURGICAL SPECIMEN: CPT

## 2018-10-08 PROCEDURE — 88307 TISSUE EXAM BY PATHOLOGIST: CPT | Performed by: SURGERY

## 2018-10-08 PROCEDURE — 3E0W3KZ INTRODUCTION OF OTHER DIAGNOSTIC SUBSTANCE INTO LYMPHATICS, PERCUTANEOUS APPROACH: ICD-10-PCS | Performed by: SURGERY

## 2018-10-08 PROCEDURE — 07B60ZX EXCISION OF LEFT AXILLARY LYMPHATIC, OPEN APPROACH, DIAGNOSTIC: ICD-10-PCS | Performed by: SURGERY

## 2018-10-08 RX ORDER — HYDROCODONE BITARTRATE AND ACETAMINOPHEN 5; 325 MG/1; MG/1
1 TABLET ORAL AS NEEDED
Status: DISCONTINUED | OUTPATIENT
Start: 2018-10-08 | End: 2018-10-08

## 2018-10-08 RX ORDER — LIDOCAINE AND PRILOCAINE 25; 25 MG/G; MG/G
CREAM TOPICAL ONCE
Status: COMPLETED | OUTPATIENT
Start: 2018-10-08 | End: 2018-10-08

## 2018-10-08 RX ORDER — HYDROCODONE BITARTRATE AND ACETAMINOPHEN 10; 325 MG/1; MG/1
1-2 TABLET ORAL EVERY 6 HOURS PRN
Qty: 40 TABLET | Refills: 0 | Status: SHIPPED | OUTPATIENT
Start: 2018-10-08 | End: 2019-01-21

## 2018-10-08 RX ORDER — MORPHINE SULFATE 4 MG/ML
INJECTION, SOLUTION INTRAMUSCULAR; INTRAVENOUS
Status: COMPLETED
Start: 2018-10-08 | End: 2018-10-08

## 2018-10-08 RX ORDER — SODIUM CHLORIDE, SODIUM LACTATE, POTASSIUM CHLORIDE, CALCIUM CHLORIDE 600; 310; 30; 20 MG/100ML; MG/100ML; MG/100ML; MG/100ML
INJECTION, SOLUTION INTRAVENOUS CONTINUOUS
Status: DISCONTINUED | OUTPATIENT
Start: 2018-10-08 | End: 2018-10-08

## 2018-10-08 RX ORDER — HYDROCODONE BITARTRATE AND ACETAMINOPHEN 5; 325 MG/1; MG/1
2 TABLET ORAL ONCE
Status: COMPLETED | OUTPATIENT
Start: 2018-10-08 | End: 2018-10-08

## 2018-10-08 RX ORDER — ACETAMINOPHEN 500 MG
1000 TABLET ORAL ONCE AS NEEDED
Status: DISCONTINUED | OUTPATIENT
Start: 2018-10-08 | End: 2018-10-08

## 2018-10-08 RX ORDER — MORPHINE SULFATE 4 MG/ML
2 INJECTION, SOLUTION INTRAMUSCULAR; INTRAVENOUS EVERY 5 MIN PRN
Status: DISCONTINUED | OUTPATIENT
Start: 2018-10-08 | End: 2018-10-08

## 2018-10-08 RX ORDER — HYDROCODONE BITARTRATE AND ACETAMINOPHEN 5; 325 MG/1; MG/1
2 TABLET ORAL AS NEEDED
Status: DISCONTINUED | OUTPATIENT
Start: 2018-10-08 | End: 2018-10-08

## 2018-10-08 RX ORDER — CLINDAMYCIN PHOSPHATE 900 MG/50ML
900 INJECTION INTRAVENOUS ONCE
Status: DISCONTINUED | OUTPATIENT
Start: 2018-10-08 | End: 2018-10-08 | Stop reason: HOSPADM

## 2018-10-08 RX ORDER — NALOXONE HYDROCHLORIDE 0.4 MG/ML
80 INJECTION, SOLUTION INTRAMUSCULAR; INTRAVENOUS; SUBCUTANEOUS AS NEEDED
Status: DISCONTINUED | OUTPATIENT
Start: 2018-10-08 | End: 2018-10-08

## 2018-10-08 RX ORDER — LIDOCAINE HYDROCHLORIDE AND EPINEPHRINE 10; 10 MG/ML; UG/ML
INJECTION, SOLUTION INFILTRATION; PERINEURAL AS NEEDED
Status: DISCONTINUED | OUTPATIENT
Start: 2018-10-08 | End: 2018-10-08 | Stop reason: HOSPADM

## 2018-10-08 RX ORDER — ACETAMINOPHEN 500 MG
1000 TABLET ORAL ONCE
Status: DISCONTINUED | OUTPATIENT
Start: 2018-10-08 | End: 2018-10-08 | Stop reason: HOSPADM

## 2018-10-08 RX ORDER — HYDROCODONE BITARTRATE AND ACETAMINOPHEN 5; 325 MG/1; MG/1
TABLET ORAL
Status: COMPLETED
Start: 2018-10-08 | End: 2018-10-08

## 2018-10-08 RX ORDER — DIAZEPAM 5 MG/1
5 TABLET ORAL AS NEEDED
Status: DISCONTINUED | OUTPATIENT
Start: 2018-10-08 | End: 2018-10-08 | Stop reason: HOSPADM

## 2018-10-08 RX ORDER — ACETAMINOPHEN 500 MG
1000 TABLET ORAL EVERY 6 HOURS PRN
COMMUNITY
End: 2018-10-16 | Stop reason: ALTCHOICE

## 2018-10-08 RX ORDER — BUPIVACAINE HYDROCHLORIDE 5 MG/ML
INJECTION, SOLUTION EPIDURAL; INTRACAUDAL AS NEEDED
Status: DISCONTINUED | OUTPATIENT
Start: 2018-10-08 | End: 2018-10-08 | Stop reason: HOSPADM

## 2018-10-08 RX ADMIN — HYDROCODONE BITARTRATE AND ACETAMINOPHEN 2 TABLET: 5; 325 TABLET ORAL at 10:30:00

## 2018-10-08 NOTE — ANESTHESIA PREPROCEDURE EVALUATION
PRE-OP EVALUATION    Patient Name: Hugh Kerr    Pre-op Diagnosis: Malignant neoplasm of upper-outer quadrant of left breast in female, estrogen receptor positive ([facility] [C50.412, Z17.0]    Procedure(s):  LEFT BREAST WIRE LOCALIZED LUMPECTOMY, LEFT TraZODone HCl 50 MG Oral Tab Take 1 tablet (50 mg total) by mouth nightly as needed for Sleep. Disp: 30 tablet Rfl: 0       Allergies: Pcns [Penicillins]      Anesthesia Evaluation    Patient summary reviewed.     Anesthetic Complications  (-) history of AMPUTATION ABOVE/BELOW; Left      Comment:  Performed by Angela Campos MD at [email or web address]  4/9/2018: KNEE AMPUTATION ABOVE/BELOW; Left      Comment:  Performed by Angela Campos MD at [email or web address]  RT HIP: OTHER  04/09/2018: OTHER      Comment:  Left BKA Admission:  **None**

## 2018-10-08 NOTE — IMAGING NOTE
Assisted Dr. Pamela Dougherty with needle localization for breast for lumpectomy. Procedure explained and all questions answered. Pt verbalized understanding. Emotional support provided and pt tolerated procedure well with minimal discomfort.  Gauze dressing placed a

## 2018-10-08 NOTE — H&P
History of Present Illness:   Ms. Pilar Allen is a 68year old woman who presents with an imaging detected left breast cancer. The patient has undergone recent amputation of her left lower extremity secondary to vascular disease.   She underwent a C per Dr. Azul Clark  No date: OTHER      Comment: stent      Gynecological History:  Pt is a   Pt was 24years old at time of first pregnancy. She has cumulative breastfeeding history of 6 months, last unknown.   She achieved menarche at age 16   Age daily. Disp:  Rfl:          Allergies:       Pcns [Penicillins]      RASH, SWELLING     Family History:          Family History   Problem Relation Age of Onset   • Diabetes Mother     • Cancer Father 79       prostate   • Cancer Sister 61       cervical    Genitourinary:  The patient denies+ frequent urination, +needing to get up at night to urinate, urinary hesitancy or retaining urine, painful urination, +urinary incontinence, decreased urine stream, blood in the urine or vaginal/penile discharge.    auscultation.     Heart: The rhythm is regular. There are no murmurs, rubs, gallops or thrills.     Breasts:  Her breasts are symmetrical with a cup size DD.   Right breast: The skin, nipple ,and areola appear normal. There is no skin dimpling with movemen systemic disease and local and systemic therapy.  For local treatment options, I explained the risks and benefits of breast conservation and mastectomy (with or without reconstruction), including the fact that survival rates are equal with these two approac was examined and no significant changes have occurred in the patient's condition since the H&P was performed.   I discussed with the patient and/or legal representative the potential benefits, risks and side effects of this procedure; the likelihood of the

## 2018-10-08 NOTE — BRIEF OP NOTE
Pre-Operative Diagnosis: Malignant neoplasm of upper-outer quadrant of left breast in female, estrogen receptor positive ([facility] [C50.412, Z17.0]     Post-Operative Diagnosis: Malignant neoplasm of upper-outer quadrant of left breast in female, estrogen rece

## 2018-10-08 NOTE — ANESTHESIA POSTPROCEDURE EVALUATION
[address] [place] [address] Patient Status:  Hospital Outpatient Surgery   Age/Gender 68year old female MRN [id number]   [facility] SURGERY Attending Landen Crespo MD   Hosp Day # 0 PCP Cyrus Enriquez MD       Anesthesia Post-op

## 2018-10-08 NOTE — IMAGING NOTE
Rec'd call from  [address] re Jay Oliva. Pt c/o pain and unsure she wants to do the procedure. This RN sat with pt are explained the role of SLN mapping and the wire localization procedure to pt.  Pt stated she \"is in so much pain in my leg and

## 2018-10-09 NOTE — OPERATIVE REPORT
[address]    PATIENT'S NAME: Flory Santizo [facility]   ATTENDING PHYSICIAN: Navneet Barnes M.D. OPERATING PHYSICIAN: Navneet Jean. Gwen Barnes M.D.    PATIENT ACCOUNT#:   [de-identified]    LOCATION:  PREOPASCC EH PRE ASCC 8 EDWP 10  MEDICAL RECORD #:   [id number] possible complications of infection, bleeding, injury to surrounding structures, possible need for reoperation were all discussed with her.     OPERATIVE TECHNIQUE:  Patient was brought to the imaging suite where she underwent a wire localization and also w field.  During this, we encountered the palpable mass. Using sharp dissection and electrocautery, a segment of breast tissue surrounding the tip of the wire was excised.   It was oriented with a short stitch and 1 hemoclip in the superior position and a lo

## 2018-10-11 ENCOUNTER — TELEPHONE (OUTPATIENT)
Dept: SURGERY | Facility: CLINIC | Age: 73
End: 2018-10-11

## 2018-10-11 NOTE — TELEPHONE ENCOUNTER
Spoke with patient, she is doing well post op. Reviewed with her that margins, and LN are clear. Pt verbalized understanding,  Will go over it more in detail at her post op visit. Confirmed post op day and time. Pt to call sooner if needed.

## 2018-10-16 ENCOUNTER — OFFICE VISIT (OUTPATIENT)
Dept: SURGERY | Facility: CLINIC | Age: 73
End: 2018-10-16
Payer: MEDICARE

## 2018-10-16 VITALS — BODY MASS INDEX: 30.53 KG/M2 | HEIGHT: 66 IN | WEIGHT: 190 LBS

## 2018-10-16 DIAGNOSIS — Z17.0 MALIGNANT NEOPLASM OF UPPER-OUTER QUADRANT OF LEFT BREAST IN FEMALE, ESTROGEN RECEPTOR POSITIVE (HCC): Primary | ICD-10-CM

## 2018-10-16 DIAGNOSIS — C50.412 MALIGNANT NEOPLASM OF UPPER-OUTER QUADRANT OF LEFT BREAST IN FEMALE, ESTROGEN RECEPTOR POSITIVE (HCC): Primary | ICD-10-CM

## 2018-10-17 NOTE — PROGRESS NOTES
Breast Surgery Post-Operative Visit    Diagnosis: Left breast cancer status post left lumpectomy and sentinel lymph node biopsy on October 8, 2000    Stage: T2 N0 MX.     Disease Status:  Surgical treatment complete, medical and radiation oncology recommend weakness    • Neuropathy    • Osteoarthritis    • Peripheral vascular disease ([facility] [address]    • Sleep apnea     unable to tolerate machine       Past Surgical History:   Procedure Laterality Date   • BREAST SENTINEL LYMPH NODE BIOPSY Left 10/8/2018    Performed b daily. Disp: 30 tablet Rfl: 6   ferrous sulfate 325 (65 FE) MG Oral Tab EC Take 325 mg by mouth 2 (two) times daily with meals. Disp:  Rfl:    Calcium Carbonate-Vitamin D 600-400 MG-UNIT Oral Tab Take 1 tablet by mouth 2 (two) times daily.  Disp:  Rfl: vomiting, dark or bloody stools, constipation, yellowing of the skin, indigestion, nausea, change in bowel habits, diarrhea, abdominal pain or vomiting blood.      Genitourinary:  The patient denies+ frequent urination, +needing to get up at night to [facility] personally reviewed her pathology which showed negative margins and lymph node for which no further surgery will be needed. We presented her case at our multidisciplinary clinic.   The patient has multiple comorbidities including a recent amputation and is

## 2018-10-30 ENCOUNTER — OFFICE VISIT (OUTPATIENT)
Dept: SURGERY | Facility: CLINIC | Age: 73
End: 2018-10-30
Payer: MEDICARE

## 2018-10-30 ENCOUNTER — NURSE NAVIGATOR ENCOUNTER (OUTPATIENT)
Dept: HEMATOLOGY/ONCOLOGY | Facility: HOSPITAL | Age: 73
End: 2018-10-30

## 2018-10-30 ENCOUNTER — OFFICE VISIT (OUTPATIENT)
Dept: HEMATOLOGY/ONCOLOGY | Facility: HOSPITAL | Age: 73
End: 2018-10-30
Attending: INTERNAL MEDICINE
Payer: MEDICARE

## 2018-10-30 VITALS
BODY MASS INDEX: 31 KG/M2 | RESPIRATION RATE: 16 BRPM | OXYGEN SATURATION: 92 % | SYSTOLIC BLOOD PRESSURE: 137 MMHG | HEART RATE: 54 BPM | DIASTOLIC BLOOD PRESSURE: 84 MMHG | HEIGHT: 65.98 IN | TEMPERATURE: 96 F

## 2018-10-30 VITALS
SYSTOLIC BLOOD PRESSURE: 137 MMHG | DIASTOLIC BLOOD PRESSURE: 84 MMHG | BODY MASS INDEX: 31 KG/M2 | OXYGEN SATURATION: 92 % | RESPIRATION RATE: 16 BRPM | HEART RATE: 54 BPM | HEIGHT: 65.98 IN | TEMPERATURE: 96 F

## 2018-10-30 DIAGNOSIS — Z17.0 MALIGNANT NEOPLASM OF UPPER-INNER QUADRANT OF LEFT BREAST IN FEMALE, ESTROGEN RECEPTOR POSITIVE (HCC): Primary | ICD-10-CM

## 2018-10-30 DIAGNOSIS — Z17.0 MALIGNANT NEOPLASM OF UPPER-OUTER QUADRANT OF LEFT BREAST IN FEMALE, ESTROGEN RECEPTOR POSITIVE (HCC): Primary | ICD-10-CM

## 2018-10-30 DIAGNOSIS — C50.212 MALIGNANT NEOPLASM OF UPPER-INNER QUADRANT OF LEFT BREAST IN FEMALE, ESTROGEN RECEPTOR POSITIVE (HCC): Primary | ICD-10-CM

## 2018-10-30 DIAGNOSIS — C50.412 MALIGNANT NEOPLASM OF UPPER-OUTER QUADRANT OF LEFT BREAST IN FEMALE, ESTROGEN RECEPTOR POSITIVE (HCC): Primary | ICD-10-CM

## 2018-10-30 PROBLEM — C50.411 MALIGNANT NEOPLASM OF UPPER-OUTER QUADRANT OF RIGHT BREAST IN FEMALE, ESTROGEN RECEPTOR POSITIVE: Status: ACTIVE | Noted: 2018-10-30

## 2018-10-30 PROBLEM — C50.411 MALIGNANT NEOPLASM OF UPPER-OUTER QUADRANT OF RIGHT BREAST IN FEMALE, ESTROGEN RECEPTOR POSITIVE (HCC): Status: ACTIVE | Noted: 2018-10-30

## 2018-10-30 PROCEDURE — 99024 POSTOP FOLLOW-UP VISIT: CPT | Performed by: SURGERY

## 2018-10-30 PROCEDURE — 99205 OFFICE O/P NEW HI 60 MIN: CPT | Performed by: INTERNAL MEDICINE

## 2018-10-30 RX ORDER — LETROZOLE 2.5 MG/1
2.5 TABLET, FILM COATED ORAL DAILY
Qty: 90 TABLET | Refills: 3 | Status: SHIPPED | OUTPATIENT
Start: 2018-10-30 | End: 2019-01-21

## 2018-10-30 NOTE — CONSULTS
Cancer Center Report of Consultation    Patient Name: Renee Velez   YOB: 1945   Medical Record Number: [id number]   CSN: [id number]   Consulting Physician: Zak Amezquita MD  Referring Physician(s): No ref.  provider found  Date of Consul • CATH DRUG ELUTING STENT     • EXTREMITY LOWER IRRIGATION & DEBRIDEMENT Left 5/17/2018    Performed by Elena Coleman MD at [address]   • Alyssabury Left 5/4/2018    Performed by Elena Coleman MD at [address]   • [address] [place] [address] Diet: Not Asked        Stress Concern: Not Asked        Weight Concern: Not Asked    Social History Narrative      Not on file      Allergies:     Pcns [Penicillins]      RASH, SWELLING    Current Medications:    Current Outpatient Medications:   •  letroz Neck is supple. Lymphatics: There is no palpable lymphadenopathy throughout in the cervical, supraclavicular, axillary, or inguinal regions. Chest: Clear to auscultation. No wheezes or rales. Heart: Regular rate and rhythm.  S1S2 normal.  Abdomen: Soft,

## 2018-10-30 NOTE — PROGRESS NOTES
Patient met with Dr. Magno Gonzalez, recommendation for AI. Doing well post surgery. Will follow up with Dr. Magno Gonzalez in 6 months. She will phone in the interim if she has any difficulty with the medication in the mean time. Patient and daughter verbalize understanding.

## 2018-10-30 NOTE — PROGRESS NOTES
Patient is here today for Consult with Gillian Olivas for Breast cancer . Patient denies pain. Stated has been light headed and dizzy. Medication list and medical history were reviewed and updated.      Education Record    Learner:  Patient    Disease / Diagnosis:

## 2019-01-14 ENCOUNTER — TELEPHONE (OUTPATIENT)
Dept: FAMILY MEDICINE CLINIC | Facility: CLINIC | Age: 74
End: 2019-01-14

## 2019-01-14 NOTE — TELEPHONE ENCOUNTER
Patient's Daughter called. Did Dr. Daren Fernandez receive Disability papers for her mom? Does patient need an Appt in order for Dr. Daren Fernandez to sign these papers? Please call patient's daughter back to advise.

## 2019-01-14 NOTE — TELEPHONE ENCOUNTER
Spoke with daughter, she agreed to appt    Future Appointments   Date Time Provider Luna Dhillon   1/21/2019 12:30 PM Radha Chen MD [facility] IM

## 2019-01-21 ENCOUNTER — OFFICE VISIT (OUTPATIENT)
Dept: FAMILY MEDICINE CLINIC | Facility: CLINIC | Age: 74
End: 2019-01-21
Payer: MEDICARE

## 2019-01-21 VITALS
DIASTOLIC BLOOD PRESSURE: 70 MMHG | TEMPERATURE: 98 F | WEIGHT: 230.56 LBS | SYSTOLIC BLOOD PRESSURE: 124 MMHG | OXYGEN SATURATION: 96 % | HEART RATE: 103 BPM | RESPIRATION RATE: 16 BRPM | HEIGHT: 65.98 IN | BODY MASS INDEX: 37.05 KG/M2

## 2019-01-21 DIAGNOSIS — C50.412 MALIGNANT NEOPLASM OF UPPER-OUTER QUADRANT OF LEFT BREAST IN FEMALE, ESTROGEN RECEPTOR POSITIVE (HCC): ICD-10-CM

## 2019-01-21 DIAGNOSIS — Z02.89 ENCOUNTER FOR COMPLETION OF FORM WITH PATIENT: ICD-10-CM

## 2019-01-21 DIAGNOSIS — Z17.0 MALIGNANT NEOPLASM OF UPPER-OUTER QUADRANT OF LEFT BREAST IN FEMALE, ESTROGEN RECEPTOR POSITIVE (HCC): ICD-10-CM

## 2019-01-21 DIAGNOSIS — G54.6 PHANTOM LIMB PAIN (HCC): Primary | ICD-10-CM

## 2019-01-21 PROCEDURE — 99213 OFFICE O/P EST LOW 20 MIN: CPT | Performed by: FAMILY MEDICINE

## 2019-01-21 RX ORDER — LETROZOLE 2.5 MG/1
2.5 TABLET, FILM COATED ORAL DAILY
Qty: 90 TABLET | Refills: 3 | Status: SHIPPED | OUTPATIENT
Start: 2019-01-21 | End: 2019-10-10

## 2019-01-21 RX ORDER — GABAPENTIN 100 MG/1
CAPSULE ORAL
Qty: 90 CAPSULE | Refills: 3 | Status: SHIPPED | OUTPATIENT
Start: 2019-01-21 | End: 2019-04-22

## 2019-01-21 RX ORDER — HYDROCODONE BITARTRATE AND ACETAMINOPHEN 10; 325 MG/1; MG/1
1-2 TABLET ORAL EVERY 6 HOURS PRN
Qty: 40 TABLET | Refills: 0 | Status: SHIPPED | OUTPATIENT
Start: 2019-01-21 | End: 2019-04-22

## 2019-01-21 RX ORDER — DULOXETIN HYDROCHLORIDE 30 MG/1
CAPSULE, DELAYED RELEASE ORAL
Qty: 30 CAPSULE | Refills: 3 | Status: SHIPPED | OUTPATIENT
Start: 2019-01-21 | End: 2019-04-22

## 2019-01-21 NOTE — PROGRESS NOTES
/70   Pulse 103   Temp 98.2 °F (36.8 °C) (Oral)   Resp 16   Ht 65.98\"   Wt 230 lb 9 oz   SpO2 96%   BMI 37.24 kg/m²               Patient presents with:  Complete Form       HPI;    Kiara Schmitt is a 68year old female.   With history of left A Carbonate-Vitamin D 600-400 MG-UNIT Oral Tab Take 1 tablet by mouth 2 (two) times daily. Disp:  Rfl:    Multiple Vitamin (MULTI-VITAMIN DAILY) Oral Tab Take 1 tablet by mouth daily.  Disp:  Rfl:       Past Medical History:   Diagnosis Date   • CAD (coronary 6 (six) hours as needed for Pain.  -     DULoxetine HCl 30 MG Oral Cap DR Particles; duloxetine 30 mg capsule,delayed release  -     gabapentin 100 MG Oral Cap; gabapentin 100 mg capsule three times a day    Malignant neoplasm of upper-outer quadrant of le

## 2019-04-18 ENCOUNTER — TELEPHONE (OUTPATIENT)
Dept: HEMATOLOGY/ONCOLOGY | Facility: HOSPITAL | Age: 74
End: 2019-04-18

## 2019-04-18 NOTE — TELEPHONE ENCOUNTER
Left message with family member for patient to phone back, contact information provided. Attempting to assist patient in scheduling follow up with Dr. Debby Snow, as well as survivorship visit.

## 2019-04-22 ENCOUNTER — TELEPHONE (OUTPATIENT)
Dept: FAMILY MEDICINE CLINIC | Facility: CLINIC | Age: 74
End: 2019-04-22

## 2019-04-22 ENCOUNTER — OFFICE VISIT (OUTPATIENT)
Dept: FAMILY MEDICINE CLINIC | Facility: CLINIC | Age: 74
End: 2019-04-22
Payer: MEDICARE

## 2019-04-22 VITALS
SYSTOLIC BLOOD PRESSURE: 132 MMHG | HEART RATE: 79 BPM | TEMPERATURE: 98 F | RESPIRATION RATE: 16 BRPM | OXYGEN SATURATION: 96 % | HEIGHT: 65.98 IN | BODY MASS INDEX: 37 KG/M2 | DIASTOLIC BLOOD PRESSURE: 84 MMHG

## 2019-04-22 DIAGNOSIS — G47.33 OBSTRUCTIVE SLEEP APNEA SYNDROME: Chronic | ICD-10-CM

## 2019-04-22 DIAGNOSIS — J44.9 CHRONIC OBSTRUCTIVE PULMONARY DISEASE, UNSPECIFIED COPD TYPE (HCC): ICD-10-CM

## 2019-04-22 DIAGNOSIS — Z23 NEED FOR VACCINATION: ICD-10-CM

## 2019-04-22 DIAGNOSIS — G54.6 PHANTOM LIMB PAIN (HCC): ICD-10-CM

## 2019-04-22 DIAGNOSIS — K21.9 GASTROESOPHAGEAL REFLUX DISEASE WITHOUT ESOPHAGITIS: ICD-10-CM

## 2019-04-22 DIAGNOSIS — Z12.31 VISIT FOR SCREENING MAMMOGRAM: ICD-10-CM

## 2019-04-22 DIAGNOSIS — S78.112A ABOVE KNEE AMPUTATION OF LEFT LOWER EXTREMITY (HCC): ICD-10-CM

## 2019-04-22 DIAGNOSIS — Z00.00 ENCOUNTER FOR ANNUAL HEALTH EXAMINATION: Primary | ICD-10-CM

## 2019-04-22 DIAGNOSIS — Z87.891 PERSONAL HISTORY OF TOBACCO USE, PRESENTING HAZARDS TO HEALTH: ICD-10-CM

## 2019-04-22 DIAGNOSIS — M32.9 SYSTEMIC LUPUS ERYTHEMATOSUS, UNSPECIFIED SLE TYPE, UNSPECIFIED ORGAN INVOLVEMENT STATUS (HCC): ICD-10-CM

## 2019-04-22 DIAGNOSIS — R32 URINARY INCONTINENCE, UNSPECIFIED TYPE: ICD-10-CM

## 2019-04-22 DIAGNOSIS — F17.210 CIGARETTE SMOKER: ICD-10-CM

## 2019-04-22 DIAGNOSIS — C50.912 MALIGNANT NEOPLASM OF LEFT FEMALE BREAST, UNSPECIFIED ESTROGEN RECEPTOR STATUS, UNSPECIFIED SITE OF BREAST (HCC): ICD-10-CM

## 2019-04-22 DIAGNOSIS — Z11.59 NEED FOR HEPATITIS C SCREENING TEST: ICD-10-CM

## 2019-04-22 DIAGNOSIS — Z13.6 SCREENING FOR CARDIOVASCULAR CONDITION: ICD-10-CM

## 2019-04-22 DIAGNOSIS — F17.219 CIGARETTE NICOTINE DEPENDENCE WITH NICOTINE-INDUCED DISORDER: ICD-10-CM

## 2019-04-22 PROBLEM — I99.8 VASCULAR OCCLUSION: Status: RESOLVED | Noted: 2018-03-28 | Resolved: 2019-04-22

## 2019-04-22 PROBLEM — L03.90 CELLULITIS: Status: RESOLVED | Noted: 2018-05-02 | Resolved: 2019-04-22

## 2019-04-22 PROBLEM — L08.9 WOUND INFECTION: Status: RESOLVED | Noted: 2018-07-10 | Resolved: 2019-04-22

## 2019-04-22 PROBLEM — T14.8XXA WOUND INFECTION: Status: RESOLVED | Noted: 2018-07-10 | Resolved: 2019-04-22

## 2019-04-22 PROBLEM — T87.40 AMPUTATION STUMP INFECTION (HCC): Status: RESOLVED | Noted: 2018-05-02 | Resolved: 2019-04-22

## 2019-04-22 PROCEDURE — G0439 PPPS, SUBSEQ VISIT: HCPCS | Performed by: FAMILY MEDICINE

## 2019-04-22 PROCEDURE — 99406 BEHAV CHNG SMOKING 3-10 MIN: CPT | Performed by: FAMILY MEDICINE

## 2019-04-22 PROCEDURE — 99497 ADVNCD CARE PLAN 30 MIN: CPT | Performed by: FAMILY MEDICINE

## 2019-04-22 RX ORDER — DULOXETIN HYDROCHLORIDE 30 MG/1
CAPSULE, DELAYED RELEASE ORAL
Qty: 90 CAPSULE | Refills: 3 | Status: SHIPPED | OUTPATIENT
Start: 2019-04-22 | End: 2019-10-10

## 2019-04-22 RX ORDER — GABAPENTIN 100 MG/1
CAPSULE ORAL
Qty: 90 CAPSULE | Refills: 3 | Status: SHIPPED | OUTPATIENT
Start: 2019-04-22 | End: 2019-10-10

## 2019-04-22 RX ORDER — OXYBUTYNIN CHLORIDE 5 MG/1
5 TABLET, EXTENDED RELEASE ORAL DAILY
Qty: 30 TABLET | Refills: 3 | Status: SHIPPED | OUTPATIENT
Start: 2019-04-22 | End: 2019-07-15

## 2019-04-22 RX ORDER — HYDROCODONE BITARTRATE AND ACETAMINOPHEN 10; 325 MG/1; MG/1
1-2 TABLET ORAL EVERY 6 HOURS PRN
Qty: 40 TABLET | Refills: 0 | Status: SHIPPED | OUTPATIENT
Start: 2019-04-22 | End: 2019-07-15

## 2019-04-22 RX ORDER — ROSUVASTATIN CALCIUM 5 MG/1
5 TABLET, COATED ORAL NIGHTLY
Qty: 90 TABLET | Refills: 3 | Status: SHIPPED | OUTPATIENT
Start: 2019-04-22 | End: 2019-04-23

## 2019-04-22 NOTE — PROGRESS NOTES
HPI:   Amanda Meza is a 69year old female who presents for a Medicare Subsequent Annual Wellness visit (Pt already had Initial Annual Wellness).   Patient with history of multiple medical problems including hypertension hyperlipidemia, coronary ar standard forms performed Face to Face with patient and Family/surrogate (if present), and forms available to patient in AVS         She has a Power of  for [employer] on file in [address]      She currently smokes tobacco.  Social History    Tobacco Us ALT 14 07/10/2018    CA 9.2 07/16/2018    ALB 2.6 (L) 07/10/2018    CREATSERUM 0.68 07/16/2018     (H) 07/16/2018        CBC  (most recent labs)   Lab Results   Component Value Date    WBC 6.3 07/16/2018    HGB 8.0 (L) 07/16/2018    .0 07/16/ other.    Her family history includes Cancer (age of onset: 61) in her sister; Cancer (age of onset: 79) in her father; Diabetes in her mother. SOCIAL HISTORY:   She  reports that she has been smoking. She has a 14.00 pack-year smoking history.  She has edema  NEURO: Oriented times three, cranial nerves are intact, motor and sensory are grossly intact    Vaccination History     Immunization History   Administered Date(s) Administered   • None   Deferred Date(s) Deferred   • FLUAD High Dose 65 yr and older unspecified organ involvement status ([facility]  -     HOME HEALTH (EXT)    Personal history of tobacco use, presenting hazards to health/Cigarette smoker  -     BEHAV CHNG SMOKING GR THAN 3 UP TO 10 MIN    Screening for cardiovascular condition  -     LIPID PA Family; Visiting Friends;Puzzles      This section provided for quick review of chart, separate sheet to patient  [address] [place] [address],[address] Internal Lab or Procedure External Lab or Procedure   Diabetes Screening      HbgA1C   Annually (Pneumovax)  Covered Once after 65 No vaccine history found Please get once after your 65th birthday    Hepatitis B for Moderate/High Risk No vaccine history found Medium/high risk factors:   End-stage renal disease   Hemophiliacs who received Factor VIII harms and benefits. Specifically, she was told that Quitting tobacco is one of the best things she can do for her health. I strongly encouraged her to quit. Agree:  We collaboratively selected appropriate treatment goals and methods based on the anu

## 2019-04-22 NOTE — PATIENT INSTRUCTIONS
[address] [facility] SCREENING SCHEDULE   Tests on this list are recommended by your physician but may not be covered, or covered at this frequency, by your insurer. Please check with your insurance carrier before scheduling to verify coverage.    PREVENT Sigmoidoscopy Screen  Covered every 5 years No results found for this or any previous visit. No flowsheet data found. Fecal Occult Blood   Covered Annually No results found for: FOB, OCCULTSTOOL No flowsheet data found.      Barium Enema-   uncomfortabl 66th birthday    Hepatitis B for Moderate/High Risk       No orders found for this or any previous visit.  Medium/high risk factors:   End-stage renal disease   Hemophiliacs who received Factor VIII or IX concentrates   Clients of institutions for the [facility]

## 2019-04-22 NOTE — TELEPHONE ENCOUNTER
CALL RECEIVED FROM [facility] REGARDING RX RECEIVED FOR GENERIC CRESTOR TODAY    PT'S INSURANCE WILL NOT COVER UNLESS PT HAS TRIED AND FAILED 2 OTHER GENERICS    PLEASE ADVISE ANOTHER CHOLESTEROL MEDICATION:    ATORVASTATIN  SIMVASTATIN  PRAVASTATIN

## 2019-04-23 RX ORDER — ATORVASTATIN CALCIUM 10 MG/1
10 TABLET, FILM COATED ORAL NIGHTLY
Qty: 90 TABLET | Refills: 3 | Status: SHIPPED | OUTPATIENT
Start: 2019-04-23 | End: 2019-10-10

## 2019-04-26 ENCOUNTER — TELEPHONE (OUTPATIENT)
Dept: FAMILY MEDICINE CLINIC | Facility: CLINIC | Age: 74
End: 2019-04-26

## 2019-04-26 RX ORDER — CIPROFLOXACIN 250 MG/1
250 TABLET, FILM COATED ORAL 2 TIMES DAILY
Qty: 14 TABLET | Refills: 0 | Status: SHIPPED | OUTPATIENT
Start: 2019-04-26 | End: 2019-05-02

## 2019-05-02 ENCOUNTER — TELEPHONE (OUTPATIENT)
Dept: FAMILY MEDICINE CLINIC | Facility: CLINIC | Age: 74
End: 2019-05-02

## 2019-05-02 RX ORDER — SULFAMETHOXAZOLE AND TRIMETHOPRIM 800; 160 MG/1; MG/1
1 TABLET ORAL 2 TIMES DAILY
Qty: 14 TABLET | Refills: 0 | Status: SHIPPED | OUTPATIENT
Start: 2019-05-02 | End: 2019-05-09

## 2019-05-09 ENCOUNTER — MED REC SCAN ONLY (OUTPATIENT)
Dept: FAMILY MEDICINE CLINIC | Facility: CLINIC | Age: 74
End: 2019-05-09

## 2019-05-23 ENCOUNTER — HOSPITAL ENCOUNTER (OUTPATIENT)
Dept: MAMMOGRAPHY | Facility: HOSPITAL | Age: 74
Discharge: HOME | End: 2019-05-23
Attending: FAMILY MEDICINE
Payer: MEDICARE

## 2019-05-23 DIAGNOSIS — Z17.0 MALIGNANT NEOPLASM OF UPPER-OUTER QUADRANT OF LEFT BREAST IN FEMALE, ESTROGEN RECEPTOR POSITIVE (HCC): ICD-10-CM

## 2019-05-23 DIAGNOSIS — Z12.31 VISIT FOR SCREENING MAMMOGRAM: ICD-10-CM

## 2019-05-23 DIAGNOSIS — C50.412 MALIGNANT NEOPLASM OF UPPER-OUTER QUADRANT OF LEFT BREAST IN FEMALE, ESTROGEN RECEPTOR POSITIVE (HCC): ICD-10-CM

## 2019-05-23 PROCEDURE — 77061 BREAST TOMOSYNTHESIS UNI: CPT | Performed by: SURGERY

## 2019-05-23 PROCEDURE — 77065 DX MAMMO INCL CAD UNI: CPT | Performed by: SURGERY

## 2019-05-28 ENCOUNTER — TELEPHONE (OUTPATIENT)
Dept: SURGERY | Facility: CLINIC | Age: 74
End: 2019-05-28

## 2019-05-30 ENCOUNTER — TELEPHONE (OUTPATIENT)
Dept: SURGERY | Facility: CLINIC | Age: 74
End: 2019-05-30

## 2019-05-30 NOTE — TELEPHONE ENCOUNTER
Spoke with patient about needing a follow up with Dr Liz Garcia. Pt states that her daughter schedules her appts,   Will have PSR call to arrange follow up with Dr Colin Espinoza.

## 2019-06-05 ENCOUNTER — MED REC SCAN ONLY (OUTPATIENT)
Dept: FAMILY MEDICINE CLINIC | Facility: CLINIC | Age: 74
End: 2019-06-05

## 2019-07-15 ENCOUNTER — OFFICE VISIT (OUTPATIENT)
Dept: FAMILY MEDICINE CLINIC | Facility: CLINIC | Age: 74
End: 2019-07-15
Payer: MEDICARE

## 2019-07-15 VITALS
SYSTOLIC BLOOD PRESSURE: 122 MMHG | RESPIRATION RATE: 16 BRPM | HEART RATE: 99 BPM | TEMPERATURE: 98 F | DIASTOLIC BLOOD PRESSURE: 66 MMHG | BODY MASS INDEX: 37 KG/M2 | OXYGEN SATURATION: 97 % | HEIGHT: 65.98 IN

## 2019-07-15 DIAGNOSIS — R32 URINARY INCONTINENCE, UNSPECIFIED TYPE: ICD-10-CM

## 2019-07-15 DIAGNOSIS — G54.6 PHANTOM LIMB PAIN (HCC): ICD-10-CM

## 2019-07-15 PROCEDURE — 99213 OFFICE O/P EST LOW 20 MIN: CPT | Performed by: FAMILY MEDICINE

## 2019-07-15 RX ORDER — HYDROCODONE BITARTRATE AND ACETAMINOPHEN 10; 325 MG/1; MG/1
1 TABLET ORAL EVERY 6 HOURS PRN
Qty: 40 TABLET | Refills: 0 | Status: SHIPPED
Start: 2019-07-15 | End: 2019-07-15

## 2019-07-15 RX ORDER — OXYBUTYNIN CHLORIDE 5 MG/1
5 TABLET, EXTENDED RELEASE ORAL DAILY
Qty: 90 TABLET | Refills: 3 | Status: SHIPPED | OUTPATIENT
Start: 2019-07-15 | End: 2019-10-10

## 2019-07-15 RX ORDER — HYDROCODONE BITARTRATE AND ACETAMINOPHEN 10; 325 MG/1; MG/1
1 TABLET ORAL EVERY 6 HOURS PRN
Qty: 40 TABLET | Refills: 0 | Status: SHIPPED | OUTPATIENT
Start: 2019-07-15 | End: 2020-10-05

## 2019-07-15 NOTE — PROGRESS NOTES
/66   Pulse 99   Temp 98.1 °F (36.7 °C) (Oral)   Resp 16   Ht 65.98\"   SpO2 97%   BMI 37.24 kg/m²               Patient presents with:  Medication Request: norco and bladder        HPI;    Jillian Beaulieu is a 69year old female.   Patient is here f Carbonate-Vitamin D 600-400 MG-UNIT Oral Tab Take 1 tablet by mouth 2 (two) times daily. Disp:  Rfl:    Multiple Vitamin (MULTI-VITAMIN DAILY) Oral Tab Take 1 tablet by mouth daily.  Disp:  Rfl:       Past Medical History:   Diagnosis Date   • Breast CA (HC Pain.    Urinary incontinence, unspecified type  -Refills given for 90-day supply     Oxybutynin Chloride ER 5 MG Oral Tablet 24 Hr; Take 1 tablet (5 mg total) by mouth daily.       Discussed with the patient regarding her overall health  Advised her to be

## 2019-09-05 ENCOUNTER — TELEPHONE (OUTPATIENT)
Dept: FAMILY MEDICINE CLINIC | Facility: CLINIC | Age: 74
End: 2019-09-05

## 2019-09-05 NOTE — TELEPHONE ENCOUNTER
Certification for parking placard/license plates was mailed to use for completed by dr Walt Saba / placed in bin for  to complete      Please call sabas at [phone] when completed

## 2019-10-10 ENCOUNTER — OFFICE VISIT (OUTPATIENT)
Dept: FAMILY MEDICINE CLINIC | Facility: CLINIC | Age: 74
End: 2019-10-10
Payer: MEDICARE

## 2019-10-10 ENCOUNTER — LAB ENCOUNTER (OUTPATIENT)
Dept: LAB | Age: 74
End: 2019-10-10
Attending: FAMILY MEDICINE
Payer: MEDICARE

## 2019-10-10 VITALS
WEIGHT: 230 LBS | HEIGHT: 66 IN | HEART RATE: 112 BPM | DIASTOLIC BLOOD PRESSURE: 60 MMHG | TEMPERATURE: 98 F | SYSTOLIC BLOOD PRESSURE: 96 MMHG | BODY MASS INDEX: 36.96 KG/M2 | RESPIRATION RATE: 19 BRPM | OXYGEN SATURATION: 92 %

## 2019-10-10 DIAGNOSIS — I25.10 CORONARY ARTERY DISEASE INVOLVING NATIVE HEART WITHOUT ANGINA PECTORIS, UNSPECIFIED VESSEL OR LESION TYPE: ICD-10-CM

## 2019-10-10 DIAGNOSIS — Z17.0 MALIGNANT NEOPLASM OF UPPER-OUTER QUADRANT OF LEFT BREAST IN FEMALE, ESTROGEN RECEPTOR POSITIVE (HCC): ICD-10-CM

## 2019-10-10 DIAGNOSIS — C50.412 MALIGNANT NEOPLASM OF UPPER-OUTER QUADRANT OF LEFT BREAST IN FEMALE, ESTROGEN RECEPTOR POSITIVE (HCC): ICD-10-CM

## 2019-10-10 DIAGNOSIS — R53.83 FATIGUE, UNSPECIFIED TYPE: ICD-10-CM

## 2019-10-10 DIAGNOSIS — D64.9 NORMOCYTIC ANEMIA: ICD-10-CM

## 2019-10-10 DIAGNOSIS — G54.6 PHANTOM LIMB PAIN (HCC): ICD-10-CM

## 2019-10-10 DIAGNOSIS — Z11.59 NEED FOR HEPATITIS C SCREENING TEST: ICD-10-CM

## 2019-10-10 DIAGNOSIS — G47.00 INSOMNIA, UNSPECIFIED TYPE: Primary | ICD-10-CM

## 2019-10-10 DIAGNOSIS — R32 URINARY INCONTINENCE, UNSPECIFIED TYPE: ICD-10-CM

## 2019-10-10 DIAGNOSIS — G47.00 INSOMNIA, UNSPECIFIED TYPE: ICD-10-CM

## 2019-10-10 PROCEDURE — 99214 OFFICE O/P EST MOD 30 MIN: CPT | Performed by: FAMILY MEDICINE

## 2019-10-10 PROCEDURE — 82306 VITAMIN D 25 HYDROXY: CPT

## 2019-10-10 PROCEDURE — 85025 COMPLETE CBC W/AUTO DIFF WBC: CPT

## 2019-10-10 PROCEDURE — 84443 ASSAY THYROID STIM HORMONE: CPT

## 2019-10-10 PROCEDURE — 80053 COMPREHEN METABOLIC PANEL: CPT

## 2019-10-10 PROCEDURE — 84439 ASSAY OF FREE THYROXINE: CPT

## 2019-10-10 PROCEDURE — 86803 HEPATITIS C AB TEST: CPT

## 2019-10-10 RX ORDER — OXYBUTYNIN CHLORIDE 5 MG/1
5 TABLET, EXTENDED RELEASE ORAL DAILY
Qty: 90 TABLET | Refills: 3 | Status: CANCELLED | OUTPATIENT
Start: 2019-10-10

## 2019-10-10 RX ORDER — OXYBUTYNIN CHLORIDE 10 MG/1
10 TABLET, EXTENDED RELEASE ORAL DAILY
Qty: 90 TABLET | Refills: 1 | Status: SHIPPED | OUTPATIENT
Start: 2019-10-10 | End: 2020-04-11

## 2019-10-10 RX ORDER — LETROZOLE 2.5 MG/1
2.5 TABLET, FILM COATED ORAL DAILY
Qty: 90 TABLET | Refills: 3 | Status: SHIPPED | OUTPATIENT
Start: 2019-10-10

## 2019-10-10 RX ORDER — DULOXETIN HYDROCHLORIDE 30 MG/1
CAPSULE, DELAYED RELEASE ORAL
Qty: 90 CAPSULE | Refills: 3 | Status: SHIPPED | OUTPATIENT
Start: 2019-10-10 | End: 2019-10-11

## 2019-10-10 RX ORDER — CHOLECALCIFEROL (VITAMIN D3) 25 MCG
1 TABLET,CHEWABLE ORAL DAILY
Qty: 30 CAPSULE | Refills: 3 | Status: SHIPPED | OUTPATIENT
Start: 2019-10-10 | End: 2020-02-11

## 2019-10-10 RX ORDER — GABAPENTIN 100 MG/1
CAPSULE ORAL
Qty: 90 CAPSULE | Refills: 3 | Status: SHIPPED | OUTPATIENT
Start: 2019-10-10 | End: 2020-02-11

## 2019-10-10 RX ORDER — ATORVASTATIN CALCIUM 10 MG/1
10 TABLET, FILM COATED ORAL NIGHTLY
Qty: 90 TABLET | Refills: 3 | Status: SHIPPED | OUTPATIENT
Start: 2019-10-10 | End: 2020-10-01

## 2019-10-11 ENCOUNTER — TELEPHONE (OUTPATIENT)
Dept: FAMILY MEDICINE CLINIC | Facility: CLINIC | Age: 74
End: 2019-10-11

## 2019-10-11 DIAGNOSIS — G54.6 PHANTOM LIMB PAIN (HCC): ICD-10-CM

## 2019-10-11 RX ORDER — DULOXETIN HYDROCHLORIDE 30 MG/1
30 CAPSULE, DELAYED RELEASE ORAL DAILY
Qty: 90 CAPSULE | Refills: 3 | Status: SHIPPED | OUTPATIENT
Start: 2019-10-11 | End: 2020-10-01

## 2019-10-11 RX ORDER — ERGOCALCIFEROL 1.25 MG/1
50000 CAPSULE ORAL WEEKLY
Qty: 4 CAPSULE | Refills: 6 | Status: SHIPPED | OUTPATIENT
Start: 2019-10-11 | End: 2020-04-24

## 2019-10-11 NOTE — PROGRESS NOTES
Very low vitamin D levels, prescription sent to the pharmacy for vitamin D capsules, 1 capsule p.o. weekly  Slightly elevated BUN and creatinine, patient is to push fluids  Normal thyroid profile

## 2019-10-11 NOTE — TELEPHONE ENCOUNTER
Namita Dominguez from [facility] called in regards to medication DULoxetine HCl 30 MG Oral Cap DR Particles, she said there is no directions on the prescription that was sent over . Transferred to triage voicemail .

## 2019-10-11 NOTE — PROGRESS NOTES
BP 96/60 (BP Location: Right arm, Patient Position: Sitting, Cuff Size: large)   Pulse 112   Temp 98.1 °F (36.7 °C) (Oral)   Resp 19   Ht 66\"   Wt 230 lb (104.3 kg)   SpO2 92%   BMI 37.12 kg/m²               Patient presents with:   Follow - Up: 3 months tablet by mouth every 6 (six) hours as needed for Pain. Disp: 40 tablet Rfl: 0   aspirin 81 MG Oral Tab Take 1 tablet (81 mg total) by mouth daily.  Disp: 30 tablet Rfl: 6   ferrous sulfate 325 (65 FE) MG Oral Tab EC Take 325 mg by mouth 2 (two) times daily bruits  LUNGS: clear to auscultation  CARDIO: RRR without murmur  GI: good BS's,no masses, HSM or tenderness left BKA  EXTREMITIES: no cyanosis, clubbing or edema    ASSESSMENT AND PLAN:   Diagnoses and all orders for this visit:    Insomnia, unspecified t indicates understanding of these issues and agrees to the plan.   Imaging & Consults:  None  Meds & Refills for this Visit:  Requested Prescriptions     Signed Prescriptions Disp Refills   • atorvastatin 10 MG Oral Tab 90 tablet 3     Sig: Take 1 tablet (10

## 2019-10-11 NOTE — TELEPHONE ENCOUNTER
Dr. Korin Kennedy,  Please advise    Medication Quantity Refills Start End   DULoxetine HCl 30 MG Oral Cap DR Particles 90 capsule 3 10/10/2019    Sig:   duloxetine 30 mg capsule,delayed release     Route:   (none)

## 2019-10-11 NOTE — TELEPHONE ENCOUNTER
Kemi from [facility] called and said the cbc could not be processed due to blood being clogged , she said they will call the patient and have a redrawn , she just wanted dr Nicole Bone to be notified .

## 2019-10-18 NOTE — TELEPHONE ENCOUNTER
Patient called back saying she has no way of getting in to have the labs drawn again due to no ride .  She is wanting to know what to do ???

## 2019-10-18 NOTE — TELEPHONE ENCOUNTER
Dr. Ese Huber,  Please advise    Last CBC 4/25/19  Normal  Hgb   15.5  Hct    46.7  WBC  7.4  RBC   4.82    Do you want home care to arrange to have redrawn?

## 2019-10-25 ENCOUNTER — PATIENT OUTREACH (OUTPATIENT)
Dept: CASE MANAGEMENT | Age: 74
End: 2019-10-25

## 2019-10-29 ENCOUNTER — PATIENT OUTREACH (OUTPATIENT)
Dept: CASE MANAGEMENT | Age: 74
End: 2019-10-29

## 2019-11-05 ENCOUNTER — PATIENT OUTREACH (OUTPATIENT)
Dept: CASE MANAGEMENT | Age: 74
End: 2019-11-05

## 2019-11-05 NOTE — PROGRESS NOTES
Patient called back states her daughter takes care of all her health needs and does not feel she is in need of the program at this time. Patient aware she may reach out to me at a later date if she changes her mind or if she has any additional questions.

## 2019-11-05 NOTE — PROGRESS NOTES
Attempted to call patient for Assessment call. No answer left message for patient to call back at her convenience.

## 2020-01-30 ENCOUNTER — OFFICE VISIT (OUTPATIENT)
Dept: FAMILY MEDICINE CLINIC | Facility: CLINIC | Age: 75
End: 2020-01-30
Payer: MEDICARE

## 2020-01-30 VITALS
SYSTOLIC BLOOD PRESSURE: 110 MMHG | OXYGEN SATURATION: 94 % | DIASTOLIC BLOOD PRESSURE: 80 MMHG | TEMPERATURE: 97 F | RESPIRATION RATE: 14 BRPM | BODY MASS INDEX: 37 KG/M2 | HEIGHT: 66 IN | HEART RATE: 94 BPM

## 2020-01-30 DIAGNOSIS — R05.3 CHRONIC COUGH: ICD-10-CM

## 2020-01-30 DIAGNOSIS — E78.2 MIXED HYPERLIPIDEMIA: Primary | ICD-10-CM

## 2020-01-30 DIAGNOSIS — J44.9 CHRONIC OBSTRUCTIVE PULMONARY DISEASE, UNSPECIFIED COPD TYPE (HCC): ICD-10-CM

## 2020-01-30 DIAGNOSIS — I73.9 PERIPHERAL VASCULAR DISEASE OF EXTREMITY WITH CLAUDICATION (HCC): ICD-10-CM

## 2020-01-30 PROBLEM — I70.245 ATHEROSCLEROSIS OF NATIVE ARTERIES OF LEFT LEG WITH ULCERATION OF OTHER PART OF FOOT (HCC): Status: ACTIVE | Noted: 2020-01-30

## 2020-01-30 PROCEDURE — 99213 OFFICE O/P EST LOW 20 MIN: CPT | Performed by: FAMILY MEDICINE

## 2020-01-31 NOTE — PROGRESS NOTES
/80   Pulse 94   Temp 97.3 °F (36.3 °C) (Temporal)   Resp 14   Ht 66\"   SpO2 94%   BMI 37.12 kg/m²               Patient presents with: Follow - Up       HPI;    Etienne Joe is a 69year old female.   Who has past medical history significant fo Oral Tab EC Take 325 mg by mouth 2 (two) times daily with meals. • Calcium Carbonate-Vitamin D 600-400 MG-UNIT Oral Tab Take 1 tablet by mouth 2 (two) times daily. • Multiple Vitamin (MULTI-VITAMIN DAILY) Oral Tab Take 1 tablet by mouth daily. Future    Chronic obstructive pulmonary disease, unspecified COPD type ([facility] [address]  I will get a chest x-ray on her  She possibly has emphysema  Patient will think about it and will get chest x-ray    Peripheral vascular disease of extremity with claudication (H

## 2020-02-11 DIAGNOSIS — G54.6 PHANTOM LIMB PAIN (HCC): ICD-10-CM

## 2020-02-11 RX ORDER — GABAPENTIN 100 MG/1
CAPSULE ORAL
Qty: 90 CAPSULE | Refills: 0 | Status: SHIPPED | OUTPATIENT
Start: 2020-02-11 | End: 2020-10-05

## 2020-02-11 RX ORDER — MELATONIN
Qty: 30 TABLET | Refills: 0 | Status: SHIPPED | OUTPATIENT
Start: 2020-02-11 | End: 2020-03-17

## 2020-03-16 DIAGNOSIS — G54.6 PHANTOM LIMB PAIN (HCC): ICD-10-CM

## 2020-03-17 RX ORDER — MELATONIN
Qty: 30 TABLET | Refills: 0 | Status: SHIPPED | OUTPATIENT
Start: 2020-03-17

## 2020-03-17 NOTE — TELEPHONE ENCOUNTER
Name from pharmacy: Vitamin B-12 1,000 Mcg Tab Natu         Will file in chart as: VITAMIN B-12 1000 MCG Oral Tab         Sig: TAKE ONE TABLET BY MOUTH ONE TIME DAILY     Disp:  30 tablet    Refills:  0    Start: 3/16/2020    Class: Normal    Non-formular

## 2020-04-11 DIAGNOSIS — R32 URINARY INCONTINENCE, UNSPECIFIED TYPE: ICD-10-CM

## 2020-04-11 RX ORDER — OXYBUTYNIN CHLORIDE 10 MG/1
TABLET, EXTENDED RELEASE ORAL
Qty: 90 TABLET | Refills: 0 | Status: SHIPPED | OUTPATIENT
Start: 2020-04-11 | End: 2020-07-10

## 2020-04-20 ENCOUNTER — TELEPHONE (OUTPATIENT)
Dept: FAMILY MEDICINE CLINIC | Facility: CLINIC | Age: 75
End: 2020-04-20

## 2020-04-20 NOTE — TELEPHONE ENCOUNTER
Pt's daughter called, reschedule her AWV from 4-27-20 to June 15th. Daughter states Pt will need med refills prior to that. Pt was seen by Dr Phoenix Alex 1-30-20. Pt does not have MyChart or a cell phone so video call cannot be done.   Please let us know if

## 2020-05-22 NOTE — PROGRESS NOTES
Khalif Ariza is a 38year old male. HPI:   See answers to questions above.      Current Outpatient Medications   Medication Sig Dispense Refill   • azithromycin 250 MG Oral Tab Take 2 tablets (500 mg total) by mouth daily for 1 day, THEN 1 tablet (250 MONE HOSPITALIST  Progress Note     Cam Speck Patient Status:  Inpatient    3/1/1945 MRN [id number]   [facility] 7NE-A Attending Bozena Choudhury MD   Hosp Day # 1 PCP No primary care provider on file.      Chief Complaint: left foot pain 4.65 yo smoker p/w left foot pain-sf and politeal arteris long segment of occlusion-dx angio  2. Hx of copd  3.hx of dvt  4.  Echo pending  Plan of care: as above    Quality:  · DVT Prophylaxis: heparin  · CODE status: full  · Kessler: no  · Central line:

## 2020-07-08 DIAGNOSIS — R32 URINARY INCONTINENCE, UNSPECIFIED TYPE: ICD-10-CM

## 2020-07-10 RX ORDER — OXYBUTYNIN CHLORIDE 10 MG/1
TABLET, EXTENDED RELEASE ORAL
Qty: 90 TABLET | Refills: 0 | Status: SHIPPED | OUTPATIENT
Start: 2020-07-10 | End: 2020-10-06

## 2020-07-10 NOTE — TELEPHONE ENCOUNTER
LOV 1/30/2020    LAST LAB    LAST RX 4-11-20 90*0    Next OV No future appointments.     PROTOCOL     Name from pharmacy: Oxybutynin Chloride Er 24hr 10 Mg Tab Zydu         Will file in chart as: OXYBUTYNIN CHLORIDE ER 10 MG Oral Tablet 24 Hr         Sig: T

## 2020-08-11 NOTE — CONSULTS
[facility]  Report of Psychiatric Consultation    Islericky Archibald Patient Status:  Inpatient    3/1/1945 MRN [id number]   [facility] 3SW-A Attending Kelsy Lagos MD   Hosp Day # 4 PCP No primary care provider on file.      Date of Ad . Raised her 2 children on her own. 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     Past Medical History:   Diagnosis Date   • CAD (coronary artery disease)    • Cataract     bilateral   • COPD (chronic injection 0.08 mg, 0.08 mg, Intravenous, Q5 Min PRN  •  Nalbuphine HCl (NUBAIN) injection 2.5 mg, 2.5 mg, Intravenous, Q4H PRN  •  0.9%  NaCl infusion, , Intravenous, Continuous  •  Heparin Sodium (Porcine) 5000 UNIT/ML injection 5,000 Units, 5,000 Units, Destruction After The Procedure: No

## 2020-10-01 DIAGNOSIS — G54.6 PHANTOM LIMB PAIN (HCC): ICD-10-CM

## 2020-10-01 DIAGNOSIS — I25.10 CORONARY ARTERY DISEASE INVOLVING NATIVE HEART WITHOUT ANGINA PECTORIS, UNSPECIFIED VESSEL OR LESION TYPE: ICD-10-CM

## 2020-10-01 RX ORDER — DULOXETIN HYDROCHLORIDE 30 MG/1
CAPSULE, DELAYED RELEASE ORAL
Qty: 90 CAPSULE | Refills: 0 | Status: SHIPPED | OUTPATIENT
Start: 2020-10-01 | End: 2021-01-05

## 2020-10-01 RX ORDER — ATORVASTATIN CALCIUM 10 MG/1
10 TABLET, FILM COATED ORAL NIGHTLY
Qty: 90 TABLET | Refills: 0 | Status: SHIPPED | OUTPATIENT
Start: 2020-10-01 | End: 2021-01-05

## 2020-10-01 NOTE — TELEPHONE ENCOUNTER
LOV 1/30/2020    LAST LAB 4/25/2019    LAST RX 10/10/2019 90 tablet 3 refills    Next OV No future appointments.       PROTOCOL  Cholesterol Medication Protocol Qfkcea45/01/2020 03:37 AM   Lipid panel within past 12 months Protocol Details    ALT < 80     A

## 2020-10-01 NOTE — TELEPHONE ENCOUNTER
LOV 1/30/2002    LAST LAB n/a     LAST RX 10/11/2019 90 capsule 3 refills    Next OV No future appointments.       PROTOCOL n/a    Name from pharmacy: Duloxetine Hydrochloride Ec 30 Mg Cap Risi          Will file in chart as: DULOXETINE HCL 30 MG Oral Cap D

## 2020-10-01 NOTE — TELEPHONE ENCOUNTER
Pt scheduled phone visit.    Future Appointments   Date Time Provider Luna Quiñonesi   10/5/2020 11:00 AM Miri Nicole MD [facility]

## 2020-10-01 NOTE — TELEPHONE ENCOUNTER
I am refilling the medicine now but it is been 10 months since I have seen her   if she does not want to come to the office we can do a video call

## 2020-10-05 ENCOUNTER — VIRTUAL PHONE E/M (OUTPATIENT)
Dept: FAMILY MEDICINE CLINIC | Facility: CLINIC | Age: 75
End: 2020-10-05
Payer: MEDICARE

## 2020-10-05 VITALS — HEIGHT: 66 IN | BODY MASS INDEX: 36.96 KG/M2 | WEIGHT: 230 LBS

## 2020-10-05 DIAGNOSIS — S78.112A ABOVE KNEE AMPUTATION OF LEFT LOWER EXTREMITY (HCC): ICD-10-CM

## 2020-10-05 DIAGNOSIS — R32 URINARY INCONTINENCE, UNSPECIFIED TYPE: ICD-10-CM

## 2020-10-05 DIAGNOSIS — J44.9 CHRONIC OBSTRUCTIVE PULMONARY DISEASE, UNSPECIFIED COPD TYPE (HCC): ICD-10-CM

## 2020-10-05 DIAGNOSIS — Z09 NEED FOR FOLLOW-UP BY HOME HEALTH SERVICE: ICD-10-CM

## 2020-10-05 DIAGNOSIS — I25.10 CORONARY ARTERY DISEASE INVOLVING NATIVE HEART WITHOUT ANGINA PECTORIS, UNSPECIFIED VESSEL OR LESION TYPE: Primary | ICD-10-CM

## 2020-10-05 DIAGNOSIS — I73.9 PERIPHERAL VASCULAR DISEASE OF EXTREMITY WITH CLAUDICATION (HCC): ICD-10-CM

## 2020-10-05 PROCEDURE — 99442 PHONE E/M BY PHYS 11-20 MIN: CPT | Performed by: FAMILY MEDICINE

## 2020-10-05 NOTE — PROGRESS NOTES
Virtual/Telephone Check-In    [facility] verbally consents to a Virtual/Telephone  Patient understands and accepts financial responsibility for any deductible, co-insurance and/or co-pays associated with this service.     Duration of the service: 15 m

## 2020-10-06 DIAGNOSIS — R32 URINARY INCONTINENCE, UNSPECIFIED TYPE: ICD-10-CM

## 2020-10-06 RX ORDER — OXYBUTYNIN CHLORIDE 10 MG/1
TABLET, EXTENDED RELEASE ORAL
Qty: 90 TABLET | Refills: 0 | Status: SHIPPED | OUTPATIENT
Start: 2020-10-06 | End: 2021-01-05

## 2020-10-06 NOTE — TELEPHONE ENCOUNTER
LOV 1/30/2020    LAST LAB    LAST RX 7-10-20 90*0    Next OV No future appointments.     PROTOCOL  Name from pharmacy: Oxybutynin Chloride Er 24hr 10 Mg Tab Zydu          Will file in chart as: OXYBUTYNIN CHLORIDE ER 10 MG Oral Tablet 24 Hr    Sig: TAKE ONE

## 2021-01-05 DIAGNOSIS — I25.10 CORONARY ARTERY DISEASE INVOLVING NATIVE HEART WITHOUT ANGINA PECTORIS, UNSPECIFIED VESSEL OR LESION TYPE: ICD-10-CM

## 2021-01-05 DIAGNOSIS — R32 URINARY INCONTINENCE, UNSPECIFIED TYPE: ICD-10-CM

## 2021-01-05 DIAGNOSIS — G54.6 PHANTOM LIMB PAIN (HCC): ICD-10-CM

## 2021-01-05 RX ORDER — OXYBUTYNIN CHLORIDE 10 MG/1
TABLET, EXTENDED RELEASE ORAL
Qty: 90 TABLET | Refills: 0 | Status: SHIPPED | OUTPATIENT
Start: 2021-01-05 | End: 2021-04-07

## 2021-01-05 RX ORDER — DULOXETIN HYDROCHLORIDE 30 MG/1
CAPSULE, DELAYED RELEASE ORAL
Qty: 90 CAPSULE | Refills: 0 | Status: SHIPPED | OUTPATIENT
Start: 2021-01-05 | End: 2021-04-07

## 2021-01-05 RX ORDER — ATORVASTATIN CALCIUM 10 MG/1
10 TABLET, FILM COATED ORAL NIGHTLY
Qty: 90 TABLET | Refills: 0 | Status: SHIPPED | OUTPATIENT
Start: 2021-01-05 | End: 2021-04-07

## 2021-01-05 NOTE — TELEPHONE ENCOUNTER
LOV 10/5/2020    LAST LAB 10/10/2019    LAST RX   OXYBUTYNIN CHLORIDE ER 10 MG Oral Tablet 24 Hr 90 tablet 0 10/6/2020     ATORVASTATIN 10 MG Oral Tab 90 tablet 0 10/1/2020     DULOXETINE HCL 30 MG Oral Cap DR Particles 90 capsule 0 10/1/2020           Nex

## 2021-01-26 DIAGNOSIS — Z23 NEED FOR VACCINATION: ICD-10-CM

## 2021-02-08 ENCOUNTER — IMMUNIZATION (OUTPATIENT)
Dept: LAB | Age: 76
End: 2021-02-08
Attending: HOSPITALIST
Payer: MEDICARE

## 2021-02-08 DIAGNOSIS — Z23 NEED FOR VACCINATION: Primary | ICD-10-CM

## 2021-02-08 PROCEDURE — 0001A SARSCOV2 VAC 30MCG/0.3ML IM: CPT

## 2021-03-01 ENCOUNTER — IMMUNIZATION (OUTPATIENT)
Dept: LAB | Age: 76
End: 2021-03-01
Attending: HOSPITALIST
Payer: MEDICARE

## 2021-03-01 DIAGNOSIS — Z23 NEED FOR VACCINATION: Primary | ICD-10-CM

## 2021-03-01 PROCEDURE — 0002A SARSCOV2 VAC 30MCG/0.3ML IM: CPT

## 2021-03-25 ENCOUNTER — TELEPHONE (OUTPATIENT)
Dept: FAMILY MEDICINE CLINIC | Facility: CLINIC | Age: 76
End: 2021-03-25

## 2021-03-25 DIAGNOSIS — J44.9 CHRONIC OBSTRUCTIVE PULMONARY DISEASE, UNSPECIFIED COPD TYPE (HCC): Primary | ICD-10-CM

## 2021-03-25 NOTE — TELEPHONE ENCOUNTER
Pt's daughter Kierra Black called stating Pt has an appointment on 21. Wants to know if Dr would re-enter the imaging order from 2020 that has . Imaging never done due to Covid. Wants to get it done prior to the 21 blake.

## 2021-03-25 NOTE — TELEPHONE ENCOUNTER
LOV: 1/30/20  Chronic obstructive pulmonary disease, unspecified COPD type ([facility] [address]  I will get a chest x-ray on her  She possibly has emphysema  Patient will think about it and will get chest x-ray     XR CHEST DECUBITUS BILAT INCL CHEST AP/PA(3 VIEWS)(CPT=7

## 2021-03-26 NOTE — TELEPHONE ENCOUNTER
Called and spoke with pt's daughter, Chaparro Ragsdale (Shannan Castellanos per HIPAA), to inform per PCP indicated CXR re-ordered as requested. Confirmed CS#. No further questions or concerns. Daughter verbalized understanding and agreed with POC.

## 2021-03-31 ENCOUNTER — HOSPITAL ENCOUNTER (OUTPATIENT)
Dept: GENERAL RADIOLOGY | Facility: HOSPITAL | Age: 76
Discharge: HOME | End: 2021-03-31
Attending: FAMILY MEDICINE
Payer: MEDICARE

## 2021-03-31 DIAGNOSIS — J44.9 CHRONIC OBSTRUCTIVE PULMONARY DISEASE, UNSPECIFIED COPD TYPE (HCC): ICD-10-CM

## 2021-03-31 PROCEDURE — 71046 X-RAY EXAM CHEST 2 VIEWS: CPT | Performed by: FAMILY MEDICINE

## 2021-04-05 DIAGNOSIS — G54.6 PHANTOM LIMB PAIN (HCC): ICD-10-CM

## 2021-04-05 DIAGNOSIS — R32 URINARY INCONTINENCE, UNSPECIFIED TYPE: ICD-10-CM

## 2021-04-05 DIAGNOSIS — I25.10 CORONARY ARTERY DISEASE INVOLVING NATIVE HEART WITHOUT ANGINA PECTORIS, UNSPECIFIED VESSEL OR LESION TYPE: ICD-10-CM

## 2021-04-05 NOTE — TELEPHONE ENCOUNTER
LOV 10/5/2020    Future Appointments   Date Time Provider Luna Dhillon   4/7/2021 12:30 PM Pascual Dockery MD [facility]

## 2021-04-07 ENCOUNTER — PATIENT OUTREACH (OUTPATIENT)
Dept: CASE MANAGEMENT | Age: 76
End: 2021-04-07

## 2021-04-07 ENCOUNTER — OFFICE VISIT (OUTPATIENT)
Dept: FAMILY MEDICINE CLINIC | Facility: CLINIC | Age: 76
End: 2021-04-07
Payer: MEDICARE

## 2021-04-07 VITALS
WEIGHT: 274 LBS | SYSTOLIC BLOOD PRESSURE: 124 MMHG | HEART RATE: 97 BPM | TEMPERATURE: 97 F | HEIGHT: 66 IN | DIASTOLIC BLOOD PRESSURE: 80 MMHG | OXYGEN SATURATION: 93 % | RESPIRATION RATE: 20 BRPM | BODY MASS INDEX: 44.03 KG/M2

## 2021-04-07 DIAGNOSIS — Z00.00 ENCOUNTER FOR ANNUAL HEALTH EXAMINATION: Primary | ICD-10-CM

## 2021-04-07 DIAGNOSIS — Z87.891 PERSONAL HISTORY OF TOBACCO USE, PRESENTING HAZARDS TO HEALTH: ICD-10-CM

## 2021-04-07 DIAGNOSIS — E78.2 MIXED HYPERLIPIDEMIA: ICD-10-CM

## 2021-04-07 DIAGNOSIS — J44.9 CHRONIC OBSTRUCTIVE PULMONARY DISEASE, UNSPECIFIED COPD TYPE (HCC): ICD-10-CM

## 2021-04-07 DIAGNOSIS — I25.10 CORONARY ARTERY DISEASE INVOLVING NATIVE HEART WITHOUT ANGINA PECTORIS, UNSPECIFIED VESSEL OR LESION TYPE: ICD-10-CM

## 2021-04-07 DIAGNOSIS — F17.210 CIGARETTE SMOKER: ICD-10-CM

## 2021-04-07 DIAGNOSIS — Z12.11 SCREENING FOR COLON CANCER: ICD-10-CM

## 2021-04-07 DIAGNOSIS — R32 URINARY INCONTINENCE, UNSPECIFIED TYPE: ICD-10-CM

## 2021-04-07 DIAGNOSIS — G54.6 PHANTOM LIMB PAIN (HCC): ICD-10-CM

## 2021-04-07 PROCEDURE — 99406 BEHAV CHNG SMOKING 3-10 MIN: CPT | Performed by: FAMILY MEDICINE

## 2021-04-07 PROCEDURE — G0439 PPPS, SUBSEQ VISIT: HCPCS | Performed by: FAMILY MEDICINE

## 2021-04-07 RX ORDER — OXYBUTYNIN CHLORIDE 10 MG/1
10 TABLET, EXTENDED RELEASE ORAL DAILY
Qty: 90 TABLET | Refills: 3 | Status: SHIPPED | OUTPATIENT
Start: 2021-04-07

## 2021-04-07 RX ORDER — OXYBUTYNIN CHLORIDE 10 MG/1
TABLET, EXTENDED RELEASE ORAL
Qty: 90 TABLET | Refills: 0 | Status: CANCELLED | OUTPATIENT
Start: 2021-04-07

## 2021-04-07 RX ORDER — DULOXETIN HYDROCHLORIDE 30 MG/1
CAPSULE, DELAYED RELEASE ORAL
Qty: 90 CAPSULE | Refills: 0 | Status: CANCELLED | OUTPATIENT
Start: 2021-04-07

## 2021-04-07 RX ORDER — ATORVASTATIN CALCIUM 10 MG/1
TABLET, FILM COATED ORAL
Qty: 90 TABLET | Refills: 0 | Status: CANCELLED | OUTPATIENT
Start: 2021-04-07

## 2021-04-07 RX ORDER — DEXAMETHASONE 4 MG/1
1 TABLET ORAL 2 TIMES DAILY
Qty: 3 CAN | Refills: 3 | Status: SHIPPED | OUTPATIENT
Start: 2021-04-07 | End: 2022-04-02

## 2021-04-07 RX ORDER — DULOXETIN HYDROCHLORIDE 30 MG/1
30 CAPSULE, DELAYED RELEASE ORAL DAILY
Qty: 90 CAPSULE | Refills: 2 | Status: SHIPPED | OUTPATIENT
Start: 2021-04-07 | End: 2022-01-06

## 2021-04-07 RX ORDER — ATORVASTATIN CALCIUM 10 MG/1
10 TABLET, FILM COATED ORAL NIGHTLY
Qty: 90 TABLET | Refills: 3 | Status: SHIPPED | OUTPATIENT
Start: 2021-04-07

## 2021-04-07 NOTE — PROGRESS NOTES
HPI:   Jay Oliva is a 77year old female who presents for a Medicare Subsequent Annual Wellness visit (Pt already had Initial Annual Wellness).     Patient with history of COPD, coronary artery disease, peripheral vascular disease as well as left Advanced Directive:  She has a Living Will on file in [employer]. She has a Power of  for [employer] on file in [employer].     She currently smokes tobacco.  Social History    Tobacco Use      Smoking status: Current Every Day Smoker        Packs/day: 0. 10/10/2019    ALT 16 10/10/2019    CA 10.9 (H) 10/10/2019    ALB 3.5 10/10/2019    TSH 0.670 10/10/2019    CREATSERUM 1.10 (H) 10/10/2019     (H) 10/10/2019        CBC  (most recent labs)   Lab Results   Component Value Date    WBC 6.3 07/16/2018 Cancer (age of onset: 61) in her sister; Cancer (age of onset: 79) in her father; Diabetes in her mother. SOCIAL HISTORY:   She  reports that she has been smoking. She has a 14.00 pack-year smoking history.  She has never used smokeless tobacco. She repor edema  NEURO: Oriented times three, cranial nerves are intact, motor and sensory are grossly intact    Vaccination History     Immunization History   Administered Date(s) Administered   • Covid-19 Vaccine Pfizer 30 mcg/0.3 ml 02/08/2021, 03/01/2021   Pende cancer  -     OCCULT BLOOD, FECAL, IMMUNOASSAY; Future      Other orders  -     PNEUMOCOCCAL IMM (PNEUMOVAX)  -     Fluticasone Propionate HFA (FLOVENT HFA) 110 MCG/ACT Inhalation Aerosol; Inhale 1 puff into the lungs 2 (two) times daily.          Diet asse flowsheet data found. Glaucoma Screening      Ophthalmology Visit Annually: Diabetics, FHx Glaucoma, AA>50, > 65 No flowsheet data found. Bone Density Screening      Dexascan Every two years No results found for this or any previous visit. Testing Annually Spirometry date:  No flowsheet data found.             Template: FERNY RODRIGUEZ MEDICARE ANNUAL ASSESSMENT FEMALE [80021]

## 2021-04-16 ENCOUNTER — PATIENT OUTREACH (OUTPATIENT)
Dept: CASE MANAGEMENT | Age: 76
End: 2021-04-16

## 2021-04-16 NOTE — PROGRESS NOTES
Spoke with patient regarding the CCM service program patient stated she is unable to talk at the moment however would like the information to be mailed to her.  Patient aware I will follow up in a couple weeks once she has had time to review the details and

## 2021-05-04 ENCOUNTER — PATIENT OUTREACH (OUTPATIENT)
Dept: CASE MANAGEMENT | Age: 76
End: 2021-05-04

## 2021-05-04 NOTE — PROGRESS NOTES
Attempted contacting pt to intro [facility] services with no answer. Will continue contacting to discuss.

## 2021-07-15 NOTE — TELEPHONE ENCOUNTER
Name from pharmacy: Oxybutynin Chloride Er 24hr 10 Mg Tab Zydu          Will file in chart as: OXYBUTYNIN CHLORIDE ER 10 MG Oral Tablet 24 Hr         Sig: TAKE ONE TABLET BY MOUTH ONE TIME DAILY     Disp:  90 tablet    Refills:  0    Start: 4/11/2020    Cl Detail Level: Zone Detail Level: Simple

## 2021-10-28 ENCOUNTER — TELEPHONE (OUTPATIENT)
Dept: FAMILY MEDICINE CLINIC | Facility: CLINIC | Age: 76
End: 2021-10-28

## 2022-01-06 DIAGNOSIS — G54.6 PHANTOM LIMB PAIN (HCC): ICD-10-CM

## 2022-01-06 RX ORDER — DULOXETIN HYDROCHLORIDE 30 MG/1
CAPSULE, DELAYED RELEASE ORAL
Qty: 90 CAPSULE | Refills: 0 | Status: SHIPPED | OUTPATIENT
Start: 2022-01-06

## 2022-01-06 NOTE — TELEPHONE ENCOUNTER
DULoxetine HCl 30 MG Oral Cap DR Particles 90 capsule 2 4/7/2021     LOV 4/7/2021    No future appointments.

## 2022-04-04 RX ORDER — ATORVASTATIN CALCIUM 10 MG/1
TABLET, FILM COATED ORAL
Qty: 90 TABLET | Refills: 0 | OUTPATIENT
Start: 2022-04-04

## 2022-04-04 RX ORDER — OXYBUTYNIN CHLORIDE 10 MG/1
TABLET, EXTENDED RELEASE ORAL
Qty: 90 TABLET | Refills: 0 | OUTPATIENT
Start: 2022-04-04

## 2022-04-04 RX ORDER — DULOXETIN HYDROCHLORIDE 30 MG/1
CAPSULE, DELAYED RELEASE ORAL
Qty: 90 CAPSULE | Refills: 0 | OUTPATIENT
Start: 2022-04-04

## 2022-06-10 ENCOUNTER — TELEPHONE (OUTPATIENT)
Dept: FAMILY MEDICINE CLINIC | Facility: CLINIC | Age: 77
End: 2022-06-10

## 2022-06-13 NOTE — TELEPHONE ENCOUNTER
Reached patient for medication adherence consult. Per insurance report, patient is past due for refill on atorvastatin. Patient tells me she is out of the medication and has NOT been able to get a refill. She has not been to see her PCP in quite some time and has not had labs drawn since 2019. Did provide education, discussed indication and stressed the importance of taking the atorvastatin consistently everyday to get the most benefit. Asked if I could transfer her to PCP office to schedule appt. Patient tells me her daughter needs to schedule her appts because she is her transportation. Did strongly encourage patient to contact daughter and schedule appt as soon as able because do not want her to be without her medication for much longer. Patient confirmed understanding and will discuss with daughter today.

## 2022-08-29 ENCOUNTER — MED REC SCAN ONLY (OUTPATIENT)
Dept: FAMILY MEDICINE CLINIC | Facility: CLINIC | Age: 77
End: 2022-08-29

## 2023-01-01 ENCOUNTER — APPOINTMENT (OUTPATIENT)
Dept: GENERAL RADIOLOGY | Facility: HOSPITAL | Age: 78
DRG: 542 | End: 2023-01-01
Attending: EMERGENCY MEDICINE

## 2023-01-01 ENCOUNTER — HOSPITAL ENCOUNTER (INPATIENT)
Facility: HOSPITAL | Age: 78
LOS: 2 days | Discharge: HOSPICE | DRG: 542 | End: 2023-01-01
Attending: EMERGENCY MEDICINE | Admitting: HOSPITALIST

## 2023-01-01 ENCOUNTER — APPOINTMENT (OUTPATIENT)
Dept: HEMATOLOGY/ONCOLOGY | Facility: HOSPITAL | Age: 78
End: 2023-01-01
Attending: INTERNAL MEDICINE
Payer: MEDICARE

## 2023-01-01 ENCOUNTER — APPOINTMENT (OUTPATIENT)
Dept: CT IMAGING | Facility: HOSPITAL | Age: 78
DRG: 542 | End: 2023-01-01
Attending: INTERNAL MEDICINE

## 2023-01-01 ENCOUNTER — HOSPITAL ENCOUNTER (INPATIENT)
Facility: HOSPITAL | Age: 78
LOS: 13 days | DRG: 542 | End: 2023-01-01
Attending: HOSPITALIST | Admitting: HOSPITALIST

## 2023-01-01 VITALS
RESPIRATION RATE: 10 BRPM | HEART RATE: 126 BPM | SYSTOLIC BLOOD PRESSURE: 62 MMHG | OXYGEN SATURATION: 83 % | TEMPERATURE: 102 F | DIASTOLIC BLOOD PRESSURE: 39 MMHG

## 2023-01-01 VITALS
OXYGEN SATURATION: 98 % | DIASTOLIC BLOOD PRESSURE: 71 MMHG | HEIGHT: 66 IN | SYSTOLIC BLOOD PRESSURE: 159 MMHG | HEART RATE: 68 BPM | TEMPERATURE: 98 F | BODY MASS INDEX: 24.41 KG/M2 | RESPIRATION RATE: 18 BRPM | WEIGHT: 151.88 LBS

## 2023-01-01 DIAGNOSIS — C79.51 PAIN FROM BONE METASTASES (HCC): ICD-10-CM

## 2023-01-01 DIAGNOSIS — M89.9 BONE LESION: ICD-10-CM

## 2023-01-01 DIAGNOSIS — R63.0 ANOREXIC: ICD-10-CM

## 2023-01-01 DIAGNOSIS — E83.52 HYPERCALCEMIA: ICD-10-CM

## 2023-01-01 DIAGNOSIS — R53.1 WEAKNESS GENERALIZED: Primary | ICD-10-CM

## 2023-01-01 DIAGNOSIS — G89.3 PAIN FROM BONE METASTASES (HCC): ICD-10-CM

## 2023-01-01 DIAGNOSIS — G89.29 OTHER CHRONIC PAIN: ICD-10-CM

## 2023-01-01 DIAGNOSIS — K59.00 CONSTIPATION, UNSPECIFIED CONSTIPATION TYPE: ICD-10-CM

## 2023-01-01 LAB
ALBUMIN SERPL ELPH-MCNC: 3.18 G/DL (ref 3.75–5.21)
ALBUMIN SERPL-MCNC: 3.2 G/DL (ref 3.4–5)
ALBUMIN/GLOB SERPL: 0.7 {RATIO} (ref 1–2)
ALBUMIN/GLOB SERPL: 1.02 {RATIO} (ref 1–2)
ALP LIVER SERPL-CCNC: 223 U/L
ALPHA1 GLOB SERPL ELPH-MCNC: 0.45 G/DL (ref 0.19–0.46)
ALPHA2 GLOB SERPL ELPH-MCNC: 1.01 G/DL (ref 0.48–1.05)
ALT SERPL-CCNC: 25 U/L
ANION GAP SERPL CALC-SCNC: 6 MMOL/L (ref 0–18)
ANION GAP SERPL CALC-SCNC: 7 MMOL/L (ref 0–18)
ANION GAP SERPL CALC-SCNC: 8 MMOL/L (ref 0–18)
AST SERPL-CCNC: 57 U/L (ref 15–37)
ATRIAL RATE: 105 BPM
B-GLOBULIN SERPL ELPH-MCNC: 0.76 G/DL (ref 0.68–1.23)
B2 MICROGLOB SERPL-MCNC: 0.28 MG/DL (ref 0.11–0.25)
BASOPHILS # BLD AUTO: 0.02 X10(3) UL (ref 0–0.2)
BASOPHILS # BLD AUTO: 0.06 X10(3) UL (ref 0–0.2)
BASOPHILS # BLD AUTO: 0.07 X10(3) UL (ref 0–0.2)
BASOPHILS NFR BLD AUTO: 0.3 %
BASOPHILS NFR BLD AUTO: 0.6 %
BASOPHILS NFR BLD AUTO: 0.6 %
BILIRUB SERPL-MCNC: 0.7 MG/DL (ref 0.1–2)
BILIRUB UR QL STRIP.AUTO: NEGATIVE
BRCA1 C.185DELAG BLD/T QL: >450 U/ML (ref ?–38)
BUN BLD-MCNC: 15 MG/DL (ref 7–18)
BUN BLD-MCNC: 21 MG/DL (ref 7–18)
BUN BLD-MCNC: 27 MG/DL (ref 7–18)
CALCIUM BLD-MCNC: 10.9 MG/DL (ref 8.5–10.1)
CALCIUM BLD-MCNC: 11.5 MG/DL (ref 8.5–10.1)
CALCIUM BLD-MCNC: 9.7 MG/DL (ref 8.5–10.1)
CHLORIDE SERPL-SCNC: 103 MMOL/L (ref 98–112)
CHLORIDE SERPL-SCNC: 105 MMOL/L (ref 98–112)
CHLORIDE SERPL-SCNC: 106 MMOL/L (ref 98–112)
CO2 SERPL-SCNC: 24 MMOL/L (ref 21–32)
CO2 SERPL-SCNC: 25 MMOL/L (ref 21–32)
CO2 SERPL-SCNC: 27 MMOL/L (ref 21–32)
COLOR UR AUTO: YELLOW
CREAT BLD-MCNC: 0.55 MG/DL
CREAT BLD-MCNC: 0.63 MG/DL
CREAT BLD-MCNC: 0.85 MG/DL
EGFRCR SERPLBLD CKD-EPI 2021: 71 ML/MIN/1.73M2 (ref 60–?)
EGFRCR SERPLBLD CKD-EPI 2021: 91 ML/MIN/1.73M2 (ref 60–?)
EGFRCR SERPLBLD CKD-EPI 2021: 94 ML/MIN/1.73M2 (ref 60–?)
EOSINOPHIL # BLD AUTO: 0 X10(3) UL (ref 0–0.7)
EOSINOPHIL # BLD AUTO: 0.02 X10(3) UL (ref 0–0.7)
EOSINOPHIL # BLD AUTO: 0.04 X10(3) UL (ref 0–0.7)
EOSINOPHIL NFR BLD AUTO: 0 %
EOSINOPHIL NFR BLD AUTO: 0.2 %
EOSINOPHIL NFR BLD AUTO: 0.4 %
ERYTHROCYTE [DISTWIDTH] IN BLOOD BY AUTOMATED COUNT: 12.2 %
ERYTHROCYTE [DISTWIDTH] IN BLOOD BY AUTOMATED COUNT: 12.3 %
ERYTHROCYTE [DISTWIDTH] IN BLOOD BY AUTOMATED COUNT: 12.4 %
GAMMA GLOB SERPL ELPH-MCNC: 0.89 G/DL (ref 0.62–1.7)
GLOBULIN PLAS-MCNC: 4.5 G/DL (ref 2.8–4.4)
GLUCOSE BLD-MCNC: 125 MG/DL (ref 70–99)
GLUCOSE BLD-MCNC: 73 MG/DL (ref 70–99)
GLUCOSE BLD-MCNC: 82 MG/DL (ref 70–99)
GLUCOSE UR STRIP.AUTO-MCNC: NORMAL MG/DL
HCT VFR BLD AUTO: 35.5 %
HCT VFR BLD AUTO: 36.2 %
HCT VFR BLD AUTO: 40.3 %
HGB BLD-MCNC: 11.5 G/DL
HGB BLD-MCNC: 11.8 G/DL
HGB BLD-MCNC: 13.2 G/DL
HYALINE CASTS #/AREA URNS AUTO: PRESENT /LPF
IGA SERPL-MCNC: 235 MG/DL (ref 70–312)
IGM SERPL-MCNC: 41.1 MG/DL (ref 43–279)
IMM GRANULOCYTES # BLD AUTO: 0.04 X10(3) UL (ref 0–1)
IMM GRANULOCYTES # BLD AUTO: 0.04 X10(3) UL (ref 0–1)
IMM GRANULOCYTES # BLD AUTO: 0.05 X10(3) UL (ref 0–1)
IMM GRANULOCYTES NFR BLD: 0.3 %
IMM GRANULOCYTES NFR BLD: 0.4 %
IMM GRANULOCYTES NFR BLD: 0.8 %
IMMUNOGLOBULIN PNL SER-MCNC: 930 MG/DL (ref 791–1643)
KAPPA LC FREE SER-MCNC: 3.44 MG/DL (ref 0.33–1.94)
KAPPA LC FREE/LAMBDA FREE SER NEPH: 1.51 {RATIO} (ref 0.26–1.65)
KETONES UR STRIP.AUTO-MCNC: 20 MG/DL
LAMBDA LC FREE SERPL-MCNC: 2.28 MG/DL (ref 0.57–2.63)
LDH SERPL L TO P-CCNC: 186 U/L
LEUKOCYTE ESTERASE UR QL STRIP.AUTO: 500
LYMPHOCYTES # BLD AUTO: 0.85 X10(3) UL (ref 1–4)
LYMPHOCYTES # BLD AUTO: 1.66 X10(3) UL (ref 1–4)
LYMPHOCYTES # BLD AUTO: 1.75 X10(3) UL (ref 1–4)
LYMPHOCYTES NFR BLD AUTO: 12.8 %
LYMPHOCYTES NFR BLD AUTO: 14.3 %
LYMPHOCYTES NFR BLD AUTO: 16.7 %
MAGNESIUM SERPL-MCNC: 2.4 MG/DL (ref 1.6–2.6)
MCH RBC QN AUTO: 31.8 PG (ref 26–34)
MCH RBC QN AUTO: 31.9 PG (ref 26–34)
MCH RBC QN AUTO: 32 PG (ref 26–34)
MCHC RBC AUTO-ENTMCNC: 32.4 G/DL (ref 31–37)
MCHC RBC AUTO-ENTMCNC: 32.6 G/DL (ref 31–37)
MCHC RBC AUTO-ENTMCNC: 32.8 G/DL (ref 31–37)
MCV RBC AUTO: 97.1 FL
MCV RBC AUTO: 98.1 FL
MCV RBC AUTO: 98.3 FL
MONOCYTES # BLD AUTO: 0.21 X10(3) UL (ref 0.1–1)
MONOCYTES # BLD AUTO: 0.64 X10(3) UL (ref 0.1–1)
MONOCYTES # BLD AUTO: 0.76 X10(3) UL (ref 0.1–1)
MONOCYTES NFR BLD AUTO: 3.2 %
MONOCYTES NFR BLD AUTO: 5.2 %
MONOCYTES NFR BLD AUTO: 7.6 %
NEUTROPHILS # BLD AUTO: 5.51 X10 (3) UL (ref 1.5–7.7)
NEUTROPHILS # BLD AUTO: 5.51 X10(3) UL (ref 1.5–7.7)
NEUTROPHILS # BLD AUTO: 7.39 X10 (3) UL (ref 1.5–7.7)
NEUTROPHILS # BLD AUTO: 7.39 X10(3) UL (ref 1.5–7.7)
NEUTROPHILS # BLD AUTO: 9.72 X10 (3) UL (ref 1.5–7.7)
NEUTROPHILS # BLD AUTO: 9.72 X10(3) UL (ref 1.5–7.7)
NEUTROPHILS NFR BLD AUTO: 74.3 %
NEUTROPHILS NFR BLD AUTO: 79.4 %
NEUTROPHILS NFR BLD AUTO: 82.9 %
NITRITE UR QL STRIP.AUTO: NEGATIVE
OSMOLALITY SERPL CALC.SUM OF ELEC: 284 MOSM/KG (ref 275–295)
OSMOLALITY SERPL CALC.SUM OF ELEC: 286 MOSM/KG (ref 275–295)
OSMOLALITY SERPL CALC.SUM OF ELEC: 290 MOSM/KG (ref 275–295)
P AXIS: 56 DEGREES
P-R INTERVAL: 158 MS
PH UR STRIP.AUTO: 5 [PH] (ref 5–8)
PLATELET # BLD AUTO: 228 10(3)UL (ref 150–450)
PLATELET # BLD AUTO: 256 10(3)UL (ref 150–450)
PLATELET # BLD AUTO: 304 10(3)UL (ref 150–450)
POTASSIUM SERPL-SCNC: 4.1 MMOL/L (ref 3.5–5.1)
POTASSIUM SERPL-SCNC: 4.4 MMOL/L (ref 3.5–5.1)
POTASSIUM SERPL-SCNC: 4.8 MMOL/L (ref 3.5–5.1)
PROT SERPL-MCNC: 6.3 G/DL (ref 6.4–8.2)
PROT SERPL-MCNC: 7.7 G/DL (ref 6.4–8.2)
PROT UR STRIP.AUTO-MCNC: NEGATIVE MG/DL
PTH-INTACT SERPL-MCNC: 37.2 PG/ML (ref 18.5–88)
Q-T INTERVAL: 358 MS
QRS DURATION: 82 MS
QTC CALCULATION (BEZET): 473 MS
R AXIS: 40 DEGREES
RBC # BLD AUTO: 3.61 X10(6)UL
RBC # BLD AUTO: 3.69 X10(6)UL
RBC # BLD AUTO: 4.15 X10(6)UL
RBC #/AREA URNS AUTO: >10 /HPF
RBC UR QL AUTO: NEGATIVE
SARS-COV-2 RNA RESP QL NAA+PROBE: NOT DETECTED
SODIUM SERPL-SCNC: 136 MMOL/L (ref 136–145)
SODIUM SERPL-SCNC: 136 MMOL/L (ref 136–145)
SODIUM SERPL-SCNC: 139 MMOL/L (ref 136–145)
SP GR UR STRIP.AUTO: 1.02 (ref 1–1.03)
T AXIS: 73 DEGREES
UROBILINOGEN UR STRIP.AUTO-MCNC: NORMAL MG/DL
VENTRICULAR RATE: 105 BPM
WBC # BLD AUTO: 10 X10(3) UL (ref 4–11)
WBC # BLD AUTO: 12.2 X10(3) UL (ref 4–11)
WBC # BLD AUTO: 6.6 X10(3) UL (ref 4–11)
WBC #/AREA URNS AUTO: >50 /HPF

## 2023-01-01 PROCEDURE — 99231 SBSQ HOSP IP/OBS SF/LOW 25: CPT | Performed by: HOSPITALIST

## 2023-01-01 PROCEDURE — 99498 ADVNCD CARE PLAN ADDL 30 MIN: CPT | Performed by: STUDENT IN AN ORGANIZED HEALTH CARE EDUCATION/TRAINING PROGRAM

## 2023-01-01 PROCEDURE — 99497 ADVNCD CARE PLAN 30 MIN: CPT | Performed by: STUDENT IN AN ORGANIZED HEALTH CARE EDUCATION/TRAINING PROGRAM

## 2023-01-01 PROCEDURE — 99223 1ST HOSP IP/OBS HIGH 75: CPT | Performed by: HOSPITALIST

## 2023-01-01 PROCEDURE — 99232 SBSQ HOSP IP/OBS MODERATE 35: CPT | Performed by: HOSPITALIST

## 2023-01-01 PROCEDURE — 99232 SBSQ HOSP IP/OBS MODERATE 35: CPT | Performed by: INTERNAL MEDICINE

## 2023-01-01 PROCEDURE — 99232 SBSQ HOSP IP/OBS MODERATE 35: CPT | Performed by: STUDENT IN AN ORGANIZED HEALTH CARE EDUCATION/TRAINING PROGRAM

## 2023-01-01 PROCEDURE — 71260 CT THORAX DX C+: CPT | Performed by: INTERNAL MEDICINE

## 2023-01-01 PROCEDURE — 74177 CT ABD & PELVIS W/CONTRAST: CPT | Performed by: INTERNAL MEDICINE

## 2023-01-01 PROCEDURE — 72100 X-RAY EXAM L-S SPINE 2/3 VWS: CPT | Performed by: EMERGENCY MEDICINE

## 2023-01-01 PROCEDURE — 99233 SBSQ HOSP IP/OBS HIGH 50: CPT | Performed by: STUDENT IN AN ORGANIZED HEALTH CARE EDUCATION/TRAINING PROGRAM

## 2023-01-01 PROCEDURE — 71045 X-RAY EXAM CHEST 1 VIEW: CPT | Performed by: EMERGENCY MEDICINE

## 2023-01-01 PROCEDURE — 99239 HOSP IP/OBS DSCHRG MGMT >30: CPT | Performed by: STUDENT IN AN ORGANIZED HEALTH CARE EDUCATION/TRAINING PROGRAM

## 2023-01-01 PROCEDURE — 99223 1ST HOSP IP/OBS HIGH 75: CPT | Performed by: INTERNAL MEDICINE

## 2023-01-01 PROCEDURE — 99223 1ST HOSP IP/OBS HIGH 75: CPT | Performed by: STUDENT IN AN ORGANIZED HEALTH CARE EDUCATION/TRAINING PROGRAM

## 2023-01-01 PROCEDURE — 99233 SBSQ HOSP IP/OBS HIGH 50: CPT | Performed by: INTERNAL MEDICINE

## 2023-01-01 PROCEDURE — 74018 RADEX ABDOMEN 1 VIEW: CPT | Performed by: EMERGENCY MEDICINE

## 2023-01-01 PROCEDURE — 99233 SBSQ HOSP IP/OBS HIGH 50: CPT | Performed by: HOSPITALIST

## 2023-01-01 RX ORDER — HYDROMORPHONE HYDROCHLORIDE 1 MG/ML
0.5 INJECTION, SOLUTION INTRAMUSCULAR; INTRAVENOUS; SUBCUTANEOUS EVERY 30 MIN PRN
Status: DISCONTINUED | OUTPATIENT
Start: 2023-01-01 | End: 2023-01-01

## 2023-01-01 RX ORDER — POLYETHYLENE GLYCOL 3350 17 G/17G
17 POWDER, FOR SOLUTION ORAL DAILY PRN
Status: DISCONTINUED | OUTPATIENT
Start: 2023-01-01 | End: 2023-01-01

## 2023-01-01 RX ORDER — BISACODYL 10 MG
10 SUPPOSITORY, RECTAL RECTAL ONCE
Status: COMPLETED | OUTPATIENT
Start: 2023-01-01 | End: 2023-01-01

## 2023-01-01 RX ORDER — ONDANSETRON 2 MG/ML
4 INJECTION INTRAMUSCULAR; INTRAVENOUS EVERY 6 HOURS PRN
Status: DISCONTINUED | OUTPATIENT
Start: 2023-01-01 | End: 2023-01-01

## 2023-01-01 RX ORDER — ENOXAPARIN SODIUM 100 MG/ML
40 INJECTION SUBCUTANEOUS DAILY
Status: DISCONTINUED | OUTPATIENT
Start: 2023-01-01 | End: 2023-01-01

## 2023-01-01 RX ORDER — MORPHINE SULFATE 2 MG/ML
2 INJECTION, SOLUTION INTRAMUSCULAR; INTRAVENOUS EVERY 2 HOUR PRN
Status: DISCONTINUED | OUTPATIENT
Start: 2023-01-01 | End: 2023-01-01

## 2023-01-01 RX ORDER — LIDOCAINE HYDROCHLORIDE 10 MG/ML
5 INJECTION, SOLUTION EPIDURAL; INFILTRATION; INTRACAUDAL; PERINEURAL
Status: COMPLETED | OUTPATIENT
Start: 2023-01-01 | End: 2023-01-01

## 2023-01-01 RX ORDER — LORAZEPAM 2 MG/ML
1 INJECTION INTRAMUSCULAR EVERY 4 HOURS PRN
Status: DISCONTINUED | OUTPATIENT
Start: 2023-01-01 | End: 2023-01-01

## 2023-01-01 RX ORDER — MORPHINE SULFATE 4 MG/ML
4 INJECTION, SOLUTION INTRAMUSCULAR; INTRAVENOUS EVERY 2 HOUR PRN
Status: DISCONTINUED | OUTPATIENT
Start: 2023-01-01 | End: 2023-01-01

## 2023-01-01 RX ORDER — DEXAMETHASONE SODIUM PHOSPHATE 4 MG/ML
4 VIAL (ML) INJECTION EVERY 6 HOURS
Status: DISCONTINUED | OUTPATIENT
Start: 2023-01-01 | End: 2023-01-01

## 2023-01-01 RX ORDER — LIDOCAINE 50 MG/G
OINTMENT TOPICAL 2 TIMES DAILY
COMMUNITY

## 2023-01-01 RX ORDER — BISACODYL 10 MG
10 SUPPOSITORY, RECTAL RECTAL
Status: DISCONTINUED | OUTPATIENT
Start: 2023-01-01 | End: 2023-01-01

## 2023-01-01 RX ORDER — LORAZEPAM 0.5 MG/1
TABLET ORAL EVERY 4 HOURS PRN
Status: DISCONTINUED | OUTPATIENT
Start: 2023-01-01 | End: 2023-01-01

## 2023-01-01 RX ORDER — GABAPENTIN 300 MG/1
300 CAPSULE ORAL NIGHTLY
COMMUNITY

## 2023-01-01 RX ORDER — CALCIUM CARBONATE 500 MG/1
1 TABLET, CHEWABLE ORAL 2 TIMES DAILY
COMMUNITY

## 2023-01-01 RX ORDER — MORPHINE SULFATE 4 MG/ML
4 INJECTION, SOLUTION INTRAMUSCULAR; INTRAVENOUS EVERY 2 HOUR PRN
Status: CANCELLED | OUTPATIENT
Start: 2023-01-01

## 2023-01-01 RX ORDER — METOCLOPRAMIDE HYDROCHLORIDE 5 MG/ML
10 INJECTION INTRAMUSCULAR; INTRAVENOUS EVERY 8 HOURS PRN
Status: DISCONTINUED | OUTPATIENT
Start: 2023-01-01 | End: 2023-01-01

## 2023-01-01 RX ORDER — NITROFURANTOIN 25; 75 MG/1; MG/1
100 CAPSULE ORAL 2 TIMES DAILY
Status: DISCONTINUED | OUTPATIENT
Start: 2023-01-01 | End: 2023-01-01

## 2023-01-01 RX ORDER — FUROSEMIDE 40 MG/1
40 TABLET ORAL EVERY 8 HOURS PRN
Status: DISCONTINUED | OUTPATIENT
Start: 2023-01-01 | End: 2023-01-01

## 2023-01-01 RX ORDER — FUROSEMIDE 10 MG/ML
40 INJECTION INTRAMUSCULAR; INTRAVENOUS EVERY 8 HOURS PRN
Status: DISCONTINUED | OUTPATIENT
Start: 2023-01-01 | End: 2023-01-01

## 2023-01-01 RX ORDER — MORPHINE SULFATE 2 MG/ML
1 INJECTION, SOLUTION INTRAMUSCULAR; INTRAVENOUS
Status: DISCONTINUED | OUTPATIENT
Start: 2023-01-01 | End: 2023-01-01

## 2023-01-01 RX ORDER — ACETAMINOPHEN 650 MG/1
650 SUPPOSITORY RECTAL EVERY 4 HOURS PRN
Status: DISCONTINUED | OUTPATIENT
Start: 2023-01-01 | End: 2023-01-01

## 2023-01-01 RX ORDER — FAMOTIDINE 20 MG/1
20 TABLET, FILM COATED ORAL DAILY
Status: DISCONTINUED | OUTPATIENT
Start: 2023-01-01 | End: 2023-01-01

## 2023-01-01 RX ORDER — MELATONIN
3 NIGHTLY PRN
Status: DISCONTINUED | OUTPATIENT
Start: 2023-01-01 | End: 2023-01-01

## 2023-01-01 RX ORDER — GLYCOPYRROLATE 0.2 MG/ML
0.4 INJECTION, SOLUTION INTRAMUSCULAR; INTRAVENOUS
Status: DISCONTINUED | OUTPATIENT
Start: 2023-01-01 | End: 2023-01-01

## 2023-01-01 RX ORDER — ATROPINE SULFATE 10 MG/ML
2 SOLUTION/ DROPS OPHTHALMIC EVERY 2 HOUR PRN
Status: DISCONTINUED | OUTPATIENT
Start: 2023-01-01 | End: 2023-01-01

## 2023-01-01 RX ORDER — LORAZEPAM 2 MG/ML
1 INJECTION INTRAMUSCULAR EVERY 4 HOURS
Status: DISCONTINUED | OUTPATIENT
Start: 2023-01-01 | End: 2023-01-01

## 2023-01-01 RX ORDER — SENNOSIDES 8.6 MG
17.2 TABLET ORAL NIGHTLY PRN
Status: DISCONTINUED | OUTPATIENT
Start: 2023-01-01 | End: 2023-01-01

## 2023-01-01 RX ORDER — ASPIRIN 81 MG/1
81 TABLET, CHEWABLE ORAL DAILY
Status: DISCONTINUED | OUTPATIENT
Start: 2023-01-01 | End: 2023-01-01

## 2023-01-01 RX ORDER — ALBUTEROL SULFATE 90 UG/1
2 AEROSOL, METERED RESPIRATORY (INHALATION) EVERY 6 HOURS PRN
Status: DISCONTINUED | OUTPATIENT
Start: 2023-01-01 | End: 2023-01-01

## 2023-01-01 RX ORDER — SODIUM PHOSPHATE,MONO-DIBASIC 19G-7G/118
1 ENEMA (ML) RECTAL ONCE AS NEEDED
Status: ACTIVE | OUTPATIENT
Start: 2023-01-01 | End: 2023-01-01

## 2023-01-01 RX ORDER — SCOLOPAMINE TRANSDERMAL SYSTEM 1 MG/1
1 PATCH, EXTENDED RELEASE TRANSDERMAL
Status: DISCONTINUED | OUTPATIENT
Start: 2023-01-01 | End: 2023-01-01

## 2023-01-01 RX ORDER — GABAPENTIN 300 MG/1
300 CAPSULE ORAL NIGHTLY
Status: DISCONTINUED | OUTPATIENT
Start: 2023-01-01 | End: 2023-01-01

## 2023-01-01 RX ORDER — DEXAMETHASONE SODIUM PHOSPHATE 4 MG/ML
4 VIAL (ML) INJECTION EVERY 6 HOURS
Status: CANCELLED | OUTPATIENT
Start: 2023-01-01

## 2023-01-01 RX ORDER — MORPHINE SULFATE 2 MG/ML
1 INJECTION, SOLUTION INTRAMUSCULAR; INTRAVENOUS EVERY 2 HOUR PRN
Status: DISCONTINUED | OUTPATIENT
Start: 2023-01-01 | End: 2023-01-01

## 2023-01-01 RX ORDER — SENNOSIDES 8.6 MG
8.6 TABLET ORAL 2 TIMES DAILY
Status: DISCONTINUED | OUTPATIENT
Start: 2023-01-01 | End: 2023-01-01

## 2023-01-01 RX ORDER — MULTIPLE VITAMINS W/ MINERALS TAB 9MG-400MCG
1 TAB ORAL DAILY
Status: DISCONTINUED | OUTPATIENT
Start: 2023-01-01 | End: 2023-01-01

## 2023-01-01 RX ORDER — POLYETHYLENE GLYCOL 3350 17 G/17G
17 POWDER, FOR SOLUTION ORAL DAILY
Status: DISCONTINUED | OUTPATIENT
Start: 2023-01-01 | End: 2023-01-01

## 2023-01-01 RX ORDER — LORAZEPAM 2 MG/ML
2 INJECTION INTRAMUSCULAR EVERY 4 HOURS PRN
Status: DISCONTINUED | OUTPATIENT
Start: 2023-01-01 | End: 2023-01-01

## 2023-01-01 RX ORDER — ALBUTEROL SULFATE 90 UG/1
AEROSOL, METERED RESPIRATORY (INHALATION) EVERY 6 HOURS PRN
COMMUNITY

## 2023-01-01 RX ORDER — MORPHINE SULFATE 2 MG/ML
2 INJECTION, SOLUTION INTRAMUSCULAR; INTRAVENOUS EVERY 2 HOUR PRN
Status: CANCELLED | OUTPATIENT
Start: 2023-01-01

## 2023-01-01 RX ORDER — ONDANSETRON 4 MG/1
4 TABLET, ORALLY DISINTEGRATING ORAL EVERY 6 HOURS PRN
Status: DISCONTINUED | OUTPATIENT
Start: 2023-01-01 | End: 2023-01-01

## 2023-01-01 RX ORDER — FAMOTIDINE 20 MG/1
20 TABLET, FILM COATED ORAL 2 TIMES DAILY
Status: DISCONTINUED | OUTPATIENT
Start: 2023-01-01 | End: 2023-01-01

## 2023-01-01 RX ORDER — MORPHINE SULFATE 15 MG/1
15 TABLET, FILM COATED, EXTENDED RELEASE ORAL EVERY 12 HOURS
Status: DISCONTINUED | OUTPATIENT
Start: 2023-01-01 | End: 2023-01-01

## 2023-01-01 RX ORDER — ACETAMINOPHEN 160 MG/5ML
650 SOLUTION ORAL EVERY 4 HOURS PRN
Status: DISCONTINUED | OUTPATIENT
Start: 2023-01-01 | End: 2023-01-01

## 2023-01-01 RX ORDER — ACETAMINOPHEN 500 MG
500 TABLET ORAL EVERY 4 HOURS PRN
Status: DISCONTINUED | OUTPATIENT
Start: 2023-01-01 | End: 2023-01-01

## 2023-01-01 RX ORDER — SODIUM CHLORIDE 9 MG/ML
125 INJECTION, SOLUTION INTRAVENOUS CONTINUOUS
Status: DISCONTINUED | OUTPATIENT
Start: 2023-01-01 | End: 2023-01-01

## 2023-01-01 RX ORDER — MORPHINE SULFATE 2 MG/ML
1 INJECTION, SOLUTION INTRAMUSCULAR; INTRAVENOUS EVERY 2 HOUR PRN
Status: CANCELLED | OUTPATIENT
Start: 2023-01-01

## 2023-01-01 RX ORDER — SODIUM CHLORIDE 0.9 % (FLUSH) 0.9 %
10 SYRINGE (ML) INJECTION AS NEEDED
Status: DISCONTINUED | OUTPATIENT
Start: 2023-01-01 | End: 2023-01-01

## 2023-01-01 RX ORDER — LORAZEPAM 0.5 MG/1
0.5 TABLET ORAL ONCE
Status: COMPLETED | OUTPATIENT
Start: 2023-01-01 | End: 2023-01-01

## 2023-03-01 ENCOUNTER — OFFICE VISIT (OUTPATIENT)
Dept: FAMILY MEDICINE CLINIC | Facility: CLINIC | Age: 78
End: 2023-03-01
Payer: MEDICARE

## 2023-03-01 VITALS
DIASTOLIC BLOOD PRESSURE: 84 MMHG | SYSTOLIC BLOOD PRESSURE: 126 MMHG | WEIGHT: 236 LBS | RESPIRATION RATE: 18 BRPM | BODY MASS INDEX: 38 KG/M2 | OXYGEN SATURATION: 93 % | HEART RATE: 105 BPM | TEMPERATURE: 99 F

## 2023-03-01 DIAGNOSIS — F17.219 CIGARETTE NICOTINE DEPENDENCE WITH NICOTINE-INDUCED DISORDER: ICD-10-CM

## 2023-03-01 DIAGNOSIS — S78.112A ABOVE KNEE AMPUTATION OF LEFT LOWER EXTREMITY (HCC): ICD-10-CM

## 2023-03-01 DIAGNOSIS — Z00.00 ENCOUNTER FOR ANNUAL HEALTH EXAMINATION: Primary | ICD-10-CM

## 2023-03-01 DIAGNOSIS — I73.9 PERIPHERAL VASCULAR DISEASE OF EXTREMITY WITH CLAUDICATION (HCC): ICD-10-CM

## 2023-03-01 DIAGNOSIS — R32 URINARY INCONTINENCE, UNSPECIFIED TYPE: ICD-10-CM

## 2023-03-01 DIAGNOSIS — G89.29 CHRONIC RIGHT-SIDED THORACIC BACK PAIN: ICD-10-CM

## 2023-03-01 DIAGNOSIS — M54.6 CHRONIC RIGHT-SIDED THORACIC BACK PAIN: ICD-10-CM

## 2023-03-01 DIAGNOSIS — M32.9 SYSTEMIC LUPUS ERYTHEMATOSUS, UNSPECIFIED SLE TYPE, UNSPECIFIED ORGAN INVOLVEMENT STATUS (HCC): ICD-10-CM

## 2023-03-01 DIAGNOSIS — K21.9 GASTROESOPHAGEAL REFLUX DISEASE WITHOUT ESOPHAGITIS: ICD-10-CM

## 2023-03-01 DIAGNOSIS — C50.412 MALIGNANT NEOPLASM OF UPPER-OUTER QUADRANT OF LEFT BREAST IN FEMALE, ESTROGEN RECEPTOR POSITIVE (HCC): ICD-10-CM

## 2023-03-01 DIAGNOSIS — Z17.0 MALIGNANT NEOPLASM OF UPPER-OUTER QUADRANT OF LEFT BREAST IN FEMALE, ESTROGEN RECEPTOR POSITIVE (HCC): ICD-10-CM

## 2023-03-01 DIAGNOSIS — Z89.512 STATUS POST BELOW-KNEE AMPUTATION OF LEFT LOWER EXTREMITY (HCC): ICD-10-CM

## 2023-03-01 DIAGNOSIS — J44.9 CHRONIC OBSTRUCTIVE PULMONARY DISEASE, UNSPECIFIED COPD TYPE (HCC): ICD-10-CM

## 2023-03-01 DIAGNOSIS — E78.2 MIXED HYPERLIPIDEMIA: ICD-10-CM

## 2023-03-01 DIAGNOSIS — G54.6 PHANTOM LIMB PAIN (HCC): ICD-10-CM

## 2023-03-01 PROCEDURE — G0439 PPPS, SUBSEQ VISIT: HCPCS | Performed by: FAMILY MEDICINE

## 2023-03-01 PROCEDURE — 99497 ADVNCD CARE PLAN 30 MIN: CPT | Performed by: FAMILY MEDICINE

## 2023-03-01 RX ORDER — HYDROCODONE BITARTRATE AND ACETAMINOPHEN 10; 325 MG/1; MG/1
1 TABLET ORAL EVERY 6 HOURS PRN
Qty: 40 TABLET | Refills: 0 | Status: SHIPPED | OUTPATIENT
Start: 2023-03-01

## 2023-03-01 RX ORDER — FLUTICASONE PROPIONATE 110 UG/1
1 AEROSOL, METERED RESPIRATORY (INHALATION) 2 TIMES DAILY
Qty: 3 EACH | Refills: 3 | Status: SHIPPED | OUTPATIENT
Start: 2023-03-01 | End: 2024-02-24

## 2023-03-03 ENCOUNTER — TELEPHONE (OUTPATIENT)
Dept: ADMINISTRATIVE | Age: 78
End: 2023-03-03

## 2023-03-03 NOTE — TELEPHONE ENCOUNTER
Pt called to obtain information for Glenny Alvarez referred to.  Gianni call to discuss  [phone]

## 2023-03-06 NOTE — TELEPHONE ENCOUNTER
Belkys calling from [facility]- stated they received referral for home health services however, they do not accept pts. Insurance. Please advise.

## 2023-03-14 NOTE — TELEPHONE ENCOUNTER
[phone]  Lm for pt (Ralph Mosley per HIPAA) to inform [facility] notified us they do not accept pt's insurance. Please contact insurance to inquire of [facility] facilities covered within pt's plan. To call back at the office with information and we will generate new [facility] referral or if any further questions.

## 2023-03-22 DIAGNOSIS — G54.6 PHANTOM LIMB PAIN (HCC): ICD-10-CM

## 2023-03-22 RX ORDER — HYDROCODONE BITARTRATE AND ACETAMINOPHEN 10; 325 MG/1; MG/1
1 TABLET ORAL EVERY 6 HOURS PRN
Qty: 40 TABLET | Refills: 0 | Status: SHIPPED | OUTPATIENT
Start: 2023-03-22

## 2023-03-22 NOTE — TELEPHONE ENCOUNTER
Last office visit: (if over 1 year, schedule appointment) 3/1/23    Appointment scheduled with: none    Requested medication: HYDROcodone-acetaminophen  MG Oral Tab    Pharmacy: [facility] on file

## 2023-04-06 DIAGNOSIS — G54.6 PHANTOM LIMB PAIN (HCC): ICD-10-CM

## 2023-04-06 RX ORDER — HYDROCODONE BITARTRATE AND ACETAMINOPHEN 10; 325 MG/1; MG/1
1 TABLET ORAL EVERY 6 HOURS PRN
Qty: 40 TABLET | Refills: 0 | Status: CANCELLED | OUTPATIENT
Start: 2023-04-06

## 2023-04-06 NOTE — TELEPHONE ENCOUNTER
Unfortunately we cannot go up on this medication  My advice would be to take an additional ibuprofen 200 mg with the Norco but make sure that she takes it after food

## 2023-04-06 NOTE — TELEPHONE ENCOUNTER
Pt calling asking for refill on medication. States her pain level has been a 9-10 lately. Wondering if  can increase dosage.  Please advise      HYDROcodone-acetaminophen  MG Oral Tab    [id number] [place] - [address] [place] [address], [phone]

## 2023-04-12 RX ORDER — HYDROCODONE BITARTRATE AND ACETAMINOPHEN 10; 325 MG/1; MG/1
1 TABLET ORAL EVERY 6 HOURS PRN
Qty: 40 TABLET | Refills: 0 | Status: SHIPPED | OUTPATIENT
Start: 2023-04-12

## 2023-04-17 ENCOUNTER — MED REC SCAN ONLY (OUTPATIENT)
Dept: FAMILY MEDICINE CLINIC | Facility: CLINIC | Age: 78
End: 2023-04-17

## 2023-05-08 ENCOUNTER — MED REC SCAN ONLY (OUTPATIENT)
Dept: FAMILY MEDICINE CLINIC | Facility: CLINIC | Age: 78
End: 2023-05-08

## 2023-05-14 ENCOUNTER — PATIENT MESSAGE (OUTPATIENT)
Dept: FAMILY MEDICINE CLINIC | Facility: CLINIC | Age: 78
End: 2023-05-14

## 2023-05-15 ENCOUNTER — TELEPHONE (OUTPATIENT)
Dept: FAMILY MEDICINE CLINIC | Facility: CLINIC | Age: 78
End: 2023-05-15

## 2023-05-15 DIAGNOSIS — G54.6 PHANTOM LIMB PAIN (HCC): ICD-10-CM

## 2023-05-15 RX ORDER — HYDROCODONE BITARTRATE AND ACETAMINOPHEN 10; 325 MG/1; MG/1
1 TABLET ORAL EVERY 6 HOURS PRN
Qty: 40 TABLET | Refills: 0 | Status: SHIPPED | OUTPATIENT
Start: 2023-05-15

## 2023-05-15 NOTE — TELEPHONE ENCOUNTER
From: Hazel Moreno  To: Macario Hanks MD  Sent: 5/14/2023 3:01 PM CDT  Subject: Back pain    Dear Dr Kit Oropeza,  I am still in a lot of pain in my back. The new wheelchair has helped a little bit by I am still in excruciating pain. I am wondering if there is anything I can do to figure out what is wrong. Thank you for your time.    Onelia

## 2023-05-15 NOTE — TELEPHONE ENCOUNTER
Called, patient daughter answered and said she will call us back. Called home phone - Spoke with patient, informed script sen to pharmacy. No further action needed.

## 2023-06-13 DIAGNOSIS — G54.6 PHANTOM LIMB PAIN (HCC): ICD-10-CM

## 2023-06-13 RX ORDER — HYDROCODONE BITARTRATE AND ACETAMINOPHEN 10; 325 MG/1; MG/1
1 TABLET ORAL EVERY 6 HOURS PRN
Qty: 40 TABLET | Refills: 0 | Status: SHIPPED | OUTPATIENT
Start: 2023-06-13

## 2023-07-17 DIAGNOSIS — G54.6 PHANTOM LIMB PAIN (HCC): ICD-10-CM

## 2023-07-17 RX ORDER — HYDROCODONE BITARTRATE AND ACETAMINOPHEN 10; 325 MG/1; MG/1
1 TABLET ORAL EVERY 6 HOURS PRN
Qty: 40 TABLET | Refills: 0 | Status: SHIPPED | OUTPATIENT
Start: 2023-07-17

## 2023-07-31 ENCOUNTER — HOSPITAL ENCOUNTER (EMERGENCY)
Facility: HOSPITAL | Age: 78
Discharge: NURSING, REHAB OR LONG-TERM CARE | End: 2023-07-31
Attending: EMERGENCY MEDICINE
Payer: MEDICARE

## 2023-07-31 ENCOUNTER — APPOINTMENT (OUTPATIENT)
Dept: CT IMAGING | Facility: HOSPITAL | Age: 78
End: 2023-07-31
Attending: EMERGENCY MEDICINE
Payer: MEDICARE

## 2023-07-31 ENCOUNTER — APPOINTMENT (OUTPATIENT)
Dept: GENERAL RADIOLOGY | Facility: HOSPITAL | Age: 78
End: 2023-07-31
Payer: MEDICARE

## 2023-07-31 VITALS
HEART RATE: 99 BPM | SYSTOLIC BLOOD PRESSURE: 165 MMHG | HEIGHT: 66 IN | WEIGHT: 180 LBS | RESPIRATION RATE: 18 BRPM | DIASTOLIC BLOOD PRESSURE: 85 MMHG | TEMPERATURE: 98 F | OXYGEN SATURATION: 94 % | BODY MASS INDEX: 28.93 KG/M2

## 2023-07-31 DIAGNOSIS — M19.019 ARTHRITIS OF SHOULDER: Primary | ICD-10-CM

## 2023-07-31 DIAGNOSIS — M25.511 RIGHT SHOULDER PAIN, UNSPECIFIED CHRONICITY: ICD-10-CM

## 2023-07-31 PROCEDURE — 96372 THER/PROPH/DIAG INJ SC/IM: CPT

## 2023-07-31 PROCEDURE — 97530 THERAPEUTIC ACTIVITIES: CPT

## 2023-07-31 PROCEDURE — 73200 CT UPPER EXTREMITY W/O DYE: CPT | Performed by: EMERGENCY MEDICINE

## 2023-07-31 PROCEDURE — 99284 EMERGENCY DEPT VISIT MOD MDM: CPT

## 2023-07-31 PROCEDURE — 99285 EMERGENCY DEPT VISIT HI MDM: CPT

## 2023-07-31 PROCEDURE — 97162 PT EVAL MOD COMPLEX 30 MIN: CPT

## 2023-07-31 PROCEDURE — 73030 X-RAY EXAM OF SHOULDER: CPT

## 2023-07-31 RX ORDER — HYDROCODONE BITARTRATE AND ACETAMINOPHEN 5; 325 MG/1; MG/1
2 TABLET ORAL ONCE
Status: DISCONTINUED | OUTPATIENT
Start: 2023-07-31 | End: 2023-07-31

## 2023-07-31 RX ORDER — HYDROCODONE BITARTRATE AND ACETAMINOPHEN 5; 325 MG/1; MG/1
2 TABLET ORAL ONCE
Status: COMPLETED | OUTPATIENT
Start: 2023-07-31 | End: 2023-07-31

## 2023-07-31 RX ORDER — HYDROCODONE BITARTRATE AND ACETAMINOPHEN 5; 325 MG/1; MG/1
1 TABLET ORAL ONCE
Status: COMPLETED | OUTPATIENT
Start: 2023-07-31 | End: 2023-07-31

## 2023-07-31 RX ORDER — HYDROMORPHONE HYDROCHLORIDE 1 MG/ML
0.5 INJECTION, SOLUTION INTRAMUSCULAR; INTRAVENOUS; SUBCUTANEOUS ONCE
Status: COMPLETED | OUTPATIENT
Start: 2023-07-31 | End: 2023-07-31

## 2023-07-31 NOTE — ED QUICK NOTES
Patient was in bathroom, went to reach for a rail to help pull herself up from toilet. Felt a pop/crack in right shoulder. Patient has + radial pulse. Has near full ROM when assisted, but has trouble lifting the arm herself without having pain. No obvious deformity. Patient has a history of arthritis.  Takes Norco. [employer] Safety Interventions

## 2023-07-31 NOTE — CM/SW NOTE
CM asked to see patient. Patient is wheelchair bound and lives alone. Due to her acute shoulder pain, patient states she will not be able to transfer herself in and out of wheelchair. Patient is requesting to be evaluated by PT to see if [facility] will approve [facility] stay. PT order entered. Patient and daughter, at bedside, aware that if [facility] does not approve [facility], her options would be hiring a caregiver or paying for respite care. Patient states she is unable to afford either. When asked if patient had family or friend that could stay with her until her shoulder pain subsides, patient and daughter indicate that may be a possibility. RN updated.

## 2023-07-31 NOTE — ED INITIAL ASSESSMENT (HPI)
Patient was in bathroom, went to use a railing to pull herself up and felt a pop in right shoulder/arm.

## 2023-07-31 NOTE — CM/SW NOTE
KIMBERLEY done  AIDIN referral initiated  Per daughter, patient has been to the [facility] and had a good experience. 11:30AM - The [facility] has a bed available. They will take patient under her Medicaid and initiate auth from [facility]. [facility] can take patient at [address] in [address]  RN updated. Patient and daughter at bedside made aware.

## 2023-07-31 NOTE — PHYSICAL THERAPY NOTE
PHYSICAL THERAPY EVALUATION - INPATIENT     Room Number: A3/A3  Evaluation Date: 7/31/2023  Type of Evaluation: Initial  Physician Order: PT Eval and Treat    Presenting Problem: R shoulder pain  Co-Morbidities : PVD, COPD, SLE, L AKA  Reason for Therapy: Mobility Dysfunction and Discharge Planning    History related to current admission: Patient is a 69year old female admitted on 7/31/2023 from home for complaints of R shoulder pain. X-ray shoulder 7/31/23:  . Degenerative change. Please refer to CT of the shoulder subsequently performed for further details. CT shoulder R 7/31/23:  CONCLUSION:  Marked degenerative and arthritic changes are seen in the glenohumeral joint. No acute fracture, subluxation or dislocation. No joint effusion. ASSESSMENT   PT orders received, chart reviewed, and RN approved PT session. In this PT evaluation, the patient presents with the following impairments: R should pain and immobility, dec strength/ROM R shoulder, dec sitting balance/tolerance, and dec overall functional mobility compared to baseline. These impairments and comorbidities manifest themselves as functional limitations in independent bed mobility, transfers, and gait. The patient is below baseline and would benefit from skilled inpatient PT to address the above deficits to assist patient in returning to prior to level of function. Functional outcome measures completed include [facility]. The AM-PAC '6-Clicks' Inpatient Basic Mobility Short Form was completed and this patient is demonstrating a Approx Degree of Impairment: 86.62%  degree of impairment in mobility. Research supports that patients with this level of impairment may benefit from DC to [facility]. DISCHARGE RECOMMENDATIONS  PT Discharge Recommendations: Sub-acute rehabilitation    PLAN  PT Treatment Plan: Bed mobility; Body mechanics; Endurance; Energy conservation;Patient education; Family education;Range of motion;Strengthening;Transfer training;Balance training  Rehab Potential : Fair  Frequency (Obs): 3x/week  Number of Visits to Meet Established Goals: 5      CURRENT GOALS    Goal #1 Patient is able to demonstrate supine - sit EOB @ level: supervision     Goal #2 Patient is able to demonstrate transfers EOB to/from Chair/Wheelchair at assistance level: supervision     Goal #3 Pt to sit EOB x 10 minutes with supervision     Goal #4    Goal #5    Goal #6    Goal Comments: Goals established on 7/31/2023    HOME SITUATION  Type of Home: [facility] [place] [address]: One level                Lives With: Alone  Drives: No  Patient Owned Equipment: Wheelchair       Prior Level of Stewart: PTA, pt wheelchair bound, able to perform squat pivot transfers from bed<>wheelchair, independent with ADL. Pt lives alone, and has a  once a month. Pt denies any falls. Pt's daughters live close by. SUBJECTIVE  Pt cooperative, inc R UE pain with movement      OBJECTIVE  Precautions: Bed/chair alarm  Fall Risk: High fall risk    WEIGHT BEARING RESTRICTION                   PAIN ASSESSMENT  Rating: Unable to rate  Location: R shoulder pain  Management Techniques:  Activity promotion;Relaxation;Repositioning    COGNITION  Overall Cognitive Status:  WFL - within functional limits    RANGE OF MOTION AND STRENGTH ASSESSMENT  Upper extremity ROM and strength are within functional limits  L UE WNL, Pt with limited R shoulder AROM to 65 deg, PROM to 95 deg, elbow/wrist/hand ROM WNL, strength deferred due to pain    Lower extremity ROM is within functional limits  R LE WNL, pt with L AKA    Lower extremity strength is within functional limits  R hip flexion 3/5, knee extension/flexion 3+/5      BALANCE  Static Sitting: Fair  Dynamic Sitting: Fair -  Static Standing: Not tested  Dynamic Standing: Not tested    ADDITIONAL TESTS                                    ACTIVITY TOLERANCE  Pulse: 98  Heart Rate Source: Monitor     BP: 156/89  BP Location: Left arm  BP Method: Automatic  Patient Position: Lying    O2 WALK  Oxygen Therapy  SPO2% on Room Air at Rest: 94    NEUROLOGICAL FINDINGS                        AM-PAC '6-Clicks' INPATIENT SHORT FORM - BASIC MOBILITY  How much difficulty does the patient currently have. .. Patient Difficulty: Turning over in bed (including adjusting bedclothes, sheets and blankets)?: A Lot   Patient Difficulty: Sitting down on and standing up from a chair with arms (e.g., wheelchair, bedside commode, etc.): Unable   Patient Difficulty: Moving from lying on back to sitting on the side of the bed?: A Lot   How much help from another person does the patient currently need. .. Help from Another: Moving to and from a bed to a chair (including a wheelchair)?: Total   Help from Another: Need to walk in hospital room?: Total   Help from Another: Climbing 3-5 steps with a railing?: Total       AM-PAC Score:  Raw Score: 8   Approx Degree of Impairment: 86.62%   Standardized Score (AM-PAC Scale): 28.58   CMS Modifier (G-Code): CM    FUNCTIONAL ABILITY STATUS  Gait Assessment   Functional Mobility/Gait Assessment  Gait Assistance: Not tested    Skilled Therapy Provided     Bed Mobility:  Rolling: Min A  Supine to sit: Min A   Sit to supine: Min A     Transfer Mobility:  Sit to stand: NT    Stand to sit: NT  Gait = NT    Therapist's Comments: pt lying in bed and agreed to PT. Pt's two daughters at bedside. Pt with reports of R shoulder pain with any movement. Instructed pt in proper body mechanics with supine<>sidelying, including log rolling technique. Pt with difficulty pushing up to sitting from sidelying, due to dec strength and R shoulder pain. Pt required R UE support when sitting EOB, pt required L UE to trunk support, and CGA for balance at times. Pt assisted to return into supine position, positioned on the left side with R UE supported.  Pt instructed in symptom management techniques such as use of ice throughout the day to the R shoulder, positioning, AAROM with assistance from BRIGETTE ZARATE (PT instructed and demonstrated this). Pt in understanding. Discussed DC recommendations to [facility], the importance of safety, activity level and pacing. RN notified and also discussed with Dale Loya assessed and WNL throughout session. Exercise/Education Provided:  Bed mobility  Body mechanics  Energy conservation  ROM    Patient End of Session: In bed;Needs met;Call light within reach;RN aware of session/findings; All patient questions and concerns addressed; Family present; Discussed recommendations with /      Patient Evaluation Complexity Level:  History High - 3 or more personal factors and/or co-morbidities   Examination of body systems Moderate - addressing a total of 3 or more elements   Clinical Presentation Moderate - Evolving   Clinical Decision Making Moderate - Evolving       PT Session Time: 35 minutes  Gait Trainin minutes  Therapeutic Activity: 15 minutes  Neuromuscular Re-education: 0 minutes  Therapeutic Exercise: 0 minutes

## 2023-07-31 NOTE — ED QUICK NOTES
Patient requesting pain medication prior to leaving with [facility].  Verbal order given from Dr Mar Vilchis for [address] [facility].

## 2023-07-31 NOTE — ED QUICK NOTES
Daughter reports other family member will be retrieving her Norco prescription bottle from home to bring to SNF.

## 2023-08-01 ENCOUNTER — EXTERNAL FACILITY (OUTPATIENT)
Dept: FAMILY MEDICINE CLINIC | Facility: CLINIC | Age: 78
End: 2023-08-01

## 2023-08-01 DIAGNOSIS — S78.112A ABOVE KNEE AMPUTATION OF LEFT LOWER EXTREMITY (HCC): ICD-10-CM

## 2023-08-01 DIAGNOSIS — D64.9 NORMOCYTIC ANEMIA: ICD-10-CM

## 2023-08-01 DIAGNOSIS — Z86.718 HISTORY OF BLOOD CLOTS: ICD-10-CM

## 2023-08-01 DIAGNOSIS — M75.21 BICIPITAL TENDINITIS OF RIGHT SHOULDER: Primary | ICD-10-CM

## 2023-08-01 DIAGNOSIS — M32.9 SYSTEMIC LUPUS ERYTHEMATOSUS, UNSPECIFIED SLE TYPE, UNSPECIFIED ORGAN INVOLVEMENT STATUS (HCC): ICD-10-CM

## 2023-08-01 DIAGNOSIS — G47.30 SLEEP APNEA, UNSPECIFIED TYPE: Chronic | ICD-10-CM

## 2023-08-01 DIAGNOSIS — Z89.512 STATUS POST BELOW-KNEE AMPUTATION OF LEFT LOWER EXTREMITY (HCC): ICD-10-CM

## 2023-08-01 DIAGNOSIS — I73.9 PERIPHERAL VASCULAR DISEASE OF EXTREMITY WITH CLAUDICATION (HCC): ICD-10-CM

## 2023-08-01 DIAGNOSIS — K21.9 GASTROESOPHAGEAL REFLUX DISEASE WITHOUT ESOPHAGITIS: ICD-10-CM

## 2023-08-01 DIAGNOSIS — S42.032G DISPLACED FRACTURE OF LATERAL END OF LEFT CLAVICLE, SUBSEQUENT ENCOUNTER FOR FRACTURE WITH DELAYED HEALING: ICD-10-CM

## 2023-08-01 DIAGNOSIS — J44.9 CHRONIC OBSTRUCTIVE PULMONARY DISEASE, UNSPECIFIED COPD TYPE (HCC): ICD-10-CM

## 2023-08-01 DIAGNOSIS — F32.A DEPRESSION, UNSPECIFIED DEPRESSION TYPE: ICD-10-CM

## 2023-08-01 DIAGNOSIS — J43.9 PULMONARY EMPHYSEMA, UNSPECIFIED EMPHYSEMA TYPE (HCC): ICD-10-CM

## 2023-08-01 DIAGNOSIS — I25.10 CORONARY ARTERY DISEASE INVOLVING NATIVE HEART WITHOUT ANGINA PECTORIS, UNSPECIFIED VESSEL OR LESION TYPE: ICD-10-CM

## 2023-08-01 DIAGNOSIS — M19.011 ARTHRITIS OF RIGHT SHOULDER REGION: ICD-10-CM

## 2023-08-01 DIAGNOSIS — F06.4 ANXIETY DISORDER DUE TO KNOWN PHYSIOLOGICAL CONDITION: ICD-10-CM

## 2023-08-02 ENCOUNTER — TELEPHONE (OUTPATIENT)
Dept: ORTHOPEDICS CLINIC | Facility: CLINIC | Age: 78
End: 2023-08-02

## 2023-08-02 ENCOUNTER — PATIENT OUTREACH (OUTPATIENT)
Dept: CASE MANAGEMENT | Age: 78
End: 2023-08-02

## 2023-08-02 ENCOUNTER — INITIAL APN SNF VISIT (OUTPATIENT)
Dept: INTERNAL MEDICINE CLINIC | Age: 78
End: 2023-08-02

## 2023-08-02 VITALS
DIASTOLIC BLOOD PRESSURE: 67 MMHG | RESPIRATION RATE: 18 BRPM | TEMPERATURE: 98 F | SYSTOLIC BLOOD PRESSURE: 131 MMHG | OXYGEN SATURATION: 97 % | HEART RATE: 67 BPM

## 2023-08-02 DIAGNOSIS — M19.019 ARTHRITIS OF SHOULDER: Primary | ICD-10-CM

## 2023-08-02 DIAGNOSIS — F06.4 ANXIETY DISORDER DUE TO KNOWN PHYSIOLOGICAL CONDITION: ICD-10-CM

## 2023-08-02 DIAGNOSIS — M54.9 CHRONIC BILATERAL BACK PAIN, UNSPECIFIED BACK LOCATION: ICD-10-CM

## 2023-08-02 DIAGNOSIS — K59.00 CONSTIPATION, UNSPECIFIED CONSTIPATION TYPE: ICD-10-CM

## 2023-08-02 DIAGNOSIS — R35.0 URINARY FREQUENCY: ICD-10-CM

## 2023-08-02 DIAGNOSIS — G47.00 INSOMNIA, UNSPECIFIED TYPE: ICD-10-CM

## 2023-08-02 DIAGNOSIS — M25.512 LEFT SHOULDER PAIN, UNSPECIFIED CHRONICITY: Primary | ICD-10-CM

## 2023-08-02 DIAGNOSIS — F32.A DEPRESSION, UNSPECIFIED DEPRESSION TYPE: ICD-10-CM

## 2023-08-02 DIAGNOSIS — I73.9 PERIPHERAL VASCULAR DISEASE OF EXTREMITY WITH CLAUDICATION (HCC): ICD-10-CM

## 2023-08-02 DIAGNOSIS — F17.219 CIGARETTE NICOTINE DEPENDENCE WITH NICOTINE-INDUCED DISORDER: ICD-10-CM

## 2023-08-02 DIAGNOSIS — E55.9 VITAMIN D INSUFFICIENCY: ICD-10-CM

## 2023-08-02 DIAGNOSIS — G89.29 CHRONIC BILATERAL BACK PAIN, UNSPECIFIED BACK LOCATION: ICD-10-CM

## 2023-08-02 DIAGNOSIS — R53.1 WEAKNESS: ICD-10-CM

## 2023-08-02 DIAGNOSIS — I25.10 CORONARY ARTERY DISEASE INVOLVING NATIVE HEART WITHOUT ANGINA PECTORIS, UNSPECIFIED VESSEL OR LESION TYPE: ICD-10-CM

## 2023-08-02 DIAGNOSIS — J44.9 CHRONIC OBSTRUCTIVE PULMONARY DISEASE, UNSPECIFIED COPD TYPE (HCC): ICD-10-CM

## 2023-08-02 PROCEDURE — 1123F ACP DISCUSS/DSCN MKR DOCD: CPT | Performed by: NURSE PRACTITIONER

## 2023-08-02 PROCEDURE — 99310 SBSQ NF CARE HIGH MDM 45: CPT | Performed by: NURSE PRACTITIONER

## 2023-08-02 RX ORDER — GUAIFENESIN 600 MG/1
600 TABLET, EXTENDED RELEASE ORAL 2 TIMES DAILY PRN
COMMUNITY

## 2023-08-02 RX ORDER — LIDOCAINE 4 G/G
1 PATCH TOPICAL EVERY 24 HOURS
COMMUNITY

## 2023-08-02 RX ORDER — SENNOSIDES 8.6 MG
8.6 TABLET ORAL 2 TIMES DAILY
COMMUNITY

## 2023-08-02 RX ORDER — NAPROXEN 250 MG/1
250 TABLET ORAL 2 TIMES DAILY WITH MEALS
COMMUNITY
End: 2023-08-07

## 2023-08-02 RX ORDER — MELATONIN
3 NIGHTLY PRN
COMMUNITY

## 2023-08-02 RX ORDER — FAMOTIDINE 20 MG/1
20 TABLET, FILM COATED ORAL 2 TIMES DAILY
COMMUNITY
End: 2023-08-07

## 2023-08-02 RX ORDER — MELATONIN
1000 DAILY
COMMUNITY

## 2023-08-02 RX ORDER — NICOTINE 21 MG/24HR
1 PATCH, TRANSDERMAL 24 HOURS TRANSDERMAL EVERY 24 HOURS
COMMUNITY

## 2023-08-02 RX ORDER — POLYETHYLENE GLYCOL 3350 17 G/17G
17 POWDER, FOR SOLUTION ORAL DAILY PRN
COMMUNITY

## 2023-08-02 RX ORDER — NYSTATIN 100000 [USP'U]/G
1 POWDER TOPICAL 2 TIMES DAILY
COMMUNITY

## 2023-08-02 NOTE — PROGRESS NOTES
received; [facility] [place] requesting assistance w/scheduling apt (dc 07/31)    [facility] is looking for an Ortho Dr, but no one is on pt AVS and pt did not see any Ortho Dr in the ER. Per [facility] she is going to talk w/the staff there and see what they want to do.   Closing encounter

## 2023-08-04 ENCOUNTER — SNF VISIT (OUTPATIENT)
Dept: INTERNAL MEDICINE CLINIC | Age: 78
End: 2023-08-04

## 2023-08-04 DIAGNOSIS — K59.01 CONSTIPATION, SLOW TRANSIT: Primary | ICD-10-CM

## 2023-08-04 DIAGNOSIS — M54.50 CHRONIC BILATERAL LOW BACK PAIN WITHOUT SCIATICA: ICD-10-CM

## 2023-08-04 DIAGNOSIS — R35.0 URINE FREQUENCY: ICD-10-CM

## 2023-08-04 DIAGNOSIS — G47.00 INSOMNIA, UNSPECIFIED TYPE: ICD-10-CM

## 2023-08-04 DIAGNOSIS — I73.9 PERIPHERAL VASCULAR DISEASE OF EXTREMITY WITH CLAUDICATION (HCC): ICD-10-CM

## 2023-08-04 DIAGNOSIS — Z51.89 ENCOUNTER FOR MEDICATION ADJUSTMENT: ICD-10-CM

## 2023-08-04 DIAGNOSIS — G89.29 CHRONIC BILATERAL LOW BACK PAIN WITHOUT SCIATICA: ICD-10-CM

## 2023-08-04 DIAGNOSIS — J44.9 CHRONIC OBSTRUCTIVE PULMONARY DISEASE, UNSPECIFIED COPD TYPE (HCC): ICD-10-CM

## 2023-08-04 DIAGNOSIS — F17.219 CIGARETTE NICOTINE DEPENDENCE WITH NICOTINE-INDUCED DISORDER: ICD-10-CM

## 2023-08-04 DIAGNOSIS — M25.511 ACUTE PAIN OF RIGHT SHOULDER: ICD-10-CM

## 2023-08-04 PROCEDURE — 99310 SBSQ NF CARE HIGH MDM 45: CPT | Performed by: NURSE PRACTITIONER

## 2023-08-04 NOTE — PROGRESS NOTES
Mert Bustamante  : 3/1/1945  Age 69year old  female patient is admitted to The [facility] for [address]. PCP: Dr. Monique Santiago. [facility] ER Visit: 2023. Hospital Discharge Diagnosis:  Arthritis Shoulder    HPI  Patient  is a 68 y/o  female  lives a lone and WC bound with PMH significant for L. AKA, PVD,COPD,DVT, OA, low back pain, Anxiety/Depression, and Breast CA who visited [facility] ER with right shoulder pain. According to patient she felt a pop when she was transferring with her arm onto the toilet at home. XR right shoulder was done showed degenerative change. CT R. Shoulder showed marked degenerative and arthritic changes are seen in the glenohumeral joint, No fracture,subluxation or dislocation and no joint effusion. Patient's pain was managed in the ER, however, patient needed more help. Therefore, when she was medically stabilized it was recommended to discharge her to the [facility] for [facility],nursing care,medical management, PT/OT/ST evaluation and treatment. Reason For Visit: Follow up Insomnia, Low back pain, Right shoulder pain,Constipation, and Urine Frequency. Patient seen today in bed with NAD. Denies any CP or dizziness. Reports Insomnia got better the first she took Melatonin however, states she couldn't sleep last night, NP educated patient on sleep hygiene, verbalized understanding,CPM. States low back pain is improving with Lidocaine Patch and Norco and also reports right shoulder pain had significantly improved with therapy and pain regimen. Still reports urine frequency,UA/C&S still not done, discussed with staff to straight cath as patient did inform NP she is incontinent. Patient still reports constipation for several days,bowel regimen was started on Wednesday, will adjust as appropriate.     Past Medical History:   Diagnosis Date    Breast CA ([facility] [address] 10/2018    invasive ductal carcinoma insitu    CAD (coronary artery disease)     Cataract     bilateral    COPD (chronic obstructive pulmonary disease) (HCC)     Deep vein thrombosis (HCC)     Left leg    Depression     Disorder of thyroid     nodules    History of blood clots     Lupus (HCC)     Muscle weakness     Neuropathy     Osteoarthritis     Peripheral vascular disease (HCC)     Sleep apnea     unable to tolerate machine     Past Surgical History:   Procedure Laterality Date    CATH DRUG ELUTING STENT      FOOT SURGERY      HIP REPLACEMENT SURGERY      LUMPECTOMY LEFT  10/2018    OTHER  RT HIP    OTHER  04/09/2018    Left BKA    OTHER Left 05/04/2018    left stump debridement    OTHER Left 05/17/2018    Left stump debridement per Dr. Jeremi Garzon      stent 2008     Family History   Problem Relation Age of Onset    Diabetes Mother     Cancer Father 79        prostate    Cancer Sister 61        cervical     Breast Cancer Daughter      Social History     Socioeconomic History    Marital status:    Tobacco Use    Smoking status: Every Day     Packs/day: 0.25     Years: 56.00     Pack years: 14.00     Types: Cigarettes    Smokeless tobacco: Never   Vaping Use    Vaping Use: Never used   Substance and Sexual Activity    Alcohol use: Yes     Comment: rare    Drug use: No       ALLERGIES:    Pcns [Penicillins]      RASH, SWELLING    CODE STATUS: FULL CODE      CURRENT MEDICATIONS:Reviewed and updated in EMR. VITALS:T-97.6 BP-140/87 R-18 HR-93 O2-98% on RA       REVIEW OF SYSTEMS:   Constitutional:Denies fever or chills. Denies poor appetite and fatigue. CV: Denies dizziness,palpitations or CP. Respiratory: Hx COPD. Denies SOB, cough or wheezing   GI: + Constipation for several days. Denies black or bloody stools. Denies Abd tenderness, diarrhea or constipation. Denies N or V.  :+ Urine frequency. Denies hematuria/dysuria. MUSC: +Low back pain. R. Arm/shoulder pain. Neuro: Denies seizures, tremors,syncope or headache. Psyche: +Anxiety/Depression   Hematologic:+ Anemia.  Denies excessive bruising,hemoptysis, or any bleeding. PHYSICAL EXAM:  Gen: NAD. A+O x3 and interactive. L. AKA and WC bound. Appears well developed and well-nourished. HEENT: NCAT;No jaundice or discharge;no visible cerumen or drainage; no nasal drainage and mucosa moist and pink. Neck: Neck supple,No JVD and FROM. CV: RRR , no murmur. Pulm: Non-labored and CTA B/L. No wheezes or crackles. Abd: Soft, NTND, BS normal.  EXTREMITIES/VASCULAR/MUSCULOSKELETAL: No peripheral edema and no deformities. No cyanosis, 2+ pedal/radial pulses. Weakness 2/2 hospitalization/diagnoses/sequelae. R. Shoulder pain able to perform ROM. L. AKA   Back:+ low back pain. Skin/Wound: Warm and moist. No lesions or open wounds/ulcers. Refer SNF Wound Care assessment notes for updates. CATHETER/DRAINS/TUBES/LINES: none  Neuro: AOx3,face symmetric,follows commands, CN II-XII intact, sensation to touch intact,and didn't observe gait. Uses walker/bed bound/wheel chair. Psych: Normal mood and affect. Calm,cooperative, and appropriate. ASSESSMENT/PLAN:  Plan Of Care Updates  -Reviewed labs,medications, and VS.  -Discussed and explained plan of care. -Adjusted [place] 2 tabs BID.  -CBC/BMP 8/7.  - UA C & S not collected, reminded staff to collect and may straight cath as patient is incontinent. DIAGNOSTICS/LABS:  8/1-wbc 8.69, H&H 13.8, 42.9, , BUN 13, Crt 0.63, Na 142, K 4.7, mg 2.1, TSH 0.221    Therapy update 8/2/23  Wheelchair dependent since 2018   Bed Mobility Mod A   Slide board transfer   DC : Lives alone at home, but a careAide to help her w/ showers, errands, groceries ect, has Meals on wheels 5x per week. Physical Deconditioning/Impaired Mobility/Fall Risk/ADL Dysfunction  -Fall precaution  -PT/OT/ST eval and tx  -Encouraged to call for assistance when needed. Discharge Planning  -SW to assist with discharge planning,assistive devices,HH services and access to community resources as needed.   -SW/Rehab team to organize a care plan meeting with patient and family to discuss safe discharge plan. -ELOS 14 -21days  -Tentative discharge date and plan TBD; plans to discharge home lives independently with home services    Right shoulder arthritis/Back pain   -Hx Osteoarthritis  -Acetaminophen 500 mg every 6 hrs prn   -Hydrocodone-acetaminophen  mg 1 tablet every 6 hrs prn  -Schedule Orthopedic follow up   -Physiatry following reccs appreciated:  -Lidocaine 4% to low back   -Diclofenac Gel to right shoulder daily x 5 days   -Naproxen 250 mg every 12 hrs x 5 days, add Famotidine 20 mg bid x 5 days for GI protection (Till 8/7). COPD/Nicotine dependence   - 1 Flovent puff bid   -Mucinex 600 mg every 12 hrs prn   -Nicotine patch 21 mg every 24 hrs     Insomnia   -Discussed Good sleep Hygiene  -Melatonin 3 mg nightly prn     Urinary frequency   -No other symptoms   - UA C & S not collected, reminded staff to collect and may straight cath as patient is incontinent. Constipation/Bowel management  -Encouraged to increase activity and oral fluid intake up to 2L per day. -Adjusted to Senna S 2 tabs bid   -Miralax 17 gm daily prn   -Monitor for BM daily. Vit D insufficiency   -8/1 Vit D level 25.7   -Vit D 5000 units daily  -Will recommend to repeat Vitamin D level in 6-8 weeks. Anxiety/depression   -Monitor symptoms   -Psych to follow in Rehab    PVD/CAD  -Aspirin 81 mg daily     Low TSH  -8/1 TSH 0.221  -Repeat TSH on 8/7. Pain Management  -Physiatry eval and management  -May pre-medication 30-60 mins before therapy  -Acetaminophen 500 mg every 6 hrs prn   -Hydrocodone-acetaminophen  mg 1 tablet every 6 hrs prn  -Physiatry following reccs appreciated:  -Lidocaine 4% to low back   -Diclofenac Gel to right shoulder daily x 5 days till 8/7.  -Naproxen 250 mg every 12 hrs x 5 days, add Famotidine 20 mg bid x 5 days for GI protection (Till 8/7).     DVT Prophylaxis  -Encouraged early mobilization and participation  -Participate in PT/OT    Supplements 2/2 deficiencies  -DANICA RD to follow;supplementation/diet reccs as needed  -Continue home supplements:  MVI daily   Vit B12 daily     Follow up Appointments  -PCP, 1-2 weeks after discharge from Rehab.  -All specialities as recommended/instructed. -Follow up with Orthopedic         Aylin Sweeney, APRN  8/4/2023  1:20 PM    > 45 total minutes spent with patient/family >50% of visit time was spent in counseling, direct patient care, reviewing medical records,education and coordination of care with staff and interdisciplinary team.          Note to patient: The [id number] makes medical notes like these available to patients in the interest of transparency. However, this is a medical document intended as peer to peer communication. It is written in medical language and may contain abbreviations or verbiage that are unfamiliar. It may appear blunt or direct. Medical documents are intended to carry relevant information, facts as evident, and the clinical opinion of the practitioner who signs the document.

## 2023-08-07 ENCOUNTER — SNF VISIT (OUTPATIENT)
Dept: INTERNAL MEDICINE CLINIC | Age: 78
End: 2023-08-07

## 2023-08-07 VITALS
HEART RATE: 65 BPM | RESPIRATION RATE: 18 BRPM | TEMPERATURE: 98 F | SYSTOLIC BLOOD PRESSURE: 120 MMHG | OXYGEN SATURATION: 98 % | DIASTOLIC BLOOD PRESSURE: 67 MMHG

## 2023-08-07 DIAGNOSIS — G89.29 CHRONIC BILATERAL LOW BACK PAIN WITHOUT SCIATICA: ICD-10-CM

## 2023-08-07 DIAGNOSIS — K59.01 CONSTIPATION, SLOW TRANSIT: Primary | ICD-10-CM

## 2023-08-07 DIAGNOSIS — R35.0 URINE FREQUENCY: ICD-10-CM

## 2023-08-07 DIAGNOSIS — M25.511 ACUTE PAIN OF RIGHT SHOULDER: ICD-10-CM

## 2023-08-07 DIAGNOSIS — R53.1 WEAKNESS: ICD-10-CM

## 2023-08-07 DIAGNOSIS — M19.019 ARTHRITIS OF SHOULDER: ICD-10-CM

## 2023-08-07 DIAGNOSIS — M54.50 CHRONIC BILATERAL LOW BACK PAIN WITHOUT SCIATICA: ICD-10-CM

## 2023-08-07 DIAGNOSIS — I73.9 PERIPHERAL VASCULAR DISEASE OF EXTREMITY WITH CLAUDICATION (HCC): ICD-10-CM

## 2023-08-07 DIAGNOSIS — J44.9 CHRONIC OBSTRUCTIVE PULMONARY DISEASE, UNSPECIFIED COPD TYPE (HCC): ICD-10-CM

## 2023-08-07 DIAGNOSIS — G47.00 INSOMNIA, UNSPECIFIED TYPE: ICD-10-CM

## 2023-08-07 DIAGNOSIS — F17.219 CIGARETTE NICOTINE DEPENDENCE WITH NICOTINE-INDUCED DISORDER: ICD-10-CM

## 2023-08-08 ENCOUNTER — SNF VISIT (OUTPATIENT)
Dept: INTERNAL MEDICINE CLINIC | Age: 78
End: 2023-08-08

## 2023-08-08 DIAGNOSIS — M54.50 CHRONIC BILATERAL LOW BACK PAIN WITHOUT SCIATICA: ICD-10-CM

## 2023-08-08 DIAGNOSIS — Z71.89 ENCOUNTER FOR MEDICATION REVIEW AND COUNSELING: ICD-10-CM

## 2023-08-08 DIAGNOSIS — Z09 FOLLOW-UP EXAM: ICD-10-CM

## 2023-08-08 DIAGNOSIS — M79.601 RIGHT ARM PAIN: ICD-10-CM

## 2023-08-08 DIAGNOSIS — Z71.2 ENCOUNTER TO DISCUSS TEST RESULTS: ICD-10-CM

## 2023-08-08 DIAGNOSIS — G89.29 CHRONIC BILATERAL LOW BACK PAIN WITHOUT SCIATICA: ICD-10-CM

## 2023-08-08 DIAGNOSIS — N39.0 URINARY TRACT INFECTION WITHOUT HEMATURIA, SITE UNSPECIFIED: Primary | ICD-10-CM

## 2023-08-08 PROCEDURE — 99310 SBSQ NF CARE HIGH MDM 45: CPT | Performed by: NURSE PRACTITIONER

## 2023-08-08 NOTE — PROGRESS NOTES
Graciela Wellington  : 3/1/1945  Age 69year old  female patient is admitted to The [facility] for M[address] 12. PCP: Dr. Gaurav Carvalho. [facility] ER Visit: 2023. Hospital Discharge Diagnosis:  Arthritis Shoulder    HPI  Patient  is a 70 y/o  female  lives a lone and WC bound with PMH significant for L. AKA, PVD,COPD,DVT, OA, low back pain, Anxiety/Depression, and Breast CA who visited [facility] ER with right shoulder pain. According to patient she felt a pop when she was transferring with her arm onto the toilet at home. XR right shoulder was done showed degenerative change. CT R. Shoulder showed marked degenerative and arthritic changes are seen in the glenohumeral joint, No fracture,subluxation or dislocation and no joint effusion. Patient's pain was managed in the ER, however, patient needed more help. Therefore, when she was medically stabilized it was recommended to discharge her to the [facility] for DANICA,nursing care,medical management, PT/OT/ST evaluation and treatment. Reason For Visit: Follow up  Low back pain, Right shoulder pain,Constipation, and Urine Frequency. Patient seen today in bed with NAD. Denies any CP or dizziness. Reports low back pain is controlled with current pain regimen, rates pain at 4 and rates R. Shoulder pain at 3 and states pain is controlled with current pain regimen. Also reports she is able put on clothes herself and still working on transferring with the right hand. Patient refuses to go for her ortho appt on  with Dr. Bianka Olivas and states the office is too far from the [address] [place] [address] and she will rather waits till she gets better to follow up. UA/C&S collected and sent out , UA resulted today,  ans showed +UTI, will await final culture result for the appropriate treatment. Constipation has resolved and now on Senna S. 1 tab BID, denies any C/D/D/V.     Past Medical History:   Diagnosis Date    Breast CA ([facility] [address] 10/2018    invasive ductal carcinoma insitu    CAD (coronary artery disease)     Cataract     bilateral    COPD (chronic obstructive pulmonary disease) (HCC)     Deep vein thrombosis (HCC)     Left leg    Depression     Disorder of thyroid     nodules    History of blood clots     Lupus (HCC)     Muscle weakness     Neuropathy     Osteoarthritis     Peripheral vascular disease (HCC)     Sleep apnea     unable to tolerate machine     Past Surgical History:   Procedure Laterality Date    CATH DRUG ELUTING STENT      FOOT SURGERY      HIP REPLACEMENT SURGERY      LUMPECTOMY LEFT  10/2018    OTHER  RT HIP    OTHER  04/09/2018    Left BKA    OTHER Left 05/04/2018    left stump debridement    OTHER Left 05/17/2018    Left stump debridement per Dr. Kiara Agudelo      stent 2008     Family History   Problem Relation Age of Onset    Diabetes Mother     Cancer Father 79        prostate    Cancer Sister 61        cervical     Breast Cancer Daughter      Social History     Socioeconomic History    Marital status:    Tobacco Use    Smoking status: Every Day     Packs/day: 0.25     Years: 56.00     Pack years: 14.00     Types: Cigarettes    Smokeless tobacco: Never   Vaping Use    Vaping Use: Never used   Substance and Sexual Activity    Alcohol use: Yes     Comment: rare    Drug use: No       ALLERGIES:    Pcns [Penicillins]      RASH, SWELLING    CODE STATUS: FULL CODE      CURRENT MEDICATIONS:Reviewed and updated in EMR. VITALS:T-97.7 BP-121/71 R-18 HR-72 O2-98% on RA       REVIEW OF SYSTEMS:   Constitutional:Denies fever or chills. Denies poor appetite and fatigue. CV: Denies dizziness,palpitations or CP. Respiratory: Hx COPD. Denies SOB, cough or wheezing   GI:Constipation resolved. Denies black or bloody stools. Denies Abd tenderness, diarrhea or constipation. Denies N or V.  :+ Urine frequency. Denies hematuria/dysuria. MUSC: +Low back pain. R. Arm/shoulder pain. Neuro: Denies seizures, tremors,syncope or headache.   Psyche: +Anxiety/Depression Hematologic:+ Anemia. Denies excessive bruising,hemoptysis, or any bleeding. PHYSICAL EXAM:  Gen: NAD. A+O x3 and interactive. L. AKA and WC bound. Appears well developed and well-nourished. HEENT: NCAT;No jaundice or discharge;no visible cerumen or drainage; no nasal drainage and mucosa moist and pink. Neck: Neck supple,No JVD and FROM. CV: RRR , no murmur. Pulm: Non-labored and CTA B/L. No wheezes or crackles. Abd: Soft, NTND, BS normal.  EXTREMITIES/VASCULAR/MUSCULOSKELETAL: No peripheral edema and no deformities. No cyanosis, 2+ pedal/radial pulses. Weakness 2/2 hospitalization/diagnoses/sequelae. R. Shoulder pain able to perform ROM. L. AKA   Back:+ low back pain. Skin/Wound: Warm and moist. No lesions or open wounds/ulcers. Refer SNF Wound Care assessment notes for updates. CATHETER/DRAINS/TUBES/LINES: none  Neuro: AOx3,face symmetric,follows commands, CN II-XII intact, sensation to touch intact,and didn't observe gait. Uses walker/bed bound/wheel chair. Psych: Normal mood and affect. Calm,cooperative, and appropriate. ASSESSMENT/PLAN:  Plan Of Care Updates  -Reviewed labs and VS.  -Discussed and explained plan of care. -Continue Senakot 1 tabs BID.  -CBC/BMP 8/14. -UA/C&S collected and sent out 8/7, UA resulted today, 8/8 ans showed +UTI, will await final culture result for the appropriate treatment. UA C & S collected and sent out 8/7.  -Patient refuses to go for her ortho appt on 8/9 with Dr. Genoveva Resendez and states the office is too far from the [address] [place] [address] and she will rather waits till she gets better to follow up.     DIAGNOSTICS/LABS:  8/7-wbc 7.24, H&H 13.4, 42, , Crt 0.79, Bun 19  8/1-wbc 8.69, H&H 13.8, 42.9, , BUN 13, Crt 0.63, Na 142, K 4.7, mg 2.1, TSH 0.221    Therapy update 8/2/23  Wheelchair dependent since 2018   Bed Mobility Mod A   Slide board transfer   DC : Lives alone at home, but a careAide to help her w/ showers, errands, groceries ect, has Meals on wheels 5x per week. Physical Deconditioning/Impaired Mobility/Fall Risk/ADL Dysfunction  -Fall precaution  -PT/OT/ST eval and tx  -Encouraged to call for assistance when needed. Discharge Planning  -SW to assist with discharge planning,assistive devices,HH services and access to community resources as needed. -SW/Rehab team to organize a care plan meeting with patient and family to discuss safe discharge plan. -ELOS 14 -21days  -Tentative discharge date and plan TBD; plans to discharge home lives independently with home services    Right shoulder arthritis/Back pain   -Hx Osteoarthritis  -Acetaminophen 500 mg every 6 hrs prn   -Hydrocodone-acetaminophen  mg 1 tablet every 6 hrs prn  -Patient refuses to go for her ortho appt on 8/9 with Dr. Gavin Cox and states the office is too far from the [address] [place] [address] and she will rather waits till she gets better to follow up.  -Physiatry following reccs appreciated:  -Lidocaine 4% to low back   -Completed Diclofenac Gel to right shoulder daily x 5 days  on 8/7.  -Completed Naproxen 250 mg every 12 hrs x 5 days, Famotidine 20 mg bid x 5 days for GI protection (on 8/7). COPD/Nicotine dependence   - 1 Flovent puff bid   -Mucinex 600 mg every 12 hrs prn   -Nicotine patch 21 mg every 24 hrs     Insomnia   -Discussed Good sleep Hygiene  -Melatonin 3 mg nightly prn     Urinary frequency   -No other symptoms   -UA/C&S collected and sent out 8/7, UA resulted today, 8/8 ans showed +UTI, will await final culture result for the appropriate treatment. Constipation/Bowel management  -Encouraged to increase activity and oral fluid intake up to 2L per day. -Senna S 1 tab bid   -Miralax 17 gm daily prn   -Monitor for BM daily. Vit D insufficiency   -8/1 Vit D level 25.7   -Vit D 5000 units daily  -Will recommend to repeat Vitamin D level in 6-8 weeks.      Anxiety/depression   -Monitor symptoms   -Psych to follow in Rehab    PVD/CAD  -Aspirin 81 mg daily     Low TSH  -8/1 TSH 0.221  -Monitor. Pain Management  -Physiatry eval and management  -May pre-medication 30-60 mins before therapy  -Acetaminophen 500 mg every 6 hrs prn   -Hydrocodone-acetaminophen  mg 1 tablet every 6 hrs prn  -Physiatry following reccs appreciated:  -Lidocaine 4% to low back   -Diclofenac Gel to right shoulder daily x 5 days till 8/7.  -Completed Naproxen 250 mg every 12 hrs x 5 days, add Famotidine 20 mg bid x 5 days for GI protection (on 8/7). DVT Prophylaxis  -Encouraged early mobilization and participation  -Participate in PT/OT    Supplements 2/2 deficiencies  -DANICA RD to follow;supplementation/diet reccs as needed  -Continue home supplements:  MVI daily   Vit B12 daily     Follow up Appointments  -PCP, 1-2 weeks after discharge from Rehab.  -All specialities as recommended/instructed. -Follow up with Orthopedic         Aylin Brush, APRN  8/8/2023  2:48 PM    > 45 total minutes spent with patient/family >50% of visit time was spent in counseling, direct patient care, reviewing medical records,education and coordination of care with staff and interdisciplinary team.          Note to patient: The [facility] makes medical notes like these available to patients in the interest of transparency. However, this is a medical document intended as peer to peer communication. It is written in medical language and may contain abbreviations or verbiage that are unfamiliar. It may appear blunt or direct. Medical documents are intended to carry relevant information, facts as evident, and the clinical opinion of the practitioner who signs the document.

## 2023-08-09 ENCOUNTER — SNF VISIT (OUTPATIENT)
Dept: INTERNAL MEDICINE CLINIC | Age: 78
End: 2023-08-09

## 2023-08-09 VITALS
SYSTOLIC BLOOD PRESSURE: 159 MMHG | OXYGEN SATURATION: 95 % | HEART RATE: 68 BPM | TEMPERATURE: 98 F | DIASTOLIC BLOOD PRESSURE: 69 MMHG | RESPIRATION RATE: 18 BRPM

## 2023-08-09 DIAGNOSIS — N39.0 URINARY TRACT INFECTION WITHOUT HEMATURIA, SITE UNSPECIFIED: Primary | ICD-10-CM

## 2023-08-09 DIAGNOSIS — J44.9 CHRONIC OBSTRUCTIVE PULMONARY DISEASE, UNSPECIFIED COPD TYPE (HCC): ICD-10-CM

## 2023-08-09 DIAGNOSIS — M79.601 RIGHT ARM PAIN: ICD-10-CM

## 2023-08-09 DIAGNOSIS — G89.29 CHRONIC BILATERAL LOW BACK PAIN WITHOUT SCIATICA: ICD-10-CM

## 2023-08-09 DIAGNOSIS — M54.50 CHRONIC BILATERAL LOW BACK PAIN WITHOUT SCIATICA: ICD-10-CM

## 2023-08-09 DIAGNOSIS — I73.9 PERIPHERAL VASCULAR DISEASE OF EXTREMITY WITH CLAUDICATION (HCC): ICD-10-CM

## 2023-08-09 DIAGNOSIS — F17.219 CIGARETTE NICOTINE DEPENDENCE WITH NICOTINE-INDUCED DISORDER: ICD-10-CM

## 2023-08-09 DIAGNOSIS — R53.1 WEAKNESS: ICD-10-CM

## 2023-08-09 PROCEDURE — 99309 SBSQ NF CARE MODERATE MDM 30: CPT | Performed by: NURSE PRACTITIONER

## 2023-08-11 ENCOUNTER — SNF VISIT (OUTPATIENT)
Dept: INTERNAL MEDICINE CLINIC | Age: 78
End: 2023-08-11

## 2023-08-11 DIAGNOSIS — R53.81 PHYSICAL DECONDITIONING: ICD-10-CM

## 2023-08-11 DIAGNOSIS — Z89.512 STATUS POST BELOW-KNEE AMPUTATION OF LEFT LOWER EXTREMITY (HCC): ICD-10-CM

## 2023-08-11 DIAGNOSIS — R19.5 LOOSE STOOLS: ICD-10-CM

## 2023-08-11 DIAGNOSIS — F06.4 ANXIETY DISORDER DUE TO KNOWN PHYSIOLOGICAL CONDITION: ICD-10-CM

## 2023-08-11 DIAGNOSIS — N39.0 URINARY TRACT INFECTION WITHOUT HEMATURIA, SITE UNSPECIFIED: Primary | ICD-10-CM

## 2023-08-11 DIAGNOSIS — Z71.89 ENCOUNTER FOR MEDICATION REVIEW AND COUNSELING: ICD-10-CM

## 2023-08-11 DIAGNOSIS — M54.50 CHRONIC BILATERAL LOW BACK PAIN WITHOUT SCIATICA: ICD-10-CM

## 2023-08-11 DIAGNOSIS — G89.29 CHRONIC BILATERAL LOW BACK PAIN WITHOUT SCIATICA: ICD-10-CM

## 2023-08-11 DIAGNOSIS — Z78.9 DECREASED ACTIVITIES OF DAILY LIVING (ADL): ICD-10-CM

## 2023-08-11 PROCEDURE — 99310 SBSQ NF CARE HIGH MDM 45: CPT | Performed by: NURSE PRACTITIONER

## 2023-08-14 ENCOUNTER — SNF VISIT (OUTPATIENT)
Dept: INTERNAL MEDICINE CLINIC | Age: 78
End: 2023-08-14

## 2023-08-14 VITALS
DIASTOLIC BLOOD PRESSURE: 80 MMHG | SYSTOLIC BLOOD PRESSURE: 129 MMHG | TEMPERATURE: 98 F | HEART RATE: 77 BPM | OXYGEN SATURATION: 98 % | RESPIRATION RATE: 18 BRPM

## 2023-08-14 DIAGNOSIS — M54.50 CHRONIC BILATERAL LOW BACK PAIN WITHOUT SCIATICA: ICD-10-CM

## 2023-08-14 DIAGNOSIS — N39.0 URINARY TRACT INFECTION WITHOUT HEMATURIA, SITE UNSPECIFIED: ICD-10-CM

## 2023-08-14 DIAGNOSIS — I73.9 PERIPHERAL VASCULAR DISEASE OF EXTREMITY WITH CLAUDICATION (HCC): ICD-10-CM

## 2023-08-14 DIAGNOSIS — R53.1 WEAKNESS: ICD-10-CM

## 2023-08-14 DIAGNOSIS — R19.5 LOOSE STOOLS: ICD-10-CM

## 2023-08-14 DIAGNOSIS — G47.00 INSOMNIA, UNSPECIFIED TYPE: ICD-10-CM

## 2023-08-14 DIAGNOSIS — M25.511 ACUTE PAIN OF RIGHT SHOULDER: Primary | ICD-10-CM

## 2023-08-14 DIAGNOSIS — G89.29 CHRONIC BILATERAL LOW BACK PAIN WITHOUT SCIATICA: ICD-10-CM

## 2023-08-14 DIAGNOSIS — G54.6 PHANTOM LIMB PAIN (HCC): ICD-10-CM

## 2023-08-14 DIAGNOSIS — J44.9 CHRONIC OBSTRUCTIVE PULMONARY DISEASE, UNSPECIFIED COPD TYPE (HCC): ICD-10-CM

## 2023-08-14 DIAGNOSIS — R35.0 URINE FREQUENCY: ICD-10-CM

## 2023-08-14 DIAGNOSIS — K30 INDIGESTION: ICD-10-CM

## 2023-08-14 RX ORDER — NITROFURANTOIN 25; 75 MG/1; MG/1
100 CAPSULE ORAL 2 TIMES DAILY
COMMUNITY
Start: 2023-08-11 | End: 2023-08-15

## 2023-08-14 RX ORDER — GARLIC EXTRACT 500 MG
1 CAPSULE ORAL DAILY
COMMUNITY
End: 2023-08-29

## 2023-08-14 RX ORDER — FAMOTIDINE 20 MG/1
20 TABLET, FILM COATED ORAL DAILY
COMMUNITY

## 2023-08-14 RX ORDER — HYDROCODONE BITARTRATE AND ACETAMINOPHEN 10; 325 MG/1; MG/1
1 TABLET ORAL EVERY 6 HOURS PRN
Qty: 30 TABLET | Refills: 0 | Status: SHIPPED | OUTPATIENT
Start: 2023-08-14

## 2023-08-14 RX ORDER — CALCIUM CARBONATE 500 MG/1
1 TABLET, CHEWABLE ORAL 2 TIMES DAILY
COMMUNITY
End: 2023-08-14

## 2023-08-16 ENCOUNTER — SNF VISIT (OUTPATIENT)
Dept: INTERNAL MEDICINE CLINIC | Age: 78
End: 2023-08-16

## 2023-08-16 VITALS
DIASTOLIC BLOOD PRESSURE: 77 MMHG | TEMPERATURE: 98 F | HEART RATE: 83 BPM | OXYGEN SATURATION: 98 % | SYSTOLIC BLOOD PRESSURE: 115 MMHG | RESPIRATION RATE: 18 BRPM

## 2023-08-16 DIAGNOSIS — M25.511 ACUTE PAIN OF RIGHT SHOULDER: Primary | ICD-10-CM

## 2023-08-16 DIAGNOSIS — N39.0 URINARY TRACT INFECTION WITHOUT HEMATURIA, SITE UNSPECIFIED: ICD-10-CM

## 2023-08-16 DIAGNOSIS — R53.1 WEAKNESS: ICD-10-CM

## 2023-08-16 DIAGNOSIS — K30 INDIGESTION: ICD-10-CM

## 2023-08-16 DIAGNOSIS — J44.9 CHRONIC OBSTRUCTIVE PULMONARY DISEASE, UNSPECIFIED COPD TYPE (HCC): ICD-10-CM

## 2023-08-16 DIAGNOSIS — G89.29 CHRONIC BILATERAL LOW BACK PAIN WITHOUT SCIATICA: ICD-10-CM

## 2023-08-16 DIAGNOSIS — M54.50 CHRONIC BILATERAL LOW BACK PAIN WITHOUT SCIATICA: ICD-10-CM

## 2023-08-16 DIAGNOSIS — G47.00 INSOMNIA, UNSPECIFIED TYPE: ICD-10-CM

## 2023-08-16 DIAGNOSIS — I73.9 PERIPHERAL VASCULAR DISEASE OF EXTREMITY WITH CLAUDICATION (HCC): ICD-10-CM

## 2023-08-16 PROCEDURE — 99309 SBSQ NF CARE MODERATE MDM 30: CPT | Performed by: NURSE PRACTITIONER

## 2023-08-21 ENCOUNTER — SNF DISCHARGE (OUTPATIENT)
Dept: INTERNAL MEDICINE CLINIC | Age: 78
End: 2023-08-21

## 2023-08-21 VITALS
OXYGEN SATURATION: 97 % | DIASTOLIC BLOOD PRESSURE: 67 MMHG | TEMPERATURE: 98 F | SYSTOLIC BLOOD PRESSURE: 127 MMHG | RESPIRATION RATE: 18 BRPM | HEART RATE: 76 BPM

## 2023-08-21 DIAGNOSIS — G89.29 CHRONIC BILATERAL LOW BACK PAIN WITHOUT SCIATICA: ICD-10-CM

## 2023-08-21 DIAGNOSIS — N39.0 URINARY TRACT INFECTION WITHOUT HEMATURIA, SITE UNSPECIFIED: ICD-10-CM

## 2023-08-21 DIAGNOSIS — I73.9 PERIPHERAL VASCULAR DISEASE OF EXTREMITY WITH CLAUDICATION (HCC): ICD-10-CM

## 2023-08-21 DIAGNOSIS — J44.9 CHRONIC OBSTRUCTIVE PULMONARY DISEASE, UNSPECIFIED COPD TYPE (HCC): ICD-10-CM

## 2023-08-21 DIAGNOSIS — K30 INDIGESTION: ICD-10-CM

## 2023-08-21 DIAGNOSIS — M54.50 CHRONIC BILATERAL LOW BACK PAIN WITHOUT SCIATICA: ICD-10-CM

## 2023-08-21 DIAGNOSIS — M25.511 ACUTE PAIN OF RIGHT SHOULDER: Primary | ICD-10-CM

## 2023-08-21 DIAGNOSIS — R53.1 WEAKNESS: ICD-10-CM

## 2023-08-21 DIAGNOSIS — G47.00 INSOMNIA, UNSPECIFIED TYPE: ICD-10-CM

## 2023-10-05 NOTE — CONSULTS
[facility]  Report of Inpatient Wound Care Consultation    Alfredia Marrow Patient Status:  Observation    3/1/1945 MRN [id number]   [facility] 3SW-A Attending Franky Parker MD   Hosp Day # 0 PCP No primary care provider on file. PLAN OF CARE:   Cleansed wound with saline solution. Applied sorbact and wet to dry gauze covered with ABD pad, kerlix and ace wrap. Discussed with pt and Dr. Matt Santillan ordering a wound vac. Will order wound vac for application on Monday 6/34.  Wet to  Albendazole Pregnancy And Lactation Text: This medication is Pregnancy Category C and it isn't known if it is safe during pregnancy. It is also excreted in breast milk.

## 2023-10-09 ENCOUNTER — HOSPITAL ENCOUNTER (INPATIENT)
Facility: HOSPITAL | Age: 78
LOS: 2 days | Discharge: HOSPICE | End: 2023-10-13
Attending: EMERGENCY MEDICINE | Admitting: HOSPITALIST

## 2023-10-09 ENCOUNTER — APPOINTMENT (OUTPATIENT)
Dept: GENERAL RADIOLOGY | Facility: HOSPITAL | Age: 78
End: 2023-10-09
Attending: EMERGENCY MEDICINE

## 2023-10-09 PROBLEM — R53.1 WEAKNESS GENERALIZED: Status: ACTIVE | Noted: 2023-01-01

## 2023-10-09 PROBLEM — R63.0 ANOREXIC: Status: ACTIVE | Noted: 2023-01-01

## 2023-10-09 PROBLEM — G89.29 OTHER CHRONIC PAIN: Status: ACTIVE | Noted: 2023-01-01

## 2023-10-09 PROBLEM — K59.00 CONSTIPATION, UNSPECIFIED CONSTIPATION TYPE: Status: ACTIVE | Noted: 2023-01-01

## 2023-10-09 PROBLEM — K59.00 CONSTIPATION, UNSPECIFIED CONSTIPATION TYPE: Status: ACTIVE | Noted: 2023-10-09

## 2023-10-09 PROBLEM — R63.0 ANOREXIC: Status: ACTIVE | Noted: 2023-10-09

## 2023-10-09 PROBLEM — G89.29 OTHER CHRONIC PAIN: Status: ACTIVE | Noted: 2023-10-09

## 2023-10-09 PROBLEM — R53.1 WEAKNESS GENERALIZED: Status: ACTIVE | Noted: 2023-10-09

## 2023-10-09 NOTE — ED INITIAL ASSESSMENT (HPI)
Snf/jeovanny called ems for constipation  Pt takes norco for pain  A/o x 4  Above knee amp LLE 2018   Daughters notified

## 2023-10-10 ENCOUNTER — APPOINTMENT (OUTPATIENT)
Dept: CT IMAGING | Facility: HOSPITAL | Age: 78
End: 2023-10-10
Attending: INTERNAL MEDICINE

## 2023-10-10 PROBLEM — E83.52 HYPERCALCEMIA: Status: ACTIVE | Noted: 2023-10-10

## 2023-10-10 PROBLEM — M89.9 BONE LESION: Status: ACTIVE | Noted: 2023-10-10

## 2023-10-10 PROBLEM — E83.52 HYPERCALCEMIA: Status: ACTIVE | Noted: 2023-01-01

## 2023-10-10 PROBLEM — Z85.3 HISTORY OF LEFT BREAST CANCER: Status: ACTIVE | Noted: 2023-10-10

## 2023-10-10 PROBLEM — Z85.3 HISTORY OF LEFT BREAST CANCER: Status: ACTIVE | Noted: 2023-01-01

## 2023-10-10 PROBLEM — M89.9 BONE LESION: Status: ACTIVE | Noted: 2023-01-01

## 2023-10-10 NOTE — PROGRESS NOTES
NURSING ADMISSION NOTE      Patient admitted via Cart  Oriented to room. Safety precautions initiated. Bed in low position. Call light in reach. Received pt A&Ox3-4. Anxious and tearful at times. Depressed - psych liaison to [facility]ash.  on 2L, Hx of SHYAM - no cpap and/or home O2. NSR/ST on tele. Lovenox for VTE prophylaxis. Incontinent with purwick in place. Briefed. Pt had BM in ED s/p enema. Another BM after arriving to floor. Pt c/o pain to rectum - excoriated, barrier cream applied. Premedication required to move pt in bed. Unable to move upper extremities d/t L clavicle fracture and R shoulder injury - pt will be 1:1 feed. Bedrest until further notice. Pt to be evaluated by PT/OT.

## 2023-10-10 NOTE — ED QUICK NOTES
Orders for admission, patient is aware of plan and ready to go upstairs. Any questions, please call ED RN Sidra Estrada at extension [phone].      Patient Covid vaccination status: Fully vaccinated     COVID Test Ordered in ED: Rapid SARS-CoV-2 by PCR    COVID Suspicion at Admission: N/A    Running Infusions:    None    Mental Status/LOC at time of transport: a/ox3    Other pertinent information: ABOVE KNEE AMP AT LLE    CIWA score: N/A   NIH score:  N/A

## 2023-10-10 NOTE — PROGRESS NOTES
0540: Pt had 150mL in urine canister, however feeling like not emptying bladder completely. Bladder scan showing >500. Discussed need for straight cath - pt upset and adamant about getting sleep instead. Education provided. Pt agreeable with following up within the hour. 0630: Pt unable to void. Purwick removed. Bladder scan done, 613. Pt agreeable to straight cath procedure. 800mL removed - UA sent.

## 2023-10-10 NOTE — CM/SW NOTE
Per [facility], pt's insurance is not managed by [facility] [facility]; therefore, MM[facility] will submit for insurance auth and let us know when they get it.

## 2023-10-10 NOTE — PHYSICAL THERAPY NOTE
PHYSICAL THERAPY EVALUATION - INPATIENT     Room Number: 510/510-A  Evaluation Date: 10/10/2023  Type of Evaluation: Initial  Physician Order: PT Eval and Treat    Presenting Problem: Constipation, back pain, shoulder pain  Co-Morbidities : L AKA 2018, Lupus, recent L clavicle fracture  Reason for Therapy: Mobility Dysfunction and Discharge Planning    History related to current admission: Patient is a 69year old female admitted on 10/9/2023 from [facility] for constipation. Recent Admissions:   ED Visit: 7/31/2023: rec/DC DANICA      ASSESSMENT   In this PT evaluation, the patient presents with the following impairments decreased strength, functional mobility, trunk control, endurance, balance. These impairments and comorbidities manifest themselves as functional limitations in independent bed mobility, transfers, and gait. The patient is below baseline and would benefit from skilled inpatient PT to address the above deficits to assist patient in returning to prior to level of function. Functional outcome measures completed include AMPAC. The AM-PAC '6-Clicks' Inpatient Basic Mobility Short Form was completed and this patient is demonstrating a Approx Degree of Impairment: 100%  degree of impairment in mobility. Research supports that patients with this level of impairment may benefit from DANICA, pending pt ability to participate in and tolerate therapy. DISCHARGE RECOMMENDATIONS  PT Discharge Recommendations: Sub-acute rehabilitation (Trial)    PLAN  PT Treatment Plan: Bed mobility; Body mechanics; Endurance; Energy conservation;Patient education; Family education;Gait training;Balance training;Transfer training;Strengthening;Range of motion; Neuromuscular re-educate  Rehab Potential : Guarded  Frequency (Obs):  (2-3x/week)  Number of Visits to Meet Established Goals: Trial      CURRENT GOALS    Goal #1 Patient is able to demonstrate supine - sit EOB @ level: maximum assistance     Goal #2 Patient is able to demonstrate transfers EOB to/from Chair/Wheelchair at assistance level: maximum assistance     Goal #3 Patient will be independent with HEP program of BUE/BLE     Goal #4 Pt will be able to sit EOB with 5min of CGA sitting balance   Goal #5    Goal #6    Goal Comments: Goals established on 10/10/2023    HOME SITUATION  Type of Home: [address]: One level                   Drives: No  Patient Owned Equipment: Wheelchair       Prior Level of [place]: Pt reports that prior to July/August, she was independent with stand-pivot transfers to her wheelchair, living alone. Has a home care aide 2x/week. Reports that since being at [facility] since Aug 31st, she has only done bed level exercises, and has been utilizing bed pan/diaper for toileting. Her goal is to return home independently.      SUBJECTIVE  \"I have arthritis in my back, you need to lower be back down\"       OBJECTIVE  Precautions: Bed/chair alarm  Fall Risk: High fall risk    WEIGHT BEARING RESTRICTION  Weight Bearing Restriction: L upper extremity (Recent L clavicle fracture, unknown WB status)     L Upper Extremity:  (Unknown, will require clarification if and when pt begins to participate in therapy)          PAIN ASSESSMENT  Rating: Unable to rate  Location: back, shoulders  Management Techniques: Repositioning    COGNITION  Awareness of Errors:  decreased awareness of errors   Awareness of Deficits:  decreased awareness of deficits  Problem Solving:  assistance required to identify errors made, assistance required to generate solutions, and assistance required to implement solutions    RANGE OF MOTION AND STRENGTH ASSESSMENT   See OT note for UE assessment    Lower extremity ROM is within functional limits      Lower extremity strength is grossly 4/5 on RLE MMT of DF, PF, knee flexion, extension; 2+/5 R hip flexion; L AKA      BALANCE  Static Sitting: Not tested  Dynamic Sitting: Not tested  Static Standing: Not tested  Dynamic Standing: Not tested    ADDITIONAL TESTS                                    ACTIVITY TOLERANCE  Pulse: 76  Heart Rate Source: Monitor                   O2 WALK  Oxygen Therapy  At rest oxygen flow (liters per minute): 1    NEUROLOGICAL FINDINGS                        AM-PAC '6-Clicks' INPATIENT SHORT FORM - BASIC MOBILITY  How much difficulty does the patient currently have. .. Patient Difficulty: Turning over in bed (including adjusting bedclothes, sheets and blankets)?: Unable   Patient Difficulty: Sitting down on and standing up from a chair with arms (e.g., wheelchair, bedside commode, etc.): Unable   Patient Difficulty: Moving from lying on back to sitting on the side of the bed?: Unable   How much help from another person does the patient currently need. .. Help from Another: Moving to and from a bed to a chair (including a wheelchair)?: Total   Help from Another: Need to walk in hospital room?: Total   Help from Another: Climbing 3-5 steps with a railing?: Total       AM-PAC Score:  Raw Score: 6   Approx Degree of Impairment: 100%   Standardized Score (AM-PAC Scale): 23.55   CMS Modifier (G-Code): CN    FUNCTIONAL ABILITY STATUS  Gait Assessment   Functional Mobility/Gait Assessment  Gait Assistance: Not tested    Skilled Therapy Provided     Bed Mobility:  Rolling: NT  Supine to sit: NT   Sit to supine: NT     Transfer Mobility:  Sit to stand: NT   Stand to sit: NT  Gait = NT    Therapist's Comments: RN cleared for session. Pt agreeable for therapy, received supine. Pt refusing any functional mobility today, citing increased back pain with sitting up. Pt educated on necessity of participation in therapy and functional mobility in order to qualify and benefit from DANICA therapies. Pt verbalizes understanding and reports enjoying her therapy sessions at [facility], however not agreeable to participate today. Pt only tolerated sitting at 45 degree angle for <5 minutes.  Instructed to call for nursing staff for any needs and mobility. Exercise/Education Provided:  ROM  Education     Patient End of Session: In bed;Needs met;Call light within reach;RN aware of session/findings; All patient questions and concerns addressed; Alarm set; Discussed recommendations with /      Patient Evaluation Complexity Level:  History Moderate - 1 or 2 personal factors and/or co-morbidities   Examination of body systems Low - addressing 1-2 elements   Clinical Presentation Moderate - Evolving   Clinical Decision Making Moderate - Evolving       PT Session Time: 20 minutes

## 2023-10-10 NOTE — CM/SW NOTE
Pt is a 68 yo female admitted for weakness and constipation. Pt came from The [place] [address] where she has been for DANICA since the end of July. She says she is very unhappy at [facility] and does not want to go back. Pt normally lives alone at the [facility]. PT is still recommending DANICA. Spoke with pt's dtrs to would like pt to go to [facility] for DANICA. DSC to send referral to [facility] via [facility]. Pt will need [employer] auth from her [place] to go to [facility] at [facility].   SW to f/u with finding out if [facility] can accept pt for DANICA.     10/10/23 1300   CM/SW Referral Data   Referral Source Physician   Reason for Referral Discharge planning   Informant Patient;Daughter   Patient Info   Patient's Home Environment Condo/Apt with elevator   Patient lives with Alone   Patient Status Prior to Admission   Services in place prior to admission [address]   Discharge Needs   Anticipated D/C needs Subacute rehab

## 2023-10-10 NOTE — PLAN OF CARE
Problem: GASTROINTESTINAL - ADULT  Goal: Maintains or returns to baseline bowel function  Description: INTERVENTIONS:  - Assess bowel function  - Maintain adequate hydration with IV or PO as ordered and tolerated  - Evaluate effectiveness of GI medications  - Encourage mobilization and activity  - Obtain nutritional consult as needed  - Establish a toileting routine/schedule  - Consider collaborating with pharmacy to review patient's medication profile  Outcome: Progressing     Problem: SKIN/TISSUE INTEGRITY - ADULT  Goal: Skin integrity remains intact  Description: INTERVENTIONS  - Assess and document risk factors for pressure ulcer development  - Assess and document skin integrity  - Monitor for areas of redness and/or skin breakdown  - Initiate interventions, skin care algorithm/standards of care as needed  Outcome: Progressing  Goal: Oral mucous membranes remain intact  Description: INTERVENTIONS  - Assess oral mucosa and hygiene practices  - Implement preventative oral hygiene regimen  - Implement oral medicated treatments as ordered  Outcome: Progressing     Problem: MUSCULOSKELETAL - ADULT  Goal: Return mobility to safest level of function  Description: INTERVENTIONS:  - Assess patient stability and activity tolerance for standing, transferring and ambulating w/ or w/o assistive devices  - Assist with transfers and ambulation using safe patient handling equipment as needed  - Ensure adequate protection for wounds/incisions during mobilization  - Obtain PT/OT consults as needed  - Advance activity as appropriate  - Communicate ordered activity level and limitations with patient/family  Outcome: Progressing  Goal: Maintain proper alignment of affected body part  Description: INTERVENTIONS:  - Support and protect limb and body alignment per provider's orders  - Instruct and reinforce with patient and family use of appropriate assistive device and precautions (e.g. spinal or hip dislocation precautions)  Outcome: Progressing     Problem: Impaired Functional Mobility  Goal: Achieve highest/safest level of mobility/gait  Description: Interventions:  - Assess patient's functional ability and stability  - Promote increasing activity/tolerance for mobility and gait  - Educate and engage patient/family in tolerated activity level and precautions  - Recommend use of total lift for transfers  Outcome: Progressing     Problem: Impaired Activities of Daily Living  Goal: Achieve highest/safest level of independence in self care  Description: Interventions:  - Assess ability and encourage patient to participate in ADLs to maximize function  - Promote sitting position while performing ADLs such as feeding, grooming, and bathing  - Educate and encourage patient/family in tolerated functional activity level and precautions during self-care  - Encourage patient to incorporate impaired side during daily activities to promote function  Outcome: Progressing     Problem: SAFETY ADULT - FALL  Goal: Free from fall injury  Description: INTERVENTIONS:  - Assess pt frequently for physical needs  - Identify cognitive and physical deficits and behaviors that affect risk of falls.   - [place] fall precautions as indicated by assessment.  - Educate pt/family on patient safety including physical limitations  - Instruct pt to call for assistance with activity based on assessment  - Modify environment to reduce risk of injury  - Provide assistive devices as appropriate  - Consider OT/PT consult to assist with strengthening/mobility  - Encourage toileting schedule  Outcome: Progressing     Problem: PAIN - ADULT  Goal: Verbalizes/displays adequate comfort level or patient's stated pain goal  Description: INTERVENTIONS:  - Encourage pt to monitor pain and request assistance  - Assess pain using appropriate pain scale  - Administer analgesics based on type and severity of pain and evaluate response  - Implement non-pharmacological measures as appropriate and evaluate response  - Consider cultural and social influences on pain and pain management  - Manage/alleviate anxiety  - Utilize distraction and/or relaxation techniques  - Monitor for opioid side effects  - Notify MD/LIP if interventions unsuccessful or patient reports new pain  - Anticipate increased pain with activity and pre-medicate as appropriate  Outcome: Progressing

## 2023-10-10 NOTE — CM/SW NOTE
Department  notified of request for [facility] referrals started. Assigned CM/SW to follow up with pt/family on further discharge planning.     [facility]  [facility]

## 2023-10-10 NOTE — CM/SW NOTE
Elisha Suarez accepted pt. MMN reserved. [facility] to submit for [employer] for pt to go to [facility].

## 2023-10-10 NOTE — PLAN OF CARE
Pt alert and oriented x4. 1L for comfort. Tele, SR. Lovenox. C/o pain, see mar. CT chest/abd/pelvis ordered and pt finished prep, but pt refused CT d/t pain. RN explained that CT is needed but pt still refused. MD aware. IVF. PT/OT recommending [facility]. IV abx. Pt and family updated on poc. Call light in reach. Safety precaution in place. All needs are met at this time.     Problem: GASTROINTESTINAL - ADULT  Goal: Maintains or returns to baseline bowel function  Description: INTERVENTIONS:  - Assess bowel function  - Maintain adequate hydration with IV or PO as ordered and tolerated  - Evaluate effectiveness of GI medications  - Encourage mobilization and activity  - Obtain nutritional consult as needed  - Establish a toileting routine/schedule  - Consider collaborating with pharmacy to review patient's medication profile  Outcome: Progressing     Problem: SKIN/TISSUE INTEGRITY - ADULT  Goal: Skin integrity remains intact  Description: INTERVENTIONS  - Assess and document risk factors for pressure ulcer development  - Assess and document skin integrity  - Monitor for areas of redness and/or skin breakdown  - Initiate interventions, skin care algorithm/standards of care as needed  Outcome: Progressing  Goal: Oral mucous membranes remain intact  Description: INTERVENTIONS  - Assess oral mucosa and hygiene practices  - Implement preventative oral hygiene regimen  - Implement oral medicated treatments as ordered  Outcome: Progressing     Problem: MUSCULOSKELETAL - ADULT  Goal: Return mobility to safest level of function  Description: INTERVENTIONS:  - Assess patient stability and activity tolerance for standing, transferring and ambulating w/ or w/o assistive devices  - Assist with transfers and ambulation using safe patient handling equipment as needed  - Ensure adequate protection for wounds/incisions during mobilization  - Obtain PT/OT consults as needed  - Advance activity as appropriate  - Communicate ordered activity level and limitations with patient/family  Outcome: Progressing  Goal: Maintain proper alignment of affected body part  Description: INTERVENTIONS:  - Support and protect limb and body alignment per provider's orders  - Instruct and reinforce with patient and family use of appropriate assistive device and precautions (e.g. spinal or hip dislocation precautions)  Outcome: Progressing     Problem: Impaired Functional Mobility  Goal: Achieve highest/safest level of mobility/gait  Description: Interventions:  - Assess patient's functional ability and stability  - Promote increasing activity/tolerance for mobility and gait  - Educate and engage patient/family in tolerated activity level and precautions    Outcome: Progressing     Problem: Impaired Activities of Daily Living  Goal: Achieve highest/safest level of independence in self care  Description: Interventions:  - Assess ability and encourage patient to participate in ADLs to maximize function  - Promote sitting position while performing ADLs such as feeding, grooming, and bathing  - Educate and encourage patient/family in tolerated functional activity level and precautions during self-care    Outcome: Progressing     Problem: SAFETY ADULT - FALL  Goal: Free from fall injury  Description: INTERVENTIONS:  - Assess pt frequently for physical needs  - Identify cognitive and physical deficits and behaviors that affect risk of falls.   - [place] fall precautions as indicated by assessment.  - Educate pt/family on patient safety including physical limitations  - Instruct pt to call for assistance with activity based on assessment  - Modify environment to reduce risk of injury  - Provide assistive devices as appropriate  - Consider OT/PT consult to assist with strengthening/mobility  - Encourage toileting schedule  Outcome: Progressing     Problem: PAIN - ADULT  Goal: Verbalizes/displays adequate comfort level or patient's stated pain goal  Description: INTERVENTIONS:  - Encourage pt to monitor pain and request assistance  - Assess pain using appropriate pain scale  - Administer analgesics based on type and severity of pain and evaluate response  - Implement non-pharmacological measures as appropriate and evaluate response  - Consider cultural and social influences on pain and pain management  - Manage/alleviate anxiety  - Utilize distraction and/or relaxation techniques  - Monitor for opioid side effects  - Notify MD/LIP if interventions unsuccessful or patient reports new pain  - Anticipate increased pain with activity and pre-medicate as appropriate  Outcome: Progressing

## 2023-10-11 ENCOUNTER — APPOINTMENT (OUTPATIENT)
Dept: CT IMAGING | Facility: HOSPITAL | Age: 78
End: 2023-10-11
Attending: INTERNAL MEDICINE

## 2023-10-11 PROBLEM — Z71.89 GOALS OF CARE, COUNSELING/DISCUSSION: Status: ACTIVE | Noted: 2023-10-11

## 2023-10-11 PROBLEM — G89.3 PAIN FROM BONE METASTASES (HCC): Status: ACTIVE | Noted: 2023-10-11

## 2023-10-11 PROBLEM — R52 INTRACTABLE PAIN: Status: ACTIVE | Noted: 2023-01-01

## 2023-10-11 PROBLEM — G89.3 PAIN FROM BONE METASTASES (HCC): Status: ACTIVE | Noted: 2023-01-01

## 2023-10-11 PROBLEM — Z51.5 PALLIATIVE CARE BY SPECIALIST: Status: ACTIVE | Noted: 2023-01-01

## 2023-10-11 PROBLEM — C79.51 PAIN FROM BONE METASTASES (HCC): Status: ACTIVE | Noted: 2023-10-11

## 2023-10-11 PROBLEM — R63.4 UNINTENTIONAL WEIGHT LOSS OF MORE THAN 10% BODY WEIGHT WITHIN 6 MONTHS: Status: ACTIVE | Noted: 2023-01-01

## 2023-10-11 PROBLEM — C79.51 PAIN FROM BONE METASTASES (HCC): Status: ACTIVE | Noted: 2023-01-01

## 2023-10-11 PROBLEM — R63.4 UNINTENTIONAL WEIGHT LOSS OF MORE THAN 10% BODY WEIGHT WITHIN 6 MONTHS: Status: ACTIVE | Noted: 2023-10-11

## 2023-10-11 PROBLEM — Z71.89 GOALS OF CARE, COUNSELING/DISCUSSION: Status: ACTIVE | Noted: 2023-01-01

## 2023-10-11 PROBLEM — Z51.5 PALLIATIVE CARE BY SPECIALIST: Status: ACTIVE | Noted: 2023-10-11

## 2023-10-11 PROBLEM — R52 INTRACTABLE PAIN: Status: ACTIVE | Noted: 2023-10-11

## 2023-10-11 NOTE — PLAN OF CARE
Pt alert and oriented x4. 2L for comfort. Tele, SR. Lovenox. C/o pain, see mar. IVF. PO abx. Pt and family updated on poc. Call light in reach. Safety precaution in place. All needs are met at this time. Addendum: CT results are read. Vascular surgery consulted during shift. IV decadron given.     Problem: GASTROINTESTINAL - ADULT  Goal: Maintains or returns to baseline bowel function  Description: INTERVENTIONS:  - Assess bowel function  - Maintain adequate hydration with IV or PO as ordered and tolerated  - Evaluate effectiveness of GI medications  - Encourage mobilization and activity  - Obtain nutritional consult as needed  - Establish a toileting routine/schedule  - Consider collaborating with pharmacy to review patient's medication profile  Outcome: Progressing     Problem: SKIN/TISSUE INTEGRITY - ADULT  Goal: Skin integrity remains intact  Description: INTERVENTIONS  - Assess and document risk factors for pressure ulcer development  - Assess and document skin integrity  - Monitor for areas of redness and/or skin breakdown  - Initiate interventions, skin care algorithm/standards of care as needed  Outcome: Progressing  Goal: Oral mucous membranes remain intact  Description: INTERVENTIONS  - Assess oral mucosa and hygiene practices  - Implement preventative oral hygiene regimen  - Implement oral medicated treatments as ordered  Outcome: Progressing     Problem: MUSCULOSKELETAL - ADULT  Goal: Return mobility to safest level of function  Description: INTERVENTIONS:  - Assess patient stability and activity tolerance for standing, transferring and ambulating w/ or w/o assistive devices  - Assist with transfers and ambulation using safe patient handling equipment as needed  - Ensure adequate protection for wounds/incisions during mobilization  - Obtain PT/OT consults as needed  - Advance activity as appropriate  - Communicate ordered activity level and limitations with patient/family  Outcome: Progressing  Goal: Maintain proper alignment of affected body part  Description: INTERVENTIONS:  - Support and protect limb and body alignment per provider's orders  - Instruct and reinforce with patient and family use of appropriate assistive device and precautions (e.g. spinal or hip dislocation precautions)  Outcome: Progressing     Problem: Impaired Functional Mobility  Goal: Achieve highest/safest level of mobility/gait  Description: Interventions:  - Assess patient's functional ability and stability  - Promote increasing activity/tolerance for mobility and gait  - Educate and engage patient/family in tolerated activity level and precautions    Outcome: Progressing     Problem: Impaired Activities of Daily Living  Goal: Achieve highest/safest level of independence in self care  Description: Interventions:  - Assess ability and encourage patient to participate in ADLs to maximize function  - Promote sitting position while performing ADLs such as feeding, grooming, and bathing  - Educate and encourage patient/family in tolerated functional activity level and precautions during self-care    Outcome: Progressing     Problem: SAFETY ADULT - FALL  Goal: Free from fall injury  Description: INTERVENTIONS:  - Assess pt frequently for physical needs  - Identify cognitive and physical deficits and behaviors that affect risk of falls.   - [place] fall precautions as indicated by assessment.  - Educate pt/family on patient safety including physical limitations  - Instruct pt to call for assistance with activity based on assessment  - Modify environment to reduce risk of injury  - Provide assistive devices as appropriate  - Consider OT/PT consult to assist with strengthening/mobility  - Encourage toileting schedule  Outcome: Progressing     Problem: PAIN - ADULT  Goal: Verbalizes/displays adequate comfort level or patient's stated pain goal  Description: INTERVENTIONS:  - Encourage pt to monitor pain and request assistance  - Assess pain using appropriate pain scale  - Administer analgesics based on type and severity of pain and evaluate response  - Implement non-pharmacological measures as appropriate and evaluate response  - Consider cultural and social influences on pain and pain management  - Manage/alleviate anxiety  - Utilize distraction and/or relaxation techniques  - Monitor for opioid side effects  - Notify MD/LIP if interventions unsuccessful or patient reports new pain  - Anticipate increased pain with activity and pre-medicate as appropriate  Outcome: Progressing

## 2023-10-11 NOTE — PLAN OF CARE
Pt is A&Ox 4. Vital signs stable, afebrile. Gets morphine for pain. SPO2 maintained on 2L. Continuous pulse ox. Denies any SOB, cough. Tele. Lovenox. Reg diet; 1-1 feed. Denies any n/v/d. Incontinent and retaining urine; straight cathed twice overnight. Bed bound, left leg amputation. .9NS @ 125ml/hr. IV rocephin. Will continue to monitor. Pt is updated with plan of care.     Problem: GASTROINTESTINAL - ADULT  Goal: Maintains or returns to baseline bowel function  Description: INTERVENTIONS:  - Assess bowel function  - Maintain adequate hydration with IV or PO as ordered and tolerated  - Evaluate effectiveness of GI medications  - Encourage mobilization and activity  - Obtain nutritional consult as needed  - Establish a toileting routine/schedule  - Consider collaborating with pharmacy to review patient's medication profile  Outcome: Progressing     Problem: SKIN/TISSUE INTEGRITY - ADULT  Goal: Skin integrity remains intact  Description: INTERVENTIONS  - Assess and document risk factors for pressure ulcer development  - Assess and document skin integrity  - Monitor for areas of redness and/or skin breakdown  - Initiate interventions, skin care algorithm/standards of care as needed  Outcome: Progressing  Goal: Oral mucous membranes remain intact  Description: INTERVENTIONS  - Assess oral mucosa and hygiene practices  - Implement preventative oral hygiene regimen  - Implement oral medicated treatments as ordered  Outcome: Progressing     Problem: MUSCULOSKELETAL - ADULT  Goal: Return mobility to safest level of function  Description: INTERVENTIONS:  - Assess patient stability and activity tolerance for standing, transferring and ambulating w/ or w/o assistive devices  - Assist with transfers and ambulation using safe patient handling equipment as needed  - Ensure adequate protection for wounds/incisions during mobilization  - Obtain PT/OT consults as needed  - Advance activity as appropriate  - Communicate ordered activity level and limitations with patient/family  Outcome: Progressing  Goal: Maintain proper alignment of affected body part  Description: INTERVENTIONS:  - Support and protect limb and body alignment per provider's orders  - Instruct and reinforce with patient and family use of appropriate assistive device and precautions (e.g. spinal or hip dislocation precautions)  Outcome: Progressing     Problem: Impaired Functional Mobility  Goal: Achieve highest/safest level of mobility/gait  Description: Interventions:  - Assess patient's functional ability and stability  - Promote increasing activity/tolerance for mobility and gait  - Educate and engage patient/family in tolerated activity level and precautions    Outcome: Progressing     Problem: Impaired Activities of Daily Living  Goal: Achieve highest/safest level of independence in self care  Description: Interventions:  - Assess ability and encourage patient to participate in ADLs to maximize function  - Promote sitting position while performing ADLs such as feeding, grooming, and bathing  - Educate and encourage patient/family in tolerated functional activity level and precautions during self-care    Outcome: Progressing     Problem: SAFETY ADULT - FALL  Goal: Free from fall injury  Description: INTERVENTIONS:  - Assess pt frequently for physical needs  - Identify cognitive and physical deficits and behaviors that affect risk of falls.   - [place] fall precautions as indicated by assessment.  - Educate pt/family on patient safety including physical limitations  - Instruct pt to call for assistance with activity based on assessment  - Modify environment to reduce risk of injury  - Provide assistive devices as appropriate  - Consider OT/PT consult to assist with strengthening/mobility  - Encourage toileting schedule  Outcome: Progressing     Problem: PAIN - ADULT  Goal: Verbalizes/displays adequate comfort level or patient's stated pain goal  Description: INTERVENTIONS:  - Encourage pt to monitor pain and request assistance  - Assess pain using appropriate pain scale  - Administer analgesics based on type and severity of pain and evaluate response  - Implement non-pharmacological measures as appropriate and evaluate response  - Consider cultural and social influences on pain and pain management  - Manage/alleviate anxiety  - Utilize distraction and/or relaxation techniques  - Monitor for opioid side effects  - Notify MD/LIP if interventions unsuccessful or patient reports new pain  - Anticipate increased pain with activity and pre-medicate as appropriate  Outcome: Progressing

## 2023-10-11 NOTE — PROGRESS NOTES
10/11/23 5572   Clinical Encounter Type   Visited With Patient and family together   Routine Visit Introduction   Referral From Other (Comment)   Referral To    Taxonomy   Intended Effects Demonstrate caring and concern   Methods Offer support   Interventions Assist someone with Advance Directives       POA Forms were left with the patient.     Resident   Pedro Luis Woodsing

## 2023-10-11 NOTE — PROGRESS NOTES
[facility]  on consult for concerns of anxiety/depression according to PHQ-4 score; per chart review, patient was making statements to nurse yesterday of wanting to die and no longer wanting to live with pain. Upon evaluation, patient was cooperative; daughter at bedside. Pt reported \"I feel a lot better than yesterday, a lot better\". Pt states that her anxiety and depressive symptoms are directly correlated to her physical health status. Patient denied suicidal ideation. Daughter denied any safety concerns, Vito Guo had a bad day yesterday\". Patient reported good support system; pt states she will be discharging to rehab where they will be able to provide her with psychotherapy. Patient denied setting up any appointments at this time due to unknown location of discharge ([facility]). Patient reported no other concerns at this time, encouraged to ask for writer with any additional questions or concerns.

## 2023-10-11 NOTE — PROGRESS NOTES
Pt very tearful at start of shift. Pt stated \"I don't want to live with this pain, this is no life. I just want this to end\". Pt stated she has been dealing with depression for some time now and it has been getting worse the past few months. Psych liaison on consult. Pt unsure is she wants to continue with further oncology testing and/or any future treatments. Emotional support provided. SW consulted for goals of care.

## 2023-10-11 NOTE — PROGRESS NOTES
Vascular Surgery Note    Consult received, discussed with reading radiologist. Savannah Hy to see images in Epic but I was able to log on to the PACs system. 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 No signs of rupture.  Formal consultation to be done tomorrow     Devon Rader MD  10/11/23  5:54 PM

## 2023-10-12 NOTE — PLAN OF CARE
Pt Aox1-2., x4 b/l Confused/agitated, MD notified- continue to monitor. Speech clear. Mentation improved over night, Aox2-3. U/L dentures. Spo2 >90% on 2L prn. IV steroids. NSR/ST on tele, EP, lovenox. Incontinent x2, briefed/purewick. C/o shoulder pain b/l with movement. 1/1 feed. Regular diet. Refused oral meds, pt requested to speak with dtr, dtr contacted and updated on poc. IVF. L AKA. Call light within reach, safety precautions in place. No further pt needs at this time.      Problem: GASTROINTESTINAL - ADULT  Goal: Maintains or returns to baseline bowel function  Description: INTERVENTIONS:  - Assess bowel function  - Maintain adequate hydration with IV or PO as ordered and tolerated  - Evaluate effectiveness of GI medications  - Encourage mobilization and activity  - Obtain nutritional consult as needed  - Establish a toileting routine/schedule  - Consider collaborating with pharmacy to review patient's medication profile  Outcome: Progressing     Problem: SKIN/TISSUE INTEGRITY - ADULT  Goal: Skin integrity remains intact  Description: INTERVENTIONS  - Assess and document risk factors for pressure ulcer development  - Assess and document skin integrity  - Monitor for areas of redness and/or skin breakdown  - Initiate interventions, skin care algorithm/standards of care as needed  Outcome: Progressing  Goal: Oral mucous membranes remain intact  Description: INTERVENTIONS  - Assess oral mucosa and hygiene practices  - Implement preventative oral hygiene regimen  - Implement oral medicated treatments as ordered  Outcome: Progressing     Problem: MUSCULOSKELETAL - ADULT  Goal: Return mobility to safest level of function  Description: INTERVENTIONS:  - Assess patient stability and activity tolerance for standing, transferring and ambulating w/ or w/o assistive devices  - Assist with transfers and ambulation using safe patient handling equipment as needed  - Ensure adequate protection for wounds/incisions during mobilization  - Obtain PT/OT consults as needed  - Advance activity as appropriate  - Communicate ordered activity level and limitations with patient/family  Outcome: Progressing  Goal: Maintain proper alignment of affected body part  Description: INTERVENTIONS:  - Support and protect limb and body alignment per provider's orders  - Instruct and reinforce with patient and family use of appropriate assistive device and precautions (e.g. spinal or hip dislocation precautions)  Outcome: Progressing     Problem: Impaired Functional Mobility  Goal: Achieve highest/safest level of mobility/gait  Description: Interventions:  - Assess patient's functional ability and stability  - Promote increasing activity/tolerance for mobility and gait  - Educate and engage patient/family in tolerated activity level and precautions  - Recommend use of total lift for transfers  Outcome: Progressing     Problem: Impaired Activities of Daily Living  Goal: Achieve highest/safest level of independence in self care  Description: Interventions:  - Assess ability and encourage patient to participate in ADLs to maximize function  - Promote sitting position while performing ADLs such as feeding, grooming, and bathing  - Educate and encourage patient/family in tolerated functional activity level and precautions during self-care  - Encourage patient to incorporate impaired side during daily activities to promote function  Outcome: Progressing     Problem: SAFETY ADULT - FALL  Goal: Free from fall injury  Description: INTERVENTIONS:  - Assess pt frequently for physical needs  - Identify cognitive and physical deficits and behaviors that affect risk of falls.   - Tarzan fall precautions as indicated by assessment.  - Educate pt/family on patient safety including physical limitations  - Instruct pt to call for assistance with activity based on assessment  - Modify environment to reduce risk of injury  - Provide assistive devices as appropriate  - Consider OT/PT consult to assist with strengthening/mobility  - Encourage toileting schedule  Outcome: Progressing     Problem: PAIN - ADULT  Goal: Verbalizes/displays adequate comfort level or patient's stated pain goal  Description: INTERVENTIONS:  - Encourage pt to monitor pain and request assistance  - Assess pain using appropriate pain scale  - Administer analgesics based on type and severity of pain and evaluate response  - Implement non-pharmacological measures as appropriate and evaluate response  - Consider cultural and social influences on pain and pain management  - Manage/alleviate anxiety  - Utilize distraction and/or relaxation techniques  - Monitor for opioid side effects  - Notify MD/LIP if interventions unsuccessful or patient reports new pain  - Anticipate increased pain with activity and pre-medicate as appropriate  Outcome: Progressing     Problem: Delirium  Goal: Minimize duration of delirium  Description: Interventions:  - Encourage use of hearing aids, eye glasses  - Promote highest level of mobility daily  - Provide frequent reorientation  - Promote wakefulness i.e. lights on, blinds open  - Promote sleep, encourage patient's normal rest cycle i.e. lights off, TV off, minimize noise and interruptions  - Encourage family to assist in orientation and promotion of home routines  Outcome: Progressing

## 2023-10-12 NOTE — CM/SW NOTE
DENYS spoke with liaison Chante Laurent at [facility] who stated insurance authorization for DANICA is still pending at this time. DENYS will continue to follow.     DOMINIC Foster  Discharge Planner labor

## 2023-10-12 NOTE — PROGRESS NOTES
10/12/23 8258   Clinical Encounter Type   Visited With Patient and family together;Health care provider   Routine Visit Follow-up   Continue Visiting No   Referral From Other (Comment)   Referral To    Taxonomy   Intended Effects Demonstrate caring and concern   Methods Offer support   Interventions Assist someone with Advance Directives   Trigger for Consult   Trigger for [facility] No     Patient completed a POA.     Resident   [place]

## 2023-10-12 NOTE — IMAGING NOTE
Patient with order for deep bone biopsy. Has imaging that shows multiple deep bone lesions. Spoke with bedside RN Anh. Pt received ASA 81 mg today as well as daily Lovenox dose at 0823. RN states that pt is still processing news of lesions and unsure as to how pt wants to proceed. I explained that I would review order and imaging with IR but that for deep bone bx, ASA is held 5 days and Lovenox has to be held 12 hours. Will discuss with IR and update bedside RN.

## 2023-10-12 NOTE — PLAN OF CARE
Pt received alert and oriented x4. VSS, afebrile. C/o pain to both upper extremities, prn morphine given with relief. All meds given per [facility]. Weaned to 2L NC. Incontinent x2, pt with loose stools. MD aware. Reports abdominal pain is improved. CT deep bone biopsy ordered. Notified Dr. Stephy Tan about holding aspirin and lovenox. Poor appetite. Family at bedside updated on POC. Call light within reach.

## 2023-10-12 NOTE — IMAGING NOTE
Reviewed case with Dr Merlinda Kurk. Deep bone will need to have 5 day hold of ASA. I relayed this information to Anh BROWNE at bedside. She will notify service.

## 2023-10-13 ENCOUNTER — HOSPITAL ENCOUNTER (INPATIENT)
Facility: HOSPITAL | Age: 78
End: 2023-10-13
Attending: HOSPITALIST | Admitting: HOSPITALIST

## 2023-10-13 NOTE — PROGRESS NOTES
Pt Aox4. VSS,afebrile. U/L dentures. Spo2 >90% on 2L . IV steroids. NSR/ST on tele, EP, lovenox. Incontinent x2, briefed/purewick. C/o shoulder pain b/l with movement, PRN meds given. 1/1 feed. Regular diet. IVF. L AKA. Pt updated on POC. Call light within reach, safety precautions in place. No further pt needs at this time.

## 2023-10-13 NOTE — CM/SW NOTE
Palliative Care consult ordered per progress note review of Oncologist Dr. Lazarus Flock will follow up for care planning needs. Update:  1030: Palliative Care Consult order discontinued; CM updated Palliative Care team is already on consult, patient/family meeting with Palliative Care today at 1 PM.  CM available for care planning needs at scheduled meeting today.       1400: Hospice Consult order received; CM called [facility] at [phone] and spoke to [facility] for follow up request.       Glenn Nunez, RN Case Manager [id number]

## 2023-10-13 NOTE — PHYSICAL THERAPY NOTE
IP PT f/u, d/w RN , pt is not appropriate at this time. Will continue to follow peripherally and resume therapy pending pt's [address] decisions.

## 2023-10-13 NOTE — HOSPICE RN NOTE
[facility] team visit for follow up on hospice consult order. Met with patient and daughters Christos Torres and Joe Cooper. Discussed hospice benefit, hospice philosophy, palliative care with questions and concerns addressed. Patient appears appropriate for hospice with dx of metastatic breast cancer and appropriate for inpatient hospice for management of intractable cancer related pain. Patient verbally agreed and deferred to daughter Camila Oliver to sign hospice consent. Contacted Dr. Luana Martinez and [facility] medical director Dr. Prasad Head for initial hospice orders. Orders received and entered. Patient will be transitioned to inpatient hospice bed. Patient needs continued frequent nursing assessments for administration and titration of IV/PO comfort medications. Pain too intense for transfer to lesser hospice setting. Plan is when pain managed well patient will transition to daughters home. [facility] will continue to follow this patient closely for end of life symptom management and to support family. Please call [facility] with any question or concerns.     Jessica Kc RN, [facility] Liaison  [phone] after hours infection including antihistamine use, OTC cough medications as needed, and encouraging oral intake to maintain adequate hydration. Family instructed to return to clinic if concern for worsening, emergence of other symptoms, or failure to improve in the next 3-5 days.

## 2023-10-14 NOTE — PLAN OF CARE
Problem: Patient/Family Goals  Goal: Patient/Family Long Term Goal  Description: Patient's Long Term Goal:   Go home on hospice care    Interventions:  - sw consult for discharge planning  - supportive care  - See additional Care Plan goals for specific interventions  Outcome: Progressing  Goal: Patient/Family Short Term Goal  Description: Patient's Short Term Goal:   10/13/2023 - \"sleep and pain control\"  Interventions:   - Morphine and Ativan as ordered  - See additional Care Plan goals for specific interventions  Outcome: Progressing     Problem: PAIN - ADULT  Goal: Verbalizes/displays adequate comfort level or patient's stated pain goal  Description: INTERVENTIONS:  - Encourage pt to monitor pain and request assistance  - Assess pain using appropriate pain scale  - Administer analgesics based on type and severity of pain and evaluate response  - Implement non-pharmacological measures as appropriate and evaluate response  - Consider cultural and social influences on pain and pain management  - Manage/alleviate anxiety  - Utilize distraction and/or relaxation techniques  - Monitor for opioid side effects  - Notify MD/LIP if interventions unsuccessful or patient reports new pain  - Anticipate increased pain with activity and pre-medicate as appropriate  Outcome: Progressing

## 2023-10-14 NOTE — PROGRESS NOTES
PATIENT SLEPT WELL. MORPHINE AND ATIVAN WERE UTILIZED TO CONTROL PAIN AND ANXIETY. SHE IS REPORTING A  PAIN RELIEF SCORE OF 6/10 OR \"NOT MUCH\". SHE AGREED TO PERICARE AND A FULL LINEN CHANGE. HERRING CATHETER WAS INSERTED TO MINIMIZE PAIN BROUGHT ON BY TURNING BACK AND FORTH DURING DIAPER AND LINEN CHANGES.

## 2023-10-14 NOTE — PROGRESS NOTES
PATIENT A&OX4. HER DAUGHTER IS AT THE BEDSIDE. PATIENT EXTREMELY ANXIOUS. REPORTS SEVERE PAIN WITH MINIMAL MOVEMENT. WANTS  HER PAIN CONTROLED  SO SHE CAN SLEEP. REFUSING TO BE CHECKED FOR INCONTINENCE AT THIS TIME. \"YOU DON'T UNDERSTAND HOW MUCH PAIN I AM IN. NO ONE UNDERSTANDS! YOU ARE GOING TO KILL ME AND I AM DYING ALREADY\". WILL GET PAIN UNDER CONTROL AND LET PATIENT REST FOR NOW. WILL FOLLOW.

## 2023-10-14 NOTE — PLAN OF CARE
Patient is AO x 4. Intermittent pain & anxiety - PRNs administered with relief - see MAR. Patient transitioned to inpatient hospice today. Patient is voiding via purwick, still having soft stools - softeners held this AM. Patient extremely anxious & experiences paint 9-10/10 with any movement - patient declined full linen change but remains agreeable to let staff do rebecca-care when needed. Pre-medication prior to any care involving position changes given. Poor appetite for meals. Abdomen remains soft & non-tender. IV decadron continued to support respiratory symptoms. Patient & daughters at bedside updated on POC, patient rounded on routinely.

## 2023-10-15 NOTE — PROGRESS NOTES
Hospice patient. Alert and oriented x4. 2L for comfort. Morphine gtt started, see mar. Kessler in place. Pt resting comfortably. Pt and family updated on poc. Addendum: programming error, program concentration from 5mg/mL when bag is 1mg/mL. Will decrease rate from 3mg/mL to 1mg/mL and will monitor pain as needed.

## 2023-10-15 NOTE — PROGRESS NOTES
PATIENT A&OX4 DURING HANDOFF. REPORTS SHE IS COMFORTABLE. RATES HER PAIN AT 7/10 BUT SAYS SHE IS ABLE TO REST/SLEEP AT 7/10. MORPHINE DRIP CURRENTLY AT 1MG/HOUR RATE. WILL TITRATE as NEEDED.

## 2023-10-15 NOTE — PLAN OF CARE
Problem: Patient/Family Goals  Goal: Patient/Family Long Term Goal  Description: Patient's Long Term Goal:   Go home on hospice care    Interventions:  - sw consult for discharge planning  - supportive care  - See additional Care Plan goals for specific interventions  Outcome: Progressing  Goal: Patient/Family Short Term Goal  Description: Patient's Short Term Goal:   10/13/2023 - \"sleep and pain control\"  10/14/2023 - KEEP PT COMFORTABLE  Interventions:   - Morphine and Ativan as ordered  - MORPHINE DRIP  - POSITION FOR COMFORT    - See additional Care Plan goals for specific interventions  Outcome: Progressing     Problem: PAIN - ADULT  Goal: Verbalizes/displays adequate comfort level or patient's stated pain goal  Description: INTERVENTIONS:  - Encourage pt to monitor pain and request assistance  - Assess pain using appropriate pain scale  - Administer analgesics based on type and severity of pain and evaluate response  - Implement non-pharmacological measures as appropriate and evaluate response  - Consider cultural and social influences on pain and pain management  - Manage/alleviate anxiety  - Utilize distraction and/or relaxation techniques  - Monitor for opioid side effects  - Notify MD/LIP if interventions unsuccessful or patient reports new pain  - Anticipate increased pain with activity and pre-medicate as appropriate  Outcome: Progressing

## 2023-10-15 NOTE — HOSPICE RN NOTE
[facility] Inpatient Nursing Rounds:     Pt in bed with family present. Alert and oriented x4. Pt reports pain 5/10 at this time. Tolerating PO intake. Pt expresses wanting to go home on hospice. Morphine gtt 1 mg/hr. Received morphine PRN IVP x2 overnight. Will add morphine ER 15 mg PO q12 per Dr. Phillip Peres to attempt to bridge off IV pain medication. PPS: 50%    Patient remains appropriate for general inpatient hospice care for symptom management of pain requiring frequent nursing assessments and interventions including titration of IV medications. POC discussed with patient, family, Mary Escobar RN, and Dr. Phillip Peres. All in agreement. [facility] will continue to support the patient and family. Cisco Libman.  DANTE ThackerN, RN  [facility] RN Liaison  [phone] (After-hours)

## 2023-10-15 NOTE — HOSPICE RN NOTE
Hospice Rounding Note:    Shaq Gay RN and this RN met with family to discuss plan of care. Pt ultimately wants to go home and have pain relieved. Discussed options. Family chose to have PICC line placed and pt to go home with CADD pump to be ordered once PICC line placed. Discussed with family how hospice can manage IV Morphine drip at home. Agency and online resources given for caregivers to care for pt while family works. This RN messaged Dr. Lilibeth Jasso for consult to Vascular for possible PICC line. This RN also discussed with Fatoumata Vela RN the plan of care. Possible discharge home once pain is managed after PICC line placed. Family and hospital team in agreement of plan of care. Hospice team to follow up and coordinate discharge planning this week. For any changes in condition or TOD, please call [phone] or after hours [phone].     Gabe Vences RN, BSN  [facility]

## 2023-10-15 NOTE — HOSPICE RN NOTE
Inpatient hospice rounds. Patient alert to person, thought she was at [facility], thought it was August of 2023; RR 14, on 2 liters of oxygen via nasal cannula, lung sounds clear; diaz in place; incontinent of stool with LBM 10/13, bowel sounds present, patient ate breakfast; patient rates pain as 6 or 7/10, generalized pain; spoke to patient's daughter, Irma Cheadle, agreeable to infusion being increased to 2 mg/hr, HOLLIE Brasher informed; discussed patient being discharged to [facility], would need PICC line placed to have morphine infusion at home, daughter said she has list of Caregivers Services that was given to her by [facility]. PPS 30%, patient remains inpatient hospice appropriate for the management of pain, anxiety and end of life symptoms requiring frequent nursing assessment, titration of IV medications and too fragile for transfer to a lower level of hospice care.      Lorri Mcadams RN, [address]  [phone]  After Hours: [phone]

## 2023-10-15 NOTE — PLAN OF CARE
Hospice patient. Alert and oriented x4. 2L for comfort. Morphine gtt 2mg/mL, see mar. Vascular consulted for PICC line placement. Kessler in place. Pt resting comfortably. Pt and family updated on poc.      Problem: Patient/Family Goals  Goal: Patient/Family Long Term Goal  Description: Patient's Long Term Goal:   Go home on hospice care    Interventions:  - sw consult for discharge planning  - supportive care  - See additional Care Plan goals for specific interventions  10/15/2023 0955 by Sagrario Pedroza RN  Outcome: Progressing  10/15/2023 0953 by Sagrario Pedroza RN  Outcome: Progressing  Goal: Patient/Family Short Term Goal  Description: Patient's Short Term Goal:   10/13/2023 - \"sleep and pain control\"  10/14/2023 - KEEP PT COMFORTABLE  10/15: comfort care  Interventions:   - Morphine and Ativan as ordered  - MORPHINE DRIP  - POSITION FOR COMFORT    - See additional Care Plan goals for specific interventions  10/15/2023 0955 by Sagrario Pedroza RN  Outcome: Progressing  10/15/2023 0953 by Sagrario Pedroza RN  Outcome: Progressing     Problem: PAIN - ADULT  Goal: Verbalizes/displays adequate comfort level or patient's stated pain goal  Description: INTERVENTIONS:  - Encourage pt to monitor pain and request assistance  - Assess pain using appropriate pain scale  - Administer analgesics based on type and severity of pain and evaluate response  - Implement non-pharmacological measures as appropriate and evaluate response  - Consider cultural and social influences on pain and pain management  - Manage/alleviate anxiety  - Utilize distraction and/or relaxation techniques  - Monitor for opioid side effects  - Notify MD/LIP if interventions unsuccessful or patient reports new pain  - Anticipate increased pain with activity and pre-medicate as appropriate  10/15/2023 0955 by Sagrario Pedroza RN  Outcome: Progressing  10/15/2023 [address] [facility] by Sagrario Pedroza RN  Outcome: Progressing

## 2023-10-16 NOTE — PROGRESS NOTES
Patient reports she slept well - \"my mouth feels dry but other than that, I don't feel any pain right now\". Oral care rendered. Positioned for comfort.

## 2023-10-16 NOTE — PLAN OF CARE
Hospice patient, lethargic but arousable. 2L for comfort. Morphine gtt 2mg/mL, see mar. PICC line placed. Kessler in place. Pt resting comfortably. Pt and family updated on poc.      Problem: Patient/Family Goals  Goal: Patient/Family Long Term Goal  Description: Patient's Long Term Goal:   Go home on hospice care    Interventions:  - sw consult for discharge planning  - supportive care  - See additional Care Plan goals for specific interventions  Outcome: Progressing  Goal: Patient/Family Short Term Goal  Description: Patient's Short Term Goal:   10/13/2023 - \"sleep and pain control\"  10/14/2023 - KEEP PT COMFORTABLE  10/15: comfort care  10/14/2023 - keep patient comfortable  10/15: remain comfortable  Interventions:   - Morphine and Ativan as ordered  - MORPHINE DRIP  - POSITION FOR COMFORT    - See additional Care Plan goals for specific interventions  Outcome: Progressing     Problem: PAIN - ADULT  Goal: Verbalizes/displays adequate comfort level or patient's stated pain goal  Description: INTERVENTIONS:  - Encourage pt to monitor pain and request assistance  - Assess pain using appropriate pain scale  - Administer analgesics based on type and severity of pain and evaluate response  - Implement non-pharmacological measures as appropriate and evaluate response  - Consider cultural and social influences on pain and pain management  - Manage/alleviate anxiety  - Utilize distraction and/or relaxation techniques  - Monitor for opioid side effects  - Notify MD/LIP if interventions unsuccessful or patient reports new pain  - Anticipate increased pain with activity and pre-medicate as appropriate  Outcome: Progressing

## 2023-10-16 NOTE — HOSPICE RN NOTE
[facility] inpatient nursing visit. Daughter Anh at bedside. Questions and concerns addressed. Patient appears sleeping. Pain has been up and down. Severe at times. Morphine gtt currently infusing at 2 mg/hr for management of her severe pain. PO MS Contin is discontinued as we continue to plan transition home. Morphine gtt will continue to be titrated up for comfort. Family needs some time to ready the home patient will be going to. DME will be ordered for delivery some time tomorrow. Patient will go home on morphine gtt per CADD pump most likely on Wednesday. Last VS /74, HR 75, RR 16, temp 97.3 axillary. Patient with no objective signs of distress noted at this time. Patient remains appropriate for inpatient level of hospice care. Needs continued frequent nursing assessments for administration and titration of IV comfort medications. Too fragile for transport or management in lesser hospice setting. [facility] will continue to follow this patient closely for end of life symptom management and to support family. Please call [facility] with any questions or concerns.     Jameson Cheadle RN, [address] Liaison  [phone] after hours

## 2023-10-16 NOTE — CM/SW NOTE
Patient is in the process of being transitioned from [facility] hospice to a routine level of care. Hospice will have DME and medications delivered to the home after 2pm tomorrow and the patient's tentative dc date will be Wednesday morning if all goes according to plan.     Trevon Lee, PATO  [facility]  [phone]

## 2023-10-16 NOTE — PROGRESS NOTES
Patient resting in bed during handoff. Reports adequate pain relief on Morphine drip at 2mg/hour rate. Saturating above 90% on O2 at 2L/NC - dropped to 84% when the nasal cannula fell out of her nose. She denies nausea, no vomiting, no stool. Kessler catheter in place draining clear yellow urine. Kessler care rendered. Repositioned for comfort. IV Decadron  and MS Contin as ordered. No voiced complaints. Afebrile.

## 2023-10-16 NOTE — HOSPICE RN NOTE
[facility] inpatient nursing rounds. Several family members at bedside including daughters. Questions and concerns addressed. Patient sleeping 24/7. Morphine gtt infusing at 2 mg/hr. Requiring intermittent doses if IVP Lorazepam as needed for anxiety/restlessness. Needs continued frequent nursing assessments for administration and titration of IV comfort medications. Too fragile for transport or management in lesser hospice setting. Will re-assess tomorrow for ability to transfer home on Wednesday. [facility] will continue to follow this patient closely for end of life symptom management and to support family. Please call [facility] with any questions or concerns.     Rae Mccall RN, [address] [facility] Liaison  [phone] after hours

## 2023-10-16 NOTE — PLAN OF CARE
Problem: Patient/Family Goals  Goal: Patient/Family Long Term Goal  Description: Patient's Long Term Goal:   Go home on hospice care    Interventions:  - sw consult for discharge planning  - supportive care  - See additional Care Plan goals for specific interventions  Outcome: Progressing  Goal: Patient/Family Short Term Goal  Description: Patient's Short Term Goal:   10/13/2023 - \"sleep and pain control\"  10/14/2023 - KEEP PT COMFORTABLE  10/15: comfort care  10/14/2023 - keep patient comfortable  Interventions:   - Morphine and Ativan as ordered  - MORPHINE DRIP  - POSITION FOR COMFORT    - See additional Care Plan goals for specific interventions  Outcome: Progressing     Problem: PAIN - ADULT  Goal: Verbalizes/displays adequate comfort level or patient's stated pain goal  Description: INTERVENTIONS:  - Encourage pt to monitor pain and request assistance  - Assess pain using appropriate pain scale  - Administer analgesics based on type and severity of pain and evaluate response  - Implement non-pharmacological measures as appropriate and evaluate response  - Consider cultural and social influences on pain and pain management  - Manage/alleviate anxiety  - Utilize distraction and/or relaxation techniques  - Monitor for opioid side effects  - Notify MD/LIP if interventions unsuccessful or patient reports new pain  - Anticipate increased pain with activity and pre-medicate as appropriate  Outcome: Progressing

## 2023-10-16 NOTE — CM/SW NOTE
SW spoke with RN and checked on patient who is sleeping soundly. Will call family to offer support. Patient received PICC line and will likely transition to home on a pump likely tomorrow. SW will initiate dc planning.     Jessie Desouza, [address] [address]

## 2023-10-17 NOTE — PLAN OF CARE
Patient received awake, alert, oriented. Lethargy intermittent, periods of anxiety as well - PRNs administered, see MAR. Morphine gtt at 3mg/hr. Kessler in place for comfort measures. Family at bedside. Patient rounded on routinely.      Problem: Patient/Family Goals  Goal: Patient/Family Long Term Goal  Description: Patient's Long Term Goal:   Go home on hospice care    Interventions:  - sw consult for discharge planning  - supportive care  - See additional Care Plan goals for specific interventions  Outcome: Progressing  Goal: Patient/Family Short Term Goal  Description: Patient's Short Term Goal:   10/13/2023 - \"sleep and pain control\"  10/14/2023 - KEEP PT COMFORTABLE  10/15: comfort care  10/14/2023 - keep patient comfortable  10/15: remain comfortable  Interventions:   - Morphine and Ativan as ordered  - MORPHINE DRIP  - POSITION FOR COMFORT    - See additional Care Plan goals for specific interventions  Outcome: Progressing     Problem: PAIN - ADULT  Goal: Verbalizes/displays adequate comfort level or patient's stated pain goal  Description: INTERVENTIONS:  - Encourage pt to monitor pain and request assistance  - Assess pain using appropriate pain scale  - Administer analgesics based on type and severity of pain and evaluate response  - Implement non-pharmacological measures as appropriate and evaluate response  - Consider cultural and social influences on pain and pain management  - Manage/alleviate anxiety  - Utilize distraction and/or relaxation techniques  - Monitor for opioid side effects  - Notify MD/LIP if interventions unsuccessful or patient reports new pain  - Anticipate increased pain with activity and pre-medicate as appropriate  Outcome: Progressing

## 2023-10-17 NOTE — HOSPICE RN NOTE
[facility] inpatient nursing rounds. Many family at bedside. Questions and concerns addressed. Patient with no objective signs of distress at this time. Morphine gtt required titration up today to 3 mg/hr for management of her severe pain. Also required PRN IVP morphine total of 5 mg, and IVP lorazepam IVP x3 doses today for anxiety/restlessness. Patient remains appropriate for inpatient level of hospice care. Needs continued frequent nursing assessments for administration and titration of IV comfort medications. Unable to tolerate medications by mouth. [facility] will continue to follow this patient closely for end of life symptom management and to support family. Please call [facility] with any questions or concerns. Transition home on hold for now.     Latonia Escobar RN, [address] [facility] [address] Liaison  [phone] after hours

## 2023-10-17 NOTE — HOSPICE RN NOTE
[facility] inpatient nursing rounds. No family at bedside at this time. Patient appears unresponsive. Morphine gtt remains at 3 mg/hr for management of severe pain. Getting additional intermittent doses IVP morphine for pain management and IVP lorazepam for episodes of anxiety/restlessness. No objective signs of distress noted. Patient remains appropriate for inpatient level of hospice care. [facility] will continue to follow this patient closely for end of life symptom management and to support family. Please call [facility] with any questions or concerns. Transition home on hold.      Rodolfo Knowles RN, [address] [facility] Liaison  [phone] after hours

## 2023-10-17 NOTE — PROGRESS NOTES
Pt is lethargic for most of shift, responding to verbal stimuli. Nods appropriately, opens eyes. Ativan given once for restlessness. VSS, afebrile. 2L NC for comfort, saturations >95%. Breathing is laborded. RR at 12. Pleasure feeds, offered pt food, pt declined. Kessler. Bedrest. Morphine gtt at 2 mg/hr. Pt seems comfortable.

## 2023-10-17 NOTE — HOSPICE RN NOTE
[facility] Nursing Rounds:    Patient laying in bed, semi-kapadia's position, eyes closed but opens immediately to verbal stimuli. Answers simple questions appropriately after repeated requests, closes eyes and appears to fall asleep immediately afterwards. Furrowed brow, facial grimace and frown present, appears uncomfortable, but when asked if in pain, patient responds saying, \"stay with me, don't leave me. \"  Morphine drip infusion at 2mg/hr via JULIANO PICC, site covered with occlusive dressing and is dry and intact, CMS to right arm WNL. Resp unlabored, lungs clear, diminished to bilateral.bases, O2 in place per nc at 2L. Kessler catheter in place draining dark, yellow, clear urine per gravity. POC discussed with Camila BROWNE, recommend Morphine IVP followed by increase rate of Morphine drip if objective facial sign of pain persist.  Will continue to monitor. Please call with questions or concerns. Ignacio Tomlin.  Mildred, [address] [facility], [address] [facility]

## 2023-10-18 NOTE — PROGRESS NOTES
Problem: Hospice    Data: Pt is somnolent arousable. A&Ox4. No tele. Kessler in place for comfort. Intervention: Morphine gtt 3mg/hr PRN ativan given.

## 2023-10-18 NOTE — PLAN OF CARE
Problem: Patient/Family Goals  Goal: Patient/Family Long Term Goal  Description: Patient's Long Term Goal:   Go home on hospice care    Interventions:  - sw consult for discharge planning  - supportive care  - See additional Care Plan goals for specific interventions  Outcome: Progressing  Goal: Patient/Family Short Term Goal  Description: Patient's Short Term Goal:   10/13/2023 - \"sleep and pain control\"  10/14/2023 - KEEP PT COMFORTABLE  10/15: comfort care  10/14/2023 - keep patient comfortable  10/15: remain comfortable  Interventions:   - Morphine and Ativan as ordered  - MORPHINE DRIP  - POSITION FOR COMFORT    - See additional Care Plan goals for specific interventions  Outcome: Progressing     Problem: PAIN - ADULT  Goal: Verbalizes/displays adequate comfort level or patient's stated pain goal  Description: INTERVENTIONS:  - Encourage pt to monitor pain and request assistance  - Assess pain using appropriate pain scale  - Administer analgesics based on type and severity of pain and evaluate response  - Implement non-pharmacological measures as appropriate and evaluate response  - Consider cultural and social influences on pain and pain management  - Manage/alleviate anxiety  - Utilize distraction and/or relaxation techniques  - Monitor for opioid side effects  - Notify MD/LIP if interventions unsuccessful or patient reports new pain  - Anticipate increased pain with activity and pre-medicate as appropriate  Outcome: Progressing

## 2023-10-18 NOTE — HOSPICE RN NOTE
1720: Pt asleep. Breathing remains unlabored, RR 12, O2 at 2 L/min NC. No signs of distress at this time. [facility] Inpatient Nursing Rounds:     Pt in bed, lethargic, sleeping soundly. Family and staff RN, Koki Majano, report that the patient is rousable. Patient unable to wake up for me, lorazepam given in past hour. Breathing unlabored, RR 10, diminished in all fields, O2 at 2 L/min NC. Kessler in place. No PO intake reported at this time. Morphine gtt at 3 mg/hr. Over the last 24 hours, lorazepam IVP given 3x for restlessness and morphine IVP given 2x for pain. PPS: 30%    Patient remains appropriate for general inpatient hospice care for symptom management of pain and restlessness requiring frequent nursing assessments and interventions including titration of IV medications. POC discussed with daughter, Bird Silva and Koki Majano RN at bedside. Both in agreement. [facility] will continue to support the patient and family. Shama Haque.  Yifan Ortiz, DANTEN, RN  [facility] RN Liaison  [phone] (After-hours)

## 2023-10-18 NOTE — CM/SW NOTE
SW met with the patient and daughter Kevin No at bedside to provide support. Patient will remain inpatient hospice.     Morro Posada, [facility]  [phone]

## 2023-10-19 NOTE — PROGRESS NOTES
In patient hospice, lethargic. 2L for comfort. Morphine gtt 3mg/mL. Kessler in place. Pt resting comfortably, dtr at bedside. All needs are met at this time.

## 2023-10-19 NOTE — HOSPICE RN NOTE
[facility] nurse visit:  Pt is in bed nonverbal, restless, calling out to daughter Marla Muse, who is at bedside. Pt is poorly responsive. Lungs are clear but diminished. Bilat hands are edematous left worse than right. MS infusing at 3 mg/hr. Kessler draining small amt dark suki urine. POC discussed w/Kate, floor nurse. Kate to give PRN dose of IVP ativan for restlessness. Pt remains GIP LOC appropriate for frequent O/A and treat for pain/restlessness and EOL care.     Duane Joseph RN  [facility]  [phone]

## 2023-10-19 NOTE — PLAN OF CARE
Pt received A/O x 1-2. SpO2 at 98% on RA. On 3mg Morphine gtt. Resting comfortably. Family at bedside. Kessler in place for comfort measures. Pt responds to painful stimuli. Call light within reach. Daughter informed of POC, agrees to plan. No further needs at this time.       Problem: Patient/Family Goals  Goal: Patient/Family Long Term Goal  Description: Patient's Long Term Goal:   Go home on hospice care    Interventions:  - sw consult for discharge planning  - supportive care  - See additional Care Plan goals for specific interventions  Outcome: Progressing  Goal: Patient/Family Short Term Goal  Description: Patient's Short Term Goal:   10/13/2023 - \"sleep and pain control\"  10/14/2023 - KEEP PT COMFORTABLE  10/15: comfort care  10/14/2023 - keep patient comfortable  10/15: remain comfortable  10/18 NOC: comfort   Interventions:   - Morphine and Ativan as ordered  - MORPHINE DRIP  - POSITION FOR COMFORT  - Morphine    - See additional Care Plan goals for specific interventions  Outcome: Progressing     Problem: PAIN - ADULT  Goal: Verbalizes/displays adequate comfort level or patient's stated pain goal  Description: INTERVENTIONS:  - Encourage pt to monitor pain and request assistance  - Assess pain using appropriate pain scale  - Administer analgesics based on type and severity of pain and evaluate response  - Implement non-pharmacological measures as appropriate and evaluate response  - Consider cultural and social influences on pain and pain management  - Manage/alleviate anxiety  - Utilize distraction and/or relaxation techniques  - Monitor for opioid side effects  - Notify MD/LIP if interventions unsuccessful or patient reports new pain  - Anticipate increased pain with activity and pre-medicate as appropriate  Outcome: Progressing

## 2023-10-19 NOTE — DIETARY NOTE
Clinical Nutrition  Nutrition referral was triggered based on LOS. Inpatient Comfort Care Order Set noted. Nutrition services will sign off at this time. Re-consult RD if further nutrition care is needed.   Payal Donovan, [address] [place] [address], [address] [place] [address]   Clinical Dietitian  Spectra: [phone]

## 2023-10-20 NOTE — PLAN OF CARE
Pt inpt hospice. [facility] Orientation. Lethargic. On 2L O2 for comfort. Morphine gtt infusing at 3 mg/hr - See MAR. PRN ativan given for restlessness - See MAR. Pt resting. All needs met at this time. Will follow.

## 2023-10-20 NOTE — CM/SW NOTE
SW met with daughter Marissa Betancourt at bedside to provide support. Patient will remain GIP at this time. RN updated.       David Murillo, [facility]  [phone]

## 2023-10-20 NOTE — HOSPICE RN NOTE
[facility] inpatient nursing rounds. Daughter at bedside. Questions and concerns addressed. Patient minimally responsive. Last VS /62, HR 75, RR 16, temp 98 axillary. Morphine gtt infusing at 3 mg/hr for management of severe pain, breathing, anxiety and end of life symptoms. Remains appropriate for inpatient level of hospice care. Needs continued frequent nursing assessments for administration and titration of IV comfort medications. Too fragile for transport or management in lesser hospice setting. [facility] will continue to follow this patient closely for end of life symptom management and to support family. Please call [facility] with any questions or concerns.     Nabor Mccormack RN, [address] [facility] Liaison  [phone] after hours

## 2023-10-21 NOTE — PROGRESS NOTES
Received pt at 0700, lethergic and [facility] orientation. Morphine gtt 3ml/hr, q4h ativan IV prn. Oral completed. Pt resting with family at bedside, updated on POC and no further questions at this time.      Problem: PAIN - ADULT  Goal: Verbalizes/displays adequate comfort level or patient's stated pain goal  Description: INTERVENTIONS:  - Encourage pt to monitor pain and request assistance  - Assess pain using appropriate pain scale  - Administer analgesics based on type and severity of pain and evaluate response  - Implement non-pharmacological measures as appropriate and evaluate response  - Consider cultural and social influences on pain and pain management  - Manage/alleviate anxiety  - Utilize distraction and/or relaxation techniques  - Monitor for opioid side effects  - Notify MD/LIP if interventions unsuccessful or patient reports new pain  - Anticipate increased pain with activity and pre-medicate as appropriate  Outcome: Progressing

## 2023-10-21 NOTE — PLAN OF CARE
Pt inpt hospice. [facility] Orientation. Somnolent. On RA. Morphine gtt infusing at 3 mg/hr - See MAR. PRN ativan given for restlessness - See MAR. Pt resting. All needs met at this time. Will follow.

## 2023-10-22 NOTE — HOSPICE RN NOTE
[facility] Inpatient Nursing Rounds: pt visited at bedside, no family present. Pt unresponsive, RR=16. Appears comfortable on current interventions of morphine drip at 3mg/hr. Also receiving IVP ativan x's 2 in last 24hrs. Pt on room air, diaz/iv present. No further recommendations at this time. Daughter James Gonzales called and provided update. No further questions at this time. PPS: 10    Pt inpatient hospice at this time for management of symptoms of pain with continuous and IV push medication. Pt requires frequent assessment by skilled nurse for medication administration/titration and for the observation of and interventions for all current and potential symptoms related to EOL. All questions encouraged and answered, will continue to monitor for comfort. [facility] to continue to offer services and provide support to patient and family as needed. POC discussed with RN, Christina Cha.      Vern Cross RN, BSN  [facility] Nurse Liaison  Office: [phone] or After Hours: [phone]

## 2023-10-22 NOTE — PLAN OF CARE
Pt in inpt hospice, is somnolent, [facility] orientation. Afebrile pt resting. Morphine gtt infusing at 3mg/hr. Ativan prn given Q4 for restlessness. Continuous pulse ox. Pleasure feed. diaz. All needs met at this time.   Problem: PAIN - ADULT  Goal: Verbalizes/displays adequate comfort level or patient's stated pain goal  Description: INTERVENTIONS:  - Encourage pt to monitor pain and request assistance  - Assess pain using appropriate pain scale  - Administer analgesics based on type and severity of pain and evaluate response  - Implement non-pharmacological measures as appropriate and evaluate response  - Consider cultural and social influences on pain and pain management  - Manage/alleviate anxiety  - Utilize distraction and/or relaxation techniques  - Monitor for opioid side effects  - Notify MD/LIP if interventions unsuccessful or patient reports new pain  - Anticipate increased pain with activity and pre-medicate as appropriate  Outcome: Progressing

## 2023-10-22 NOTE — PLAN OF CARE
Patient currently under hospice designation under [facility]. Patient on Morphine drip at 3mg/3ml/hr, patient is somnolent, daughter and POA was at the bedside for the duration of the morning , patient on room air, no signs of visible distress, has a diaz in place draining without complication, has a R arm precaution for the picc line, given ativan once, and scheduled decadron during the shift, rounded on routinely,  has a mepelix on sacrum, no BM this shift, bed in low locked position, no further needs at this shift.      Problem: PAIN - ADULT  Goal: Verbalizes/displays adequate comfort level or patient's stated pain goal  Description: INTERVENTIONS:  - Encourage pt to monitor pain and request assistance  - Assess pain using appropriate pain scale  - Administer analgesics based on type and severity of pain and evaluate response  - Implement non-pharmacological measures as appropriate and evaluate response  - Consider cultural and social influences on pain and pain management  - Manage/alleviate anxiety  - Utilize distraction and/or relaxation techniques  - Monitor for opioid side effects  - Notify MD/LIP if interventions unsuccessful or patient reports new pain  - Anticipate increased pain with activity and pre-medicate as appropriate  Outcome: Progressing

## 2023-10-22 NOTE — HOSPICE RN NOTE
[facility] Inpatient Nursing Rounds:     Pt minimally responsive. Mottling present on right lower extremity. Continues on morphine gtt at 3 mg/hr. In the past 24 hours, patient has received 4x lorazepam 2 mg IVP for agitation. PPS: 20%    Patient remains appropriate for general inpatient hospice care for symptom management of pain, dyspnea, and agitation requiring frequent nursing assessments and interventions including titration of IV medications. POC discussed with family and HOLLIE Alexander at bedside. All in agreement. [facility] will continue to support the patient and family. Martin Cohn.  DANTE MelgarN, RN  [facility] RN Liaison  [phone] (After-hours)

## 2023-10-23 NOTE — PLAN OF CARE
Pt is somnolent, [facility] orientation. VSS, afebrile. Stating 85-88% on RA. Breathing is laborded. RR at 10-12 with apenic periods. Kessler. Bedrest. Morphine gtt at 3mg/hr. Pt seems comfortable.

## 2023-10-23 NOTE — HOSPICE RN NOTE
[facility] inpatient nursing visit. Daughter at bedside. Questions and concerns addressed. EOL education provided. Last VS /46, HR 74, RR 10, shallow, labored, periods of apnea noted, temp 97.7 axillary. Patient minimally responsive, opened eyes but did not focus. Patient also with restlessness. Does not like to be touched. Discussed patient status with hospital nurse [facility]. Patient will be given IVP Lorazepam 1 mg, IVP Morphine 1 mg now for comfort along with her dose of IVP Dexamethasone. Morphine gtt is titrated up to 3 mg/hr. Patient remains appropriate for inpatient level of hospice care. Needs continued frequent nursing assessments for administration and titration of IV comfort medications. Too fragile for transport or management in lesser hospice setting. [facility] will continue to follow this patient closely for end of life symptom management and to support family. Please call [facility] with any questions or concerns.       iKko Kwon RN, [address] [facility] [address] Liaison  [phone] after hours

## 2023-10-23 NOTE — CM/SW NOTE
DENYS met with the patient at bedside. She will remain GIP. DENYS will call family to provide support.     Tru Perez LCSW  [facility]  [phone]

## 2023-10-23 NOTE — PLAN OF CARE
Pt somnolent throughout shift, able to be aroused. No supplemental O2, saturations in upper 80's. Breathing is shallow, apneic periods noted, respirations 10-12. No tele, vitals Q shift, pulse ox remains on. Kessler in place. Morphine drip continued. PRN ativan given, family requests it be given every 4 hours to keep pt comfortable. Family at bedside.      Problem: PAIN - ADULT  Goal: Verbalizes/displays adequate comfort level or patient's stated pain goal  Description: INTERVENTIONS:  - Encourage pt to monitor pain and request assistance  - Assess pain using appropriate pain scale  - Administer analgesics based on type and severity of pain and evaluate response  - Implement non-pharmacological measures as appropriate and evaluate response  - Consider cultural and social influences on pain and pain management  - Manage/alleviate anxiety  - Utilize distraction and/or relaxation techniques  - Monitor for opioid side effects  - Notify MD/LIP if interventions unsuccessful or patient reports new pain  - Anticipate increased pain with activity and pre-medicate as appropriate  Outcome: Progressing

## 2023-10-24 NOTE — HOSPICE RN NOTE
Pt not responding except mild eyelid movement when spoken to. [place] at bedside notes periodic restlessness which has been helped by Ativan and MS prn doses. RR 10, 10-15 second apnea noted, clear, not labored. She does not show s/s of pain which is being addressed by MS drip, changed from 3mg to 4mg yesterday afternoon. She has had a bolus dose of MS 1mg in last 24 hours and IVP ativan, 1 1mg dose and 3 2mg IVP doses. She continues to receive IVP decadron scheduled q6h. She remains GIP appropriate for administration of IV meds to address severe pain r/t cancer with the oversight of frequent nursing monitoring to address needs. POC reviewed with jorge and with RN Dena Day; both agree with plan and note that her periodic restlessness is palliated by the meds provided.

## 2023-10-24 NOTE — PROGRESS NOTES
Pt is somnolent, KILO orientation. No tele, Q shift vitals. VSS, afebrile. Stating 85-91% on RA. Breathing is laborded/shallow. RR at 10-12 with frequent apenic periods. Kessler. Bedrest. Morphine gtt at 3mg/hr. Ativan given PRN. Pt seems comfortable.

## 2023-10-24 NOTE — HOSPICE RN NOTE
[facility] inpatient nursing rounds. No family at bedside at this time. Discussed patient status with hospital nurse. Morphine gtt has been titrated up to 4 mg/hr for management of severe pain and end of life symptoms. No objective signs of distress noted at this time. Remains appropriate for inpatient level of hospice care. Needs continued frequent nursing assessments for administration and titration of IV comfort medications. Too fragile for transport or management in lesser hospice setting. [facility] will continue to follow this patient closely for end of life symptom management and to support family. Please call [facility] with any questions or concerns.     Derryl Schaumann RN, [address] Liaison  [phone] after hours

## 2023-10-24 NOTE — PLAN OF CARE
Patient is somnolent with some times of being awake during shift, on room air, getting vitals Q shift NSR, getting decadron , has a diaz in place, IV morphine continuous @ 4mL/hr, getting ativan once this shift, is allowed PRN Q4, patient family at bedside during shift, hospice nursing team rounded on patient with no changes to plan. Routinely rounded on patient, no visible pain or distress noticed, patient comfortable in bed with no further needs this shift.    Problem: PAIN - ADULT  Goal: Verbalizes/displays adequate comfort level or patient's stated pain goal  Description: INTERVENTIONS:  - Encourage pt to monitor pain and request assistance  - Assess pain using appropriate pain scale  - Administer analgesics based on type and severity of pain and evaluate response  - Implement non-pharmacological measures as appropriate and evaluate response  - Consider cultural and social influences on pain and pain management  - Manage/alleviate anxiety  - Utilize distraction and/or relaxation techniques  - Monitor for opioid side effects  - Notify MD/LIP if interventions unsuccessful or patient reports new pain  - Anticipate increased pain with activity and pre-medicate as appropriate  Outcome: Progressing   Problem: Delirium  Goal: Minimize duration of delirium  Description: Interventions:  - Encourage use of hearing aids, eye glasses  - Promote highest level of mobility daily  - Provide frequent reorientation  - Promote wakefulness i.e. lights on, blinds open  - Promote sleep, encourage patient's normal rest cycle i.e. lights off, TV off, minimize noise and interruptions  - Encourage family to assist in orientation and promotion of home routines  Outcome: Progressing

## 2023-10-25 NOTE — PLAN OF CARE
Pt is somnolent, appears comfortable. Apneic breathing. No tele, Q shift vitals. On RA, SpO2 88%. Generalized edema. Morphine gtt bag changed, 4mg/hr. Kessler catheter in place, minimal very dark urine output. Will continue to monitor.

## 2023-10-25 NOTE — HOSPICE RN NOTE
Pt received unresponsive in bed with no family bedside. Breathing remains irregular with apneic pauses. Ativan 2 mg ivp given last at 0547. Morphine continues to infuse at 4 mg/hr. VSS trending down. Pt continues to require GIP level of care requiring frequent nursing assessment, medication administration/titration for symptom mgmt that cannot be met at a lower level of care. POC reviewed with Dheeraj Chakraborty RN.   Marina Snowden RN  [facility] TNL/SOC RN  [phone] hand/Right:

## 2023-10-25 NOTE — PROGRESS NOTES
10/25/23 1131   Clinical Encounter Type   Visited With Patient   Routine Visit Introduction   Referral From Other (Comment)   Referral To    Taxonomy   Intended Effects Demonstrate caring and concern   Methods Offer support   Interventions Silent prayer     Resident Chaplain Robin Levine

## 2023-10-26 NOTE — PROGRESS NOTES
Received report around 07:30 AM this morning from night shift and patient was . Notified MD, hospice, and family. Alphonse Kerns called this nurse and inquired about the patient. Morphine drip stopped. Kessler catheter and PICC line removed.

## 2023-10-26 NOTE — PROGRESS NOTES
10/26/23 0950   Clinical Encounter Type   Visited With Family   Routine Visit Follow-up   Continue Visiting No   Crisis Visit Death   Referral From Nurse   Family Spiritual Encounters   Family Coping Sadness   Family Participation in Care Consistently   Family Support During Treatment Consistently   Taxonomy   Intended Effects Demonstrate caring and concern   Methods Exploring hope; Offer spiritual/Nondenominational support;Assist with spiritual/Nondenominational practices; Collaborate with care team member   Interventions Acknowledge current situation; Active listening; Ask guided questions; Ask guided questions about myriam; Identify supportive relationship(s); Provide grief resources;[place]

## 2023-10-26 NOTE — HOSPICE RN NOTE
Support provided to two daughters, granddaughter and a son-in-law at bedside. They are very supportive of one another. Education provided on availability of bereavement services.   They indicated that they have been in touch with body donation [address].

## 2023-10-26 NOTE — PROGRESS NOTES
Patient unresponsive to stimulation. Morphine gtt infusing for comfort. Respirations regular/shallow. Kessler catheter in place. Pt febrile, prn Tylenol suppository given as ordered. Will continue to monitor patient. Please refer to [facility] for details.

## 2023-10-28 NOTE — DISCHARGE SUMMARY
Odalis Brice HOSPITALIST  DISCHARGE SUMMARY     Flower Leary Patient Status:  Inpatient    3/1/1945 MRN [id number]   [facility] 5NW-A Attending No att. providers found   Hosp Day # 15 PCP Jeff Parker MD     Date of Admission: 10/13/2023  Date of Discharge:  10/26/2023     Discharge Disposition:  Hospice    Discharge Diagnosis:  Hospice     History of Present Illness:   Flower Leary is a 69year old female with a past medical history of breast cancer with metastasis to bone, COPD, PVD, CAD who was admitted to inpatient hospice for continued comfort measures. Patient accepting of hospice. Has no acute complaints today. Her pain is well controlled. Brief Synopsis:   Pt admitted to IP hospice  on 10/26/23    Lace+ Score: 64  59-90 High Risk  29-58 Medium Risk  0-28   Low Risk       TCM Follow-Up Recommendation:  LACE < 29: Low Risk of readmission after discharge from the hospital. No TCM follow-up needed. Procedures during hospitalization:   no    Incidental or significant findings and recommendations (brief descriptions):  no    Lab/Test results pending at Discharge:   no    Consultants:  no    Discharge Medication List:     Discharge Medications        STOP taking these medications      Incontinence Supply Disposable Misc               ASK your doctor about these medications        Instructions Prescription details   albuterol 108 (90 Base) MCG/ACT Aers  Commonly known as: Ventolin HFA      Inhale into the lungs every 6 (six) hours as needed for Wheezing or Shortness of Breath. Refills: 0     calcium carbonate 500 MG Chew  Commonly known as: Tums      Chew 1 tablet (500 mg total) by mouth 2 (two) times daily. Refills: 0     CENTRUM SILVER ADULT 50+ OR      Take 1 tablet by mouth daily. Refills: 0     Cholecalciferol 125 MCG (5000 UT) Tabs  Commonly known as: VITAMIN D-3      Take 1 tablet (5,000 Units total) by mouth daily.    Refills: 0     cyanocobalamin 1000 MCG Tabs  Commonly known as: Vitamin B12      Take 1 tablet (1,000 mcg total) by mouth daily. Refills: 0     famotidine 20 MG Tabs  Commonly known as: Pepcid      Take 1 tablet (20 mg total) by mouth daily. Refills: 0     gabapentin 300 MG Caps  Commonly known as: Neurontin      Take 1 capsule (300 mg total) by mouth nightly. Refills: 0     lidocaine 5 % Oint  Commonly known as: Xylocaine      Apply topically 2 (two) times daily. Apply to shoulder   Refills: 0     melatonin 3 MG Tabs      Take 1 tablet (3 mg total) by mouth nightly as needed (insomnia). Refills: 0     nicotine 21 MG/24HR Pt24  Commonly known as: Nicoderm CQ      Place 1 patch onto the skin daily. Refills: 0     polyethylene glycol (PEG 3350) 17 g Pack  Commonly known as: Miralax      Take 17 g by mouth daily as needed (constipation). Refills: 0              [place]    Follow-up appointment:   No follow-up provider specified. Appointments for Next 30 Days 10/28/2023 - 2023      None            Vital signs:       Physical Exam:        -----------------------------------------------------------------------------------------------  PATIENT DISCHARGE INSTRUCTIONS: See electronic chart    Aiden Fairbanks MD    Total time spent on discharge plannin minutes     The [facility] makes medical notes like these available to patients in the interest of transparency. Please be advised this is a medical document. Medical documents are intended to carry relevant information, facts as evident, and the clinical opinion of the practitioner. The medical note is intended as peer to peer communication and may appear blunt or direct. It is written in medical language and may contain abbreviations or verbiage that are unfamiliar.

## 2025-04-07 NOTE — CM/SW NOTE
07/17/18 0800   Discharge disposition   Expected discharge disposition Home-Health   Name of Facillity/Home Care/Hospice [facility]   Discharge transportation [facility] 7/16/18 Yes

## (undated) DIAGNOSIS — I25.10 CORONARY ARTERY DISEASE INVOLVING NATIVE HEART WITHOUT ANGINA PECTORIS, UNSPECIFIED VESSEL OR LESION TYPE: ICD-10-CM

## (undated) DIAGNOSIS — G54.6 PHANTOM LIMB PAIN (HCC): ICD-10-CM

## (undated) DIAGNOSIS — R32 URINARY INCONTINENCE, UNSPECIFIED TYPE: ICD-10-CM

## (undated) DEVICE — CLEAR MONOFILAMENT (POLYDIOXANONE), ABSORBABLE SURGICAL SUTURE: Brand: PDS

## (undated) DEVICE — SUTURE VICRYL 1 OS-6

## (undated) DEVICE — PREMIUM WET SKIN PREP TRAY: Brand: MEDLINE INDUSTRIES, INC.

## (undated) DEVICE — VIOLET BRAIDED (POLYGLACTIN 910), SYNTHETIC ABSORBABLE SUTURE: Brand: COATED VICRYL

## (undated) DEVICE — ABDOMINAL PAD: Brand: DERMACEA

## (undated) DEVICE — SUTURE VICRYL 3-0 SH

## (undated) DEVICE — STOCKINETTE HYDROMED 8X6

## (undated) DEVICE — BREAST-HERNIA-PORT CDS-LF: Brand: MEDLINE INDUSTRIES, INC.

## (undated) DEVICE — KENDALL SCD EXPRESS SLEEVES, KNEE LENGTH, MEDIUM: Brand: KENDALL SCD

## (undated) DEVICE — TRANSPOSAL ULTRAFLEX DUO/QUAD ULTRA CART MANIFOLD

## (undated) DEVICE — BLADE ELECTRODE: Brand: EDGE

## (undated) DEVICE — Device

## (undated) DEVICE — STERILE (15.2 X 244CM) POLYETHYLENE TELESCOPICALLY-FOLDED COVER WITH ATTACHED (3CM) NEOGUARD™ TIP: Brand: SURGI-TIP TRANSDUCER COVER

## (undated) DEVICE — BONE WAX W31G

## (undated) DEVICE — PROXIMATE SKIN STAPLERS (35 WIDE) CONTAINS 35 STAINLESS STEEL STAPLES (FIXED HEAD): Brand: PROXIMATE

## (undated) DEVICE — FAN SPRAY KIT: Brand: PULSAVAC®

## (undated) DEVICE — SYRINGE 5ML LL TIP

## (undated) DEVICE — UNDYED BRAIDED (POLYGLACTIN 910), SYNTHETIC ABSORBABLE SUTURE: Brand: COATED VICRYL

## (undated) DEVICE — Device: Brand: STABLECUT®

## (undated) DEVICE — SOL  .9 3000ML

## (undated) DEVICE — SUTURE SILK 3-0 SH

## (undated) DEVICE — GLOVE BIOGEL M SURG SZ 71/2

## (undated) DEVICE — SUPER SPONGES,MEDIUM: Brand: KERLIX

## (undated) DEVICE — PROXIMATE PLUS MD MULTI-DIRECTIONAL RELEASE SKIN STAPLERS CONTAINS 35 STAINLESS STEEL STAPLES APPROXIMATE CLOSED DIMENSIONS: 6.9MM X 3.9MM WIDE: Brand: PROXIMATE

## (undated) DEVICE — TOWEL: OR BLU 80/CS: Brand: MEDICAL ACTION INDUSTRIES

## (undated) DEVICE — GOWN,SIRUS,FABRIC-REINFORCED,LARGE: Brand: MEDLINE

## (undated) DEVICE — GOWN,SIRUS,FABRIC-REINFORCED,X-LARGE: Brand: MEDLINE

## (undated) DEVICE — CASED DISP BIPOLAR CORD

## (undated) DEVICE — REM POLYHESIVE ADULT PATIENT RETURN ELECTRODE: Brand: VALLEYLAB

## (undated) DEVICE — LOWER EXTREMITY CDS-LF: Brand: MEDLINE INDUSTRIES, INC.

## (undated) DEVICE — #15 STERILE STAINLESS BLADE: Brand: STERILE STAINLESS BLADES

## (undated) DEVICE — BLADE SAW KM33-11

## (undated) DEVICE — UNDERPAD INCNT 30X30IN PP HVY

## (undated) DEVICE — PADDING CAST SOFT ROLL 4\"

## (undated) DEVICE — GLOVE SURG SENSICARE SZ 7-1/2

## (undated) DEVICE — CAUTERY BLADE 2IN INS E1455

## (undated) DEVICE — 3M™ IOBAN™ 2 ANTIMICROBIAL INCISE DRAPE 6650EZ: Brand: IOBAN™ 2

## (undated) DEVICE — CONT SPEC SURG FAXITRON WEDGE

## (undated) DEVICE — SOL  .9 1000ML BTL

## (undated) DEVICE — ARISTA 1 GRAM

## (undated) DEVICE — ZIMMER® STERILE DISPOSABLE TOURNIQUET CUFF WITH PLC, DUAL PORT, SINGLE BLADDER, 34 IN. (86 CM)

## (undated) DEVICE — SUTURE ETHILON 3-0 PS-1

## (undated) DEVICE — BANDAGE ROLL,100% COTTON, 6 PLY, LARGE: Brand: KERLIX

## (undated) DEVICE — BANDAGE COBAN STERILE 1IN

## (undated) DEVICE — STANDARD HYPODERMIC NEEDLE,POLYPROPYLENE HUB: Brand: MONOJECT

## (undated) DEVICE — PREVENA PEEL & PLACE SYSTEM KIT- 13 CM: Brand: PREVENA™ PEEL & PLACE™

## (undated) DEVICE — 1200CC GUARDIAN II: Brand: GUARDIAN

## (undated) DEVICE — MARKER SKIN 2 TIP

## (undated) DEVICE — GLOVE SURG TRIUMPH SZ 6-1/2

## (undated) DEVICE — SUTURE SILK 2-0 FS

## (undated) DEVICE — SUTURE VICRYL 0 CT-1

## (undated) DEVICE — GAUZE SPONGES,12 PLY: Brand: CURITY

## (undated) DEVICE — BRA ZIPPERED STYLE 958 LARGE

## (undated) DEVICE — DRAPE,TAPE STRIPS,STERILE: Brand: MEDLINE

## (undated) DEVICE — LAPAROTOMY SPONGE - RF AND X-RAY DETECTABLE PRE-WASHED: Brand: SITUATE

## (undated) DEVICE — STOCKING CMPR LG LNG THG LGTH

## (undated) DEVICE — ADHESIVE MASTISOL 2/3CC VL

## (undated) DEVICE — 3M™ STERI-DRAPE™ U-DRAPE 1015: Brand: STERI-DRAPE™

## (undated) DEVICE — HEMOCLIP HORIZON SM 001200

## (undated) DEVICE — NON-ADHERENT STRIPS,OIL EMULSION: Brand: CURITY

## (undated) DEVICE — HEMOCLIP HORIZON MED 002200

## (undated) DEVICE — DRAPE PACK CHEST & U BAR

## (undated) NOTE — LETTER
[facility]  Jameson Rivera 62 [address] [facility], [address] [place] [address]    Consent for Operation    Date: __________________    Time: _______________    1.  I authorize the performance upon [employer] the following operation:    Procedure(s):  LEFT ABOVE KNEE A procedure has been videotaped, the surgeon will obtain the original videotape. The hospital will not be responsible for storage or maintenance of this tape.     6. For the purpose of advancing medical education, I consent to the admittance of observers to t STATEMENTS REQUIRING INSERTION OR COMPLETION WERE FILLED IN.     Signature of Patient:   ___________________________    When the patient is a minor or mentally incompetent to give consent:  Signature of person authorized to consent for patient: ____________ supplements, and pills I can buy without a prescription (including street drugs/illegal medications). Failure to inform my anesthesiologist about these medicines may increase my risk of anesthetic complications.   · If I am allergic to anything or have had Anesthesiologist Signature     Date   Time  I have discussed the procedure and information above with the patient (or patient’s representative) and answered their questions. The patient or their representative has agreed to have anesthesia services.     ___

## (undated) NOTE — LETTER
05/07/21        Syl Linares  [address] [facility]      Dear Dede Leeole records indicate that you have outstanding lab work and or testing that was ordered for you and has not yet been completed:  Orders Placed This Enc

## (undated) NOTE — IP AVS SNAPSHOT
Patient Demographics     Address  [address] [address] Phone  [phone] [facility]  [phone] [facility] E-mail Address  [email or web address]      Emergency Contact(s)     Name Relation Home Work [address] [facility] · Do not drive, if you are  taking pain medication. Follow-up Appointment with Dr. Azul Clark  · Call Dr. Truong Dela Cruz office today for an appointment in two week. · Verify your appointment date, day, time, and location.   · At your visit: your wound will be DULoxetine HCl 30 MG Cpep  Commonly known as:  CYMBALTA      Take 2 capsules (60 mg total) by mouth daily. Paul Carty MD         ferrous sulfate 325 (65 FE) MG Tbec      Take 325 mg by mouth 2 (two) times daily with meals.           furosemide 20 MG Tab Order ID Medication Name Action Time Action Reason Comments    [id number] DULoxetine HCl (CYMBALTA) DR particles cap 60 mg 07/16/18 0852 Given      [id number] HYDROcodone-acetaminophen (NORCO)  MG per tab 1 tablet (Or Linked Group #1) 07/15/18 2048 Gi BASIC METABOLIC PANEL (8) [[id number]] (Abnormal)  Resulted: 07/16/18 0536, Result status: Final result   Ordering provider:  Slime De La Rosa MD  07/15/18 9556 Resulting lab:  [facility]    Specimen Information    Type Source Collected On   Blood — 07/16/18 Blood Culture FREQ X 2 [[id number]] Collected:  07/10/18 1248    Order Status:  Completed Lab Status:  Final result Updated:  07/15/18 1300    Specimen:  Blood from Blood,peripheral      Blood Culture Result No Growth 5 Days    Blood Culture FREQ X 2 [[id number] Vancomycin 2  Sensitive                        H&P - H&P Note      H&P signed by Slime De La Rosa MD at 7/10/2018  4:53 PM  Version 1 of 1    Author:  Slime De La Rosa MD Service:  Hospitalist Author Type:  Physician    Filed:  7/10/2018  4:53 PM Date of Serv 05/17/2018: OTHER Left      Comment: Left stump debridement per Dr. Nguyen Peri History:  reports that she quit smoking about 3 months ago. She has a 56.00 pack-year smoking history.  She has never used smokeless tobacco. She reports that she does not simethicone 80 MG Oral Chew Tab Chew 80 mg by mouth every 6 (six) hours as needed. Upset Stomach  Disp:  Rfl:    Metoclopramide HCl 5 MG Oral Tab Take 5 mg by mouth every 6 (six) hours as needed.  Nausea  Disp:  Rfl:    Multiple Vitamin (MULTI-VITAMIN DAILY No results for input(s): PTP, INR in the last 168 hours. No results for input(s): TROP, CK in the last 168 hours. Imaging: Imaging data reviewed in Epic. ASSESSMENT / PLAN:     1. Left wound infection with possible cellulitis/osteomyelitis  1.  I Reason for Consultation:  LLE stump infection    History of Present Illness:  Anna Marino is a a(n) 68year old female with h/o PVD with L foot gangrene s/p a L BKA on 6/40, complicated with a non healing wound s/p debridement in early May.  cx grew •  acetaminophen (TYLENOL) tab 650 mg, 650 mg, Oral, Q4H PRN  •  [START ON 7/11/2018] aspirin EC tab 81 mg, 81 mg, Oral, Daily  •  [START ON 7/11/2018] DULoxetine HCl (CYMBALTA) DR particles cap 60 mg, 60 mg, Oral, Daily  •  ferrous sulfate EC tab 325 mg, tunnel seems to be only about 1 cm deep but exam limited since pt with significant pain. Wound on lat stump seems clean, with healthy granulation tissue.      Laboratory Data:    Recent Labs   Lab  07/10/18   1210   RBC  3.69*   HGB  10.6*   HCT  34.3   MCV Case and plan d/w pt and RN  Thank you for allowing me to participate in the care of this patient. Please do not hesitate to call if you have any questions. I will continue to follow with you and will make further recommendations based on her progress. O2 @ 3LNC continuous   • Deep vein thrombosis (HCC)     Left   • Depression    • Disorder of thyroid     nodules   • History of blood clots    • Lupus    • Muscle weakness    • Neuropathy    • Osteoarthritis    • Peripheral vascular disease (HCC)    • Sle PT Approx Degree of Impairment Score: 54.16%   Standardized Score (AM-PAC Scale): 40.78   CMS Modifier (G-Code): CK    FUNCTIONAL ABILITY STATUS  Gait Assessment   Gait Assistance: Not tested  Distance (ft): lateral transfer from EOB to chair        Stoop/ 24 hour care/supervision and HHPT/OT upon [facility] d/c.     DISCHARGE RECOMMENDATIONS  PT Discharge Recommendations: 24 hour care/supervision;Home with home health PT/OT     PLAN  PT Treatment Plan: Bed mobility; Body mechanics; Endurance; Energy conse Bacteremia due to Gram-positive bacteria    Status post above knee amputation of left lower extremity (HCC)    Irregular heart rate    Acute blood loss anemia[AR.2]      Past Medical History[AR.1]  Past Medical History:   Diagnosis Date   • CAD (coronary blankets)?: None   -   Sitting down on and standing up from a chair with arms (e.g., wheelchair, bedside commode, etc.): A Lot   -   Moving from lying on back to sitting on the side of the bed?: None[AR.2]   How much help from another person does the patie THERAPEUTIC EXERCISES  Lower Extremity Alternating marching  Ankle pumps  Knee extension     Upper Extremity n/a     Position Sitting     Repetitions   10   Sets   1[AR.1]     Patient End of Session: Needs met;Call light within reach;RN aware of liz Physical Therapy Note signed by Pilo Garcia PT at 7/14/2018  3:44 PM  Version 1 of 1    Author:  Pilo Garcia PT Service:  (none) Author Type:  Physical Therapist    Filed:  7/14/2018  3:44 PM Date of Service:  7/14/2018  3:36 PM Status:  Betty Keyes \"Well my pain is still really high\"    Patient’s self-stated goal is decr pn and go home    OBJECTIVE  Precautions: Bed/chair alarm;Drain(s)    WEIGHT BEARING RESTRICTION  Weight Bearing Restriction: L lower extremity           L Lower Extremity: Non-Nader reposition safely and very sensitive to LLE despite extreme caution with mobility. Pt able to perform LE therex in sitting. PT positioned BSC c drop off just adjacent to R hip.  Pt able to partial squat pivot and scoot to from the B to the [facility] c CGA/min A independent      Goal #2 Patient is able to demonstrate transfers EOB to/from Chair/Wheelchair at assistance level: supervision   Goal #3 Assess w/c mobility as appropriate   Goal #4     Goal #5     Goal #6     Goal Comments: Goals established on 7/13/2018 bilateral   • COPD (chronic obstructive pulmonary disease) (HCC)     O2 @ 3LNC continuous   • Deep vein thrombosis (HCC)     Left   • Depression    • Disorder of thyroid     nodules   • History of blood clots    • Lupus    • Muscle weakness    • Neuropath CMS Modifier (G-Code): CJ[AO.3]    FUNCTIONAL TRANSFER ASSESSMENT[AO.1]  Supine to Sit : Supervision  Sit to Stand: Not tested[AO.3]    Skilled Therapy Provided: Patient educated on OT role, goals, plan of care, recommendations and safety. [AO.1]  Readily e Progressin/1[AO.1][AO.2]:  ADL Goals   Patient will perform lower body dressing:  with stand by assist[AO.1]- partially met[AO.2]  Patient will perform toileting: with stand by assist[AO.1]- partially met[AO. 2]     Functional Transfer Goals  Adrien Bacteremia due to Gram-positive bacteria    Status post above knee amputation of left lower extremity (HCC)    Irregular heart rate    Acute blood loss anemia[AO.2]      Past Medical History[AO.1]  Past Medical History:   Diagnosis Date   • CAD (coronary -   Taking care of personal grooming such as brushing teeth?: A Little  -   Eating meals?: None[AO. 2]    AM-PAC Score:[AO.1]  Score: 19  Approx Degree of Impairment: 42.8%  Standardized Score (AM-PAC Scale): 40.22  CMS Modifier (G-Code): CK[AO.2]    FUNCTI ADL Goals   Patient will perform lower body dressing:  with stand by assist  Patient will perform toileting: with stand by assist     Functional Transfer Goals  Patient will transfer from sit to supine:  with stand by assist- MET  Patient will transfer fro

## (undated) NOTE — IP AVS SNAPSHOT
Patient Demographics     Address  [address] [facility] Phone  [phone] [facility])  [phone] [facility]      Emergency Contact(s)     Name Relation Home Work Mobile    Mariluz Daughter [phone]    [facility] Commonly known as:  DESYREL      Take 1 tablet (50 mg total) by mouth nightly as needed for Sleep.    Omayra Ramos, DO               Where to Get Your Medications      [facility] [address] [id number] magnesium hydroxide (MILK OF MAGNESIA) 400 MG/5ML suspension 30 mL 04/14/18 0916 Given            LEFT LOWER ABDOMEN     Order ID Medication Name Action Time Action Reason Comments    [id number] Heparin Sodium (Porcine) 5000 UNIT/ML injection 5,0 After much discussion with her family she did not feel the second opinion was necessary and requests below-knee amputation. She continues to smoke over a pack a day.     History:  Past Medical History:   Diagnosis Date   • Cataract     bilateral   • COPD ( RESPIRATORY: no rales, rhonchi, or wheezes B  CARDIO: RRR without murmur, no murmur, no gallop   ABDOMEN: soft, non-tender with no palpable aneurysm or masses  BACK: normal, no tenderness  SKIN: no rashes, no suspicious lesions, warm and dry  EXTREMITIES: Reason for Consultation: Anxiety    Impression:  AXIS 1:[RH.1] Anxiety disorder unspecified. Rule out generalized anxiety disorder. 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 [RH.4]    Past Medical History:   Diagnosis Date   • CAD (coronary artery disease)    • Cataract     bilateral   • COPD (chronic obstructive pulmonary disease) ([facility] [address]    • Deep vein thrombosis (HCC)     Left •  Nalbuphine HCl (NUBAIN) injection 2.5 mg, 2.5 mg, Intravenous, Q4H PRN  •  0.9%  NaCl infusion, , Intravenous, Continuous  •  Heparin Sodium (Porcine) 5000 UNIT/ML injection 5,000 Units, 5,000 Units, Subcutaneous, Q8H [address]  •  ipratropium-albuterol (Michael Yanez Physical Therapy Notes (last 72 hours) (Notes from 4/11/2018  5:29 PM through 4/14/2018  5:29 PM)      Physical Therapy Note signed by Latasha Dhillon PT at 4/13/2018 12:13 PM  Version 1 of 1    Author:  Latasha Dhillon PT Service:  Rehab Author Type: Location: Left LE pain @ stump site, screams with pain when assiisting to move left LE  Management Techniques: Activity promotion; Body mechanics;Breathing techniques;Relaxation;Repositioning (PCA, given bolus prior to this session)    BALANCE this session. Unable to assess further mobility due to adamantly refusing. RN - Vinay Silvestre was notified.  Patient was left resting in bed with Dr. Abrahan Duarte present for consult  THERAPEUTIC EXERCISES  Lower Extremity Conditioning & strengthening exercises for Right :  Drew Blake PT (Physical Therapist)        PHYSICAL THERAPY TREATMENT NOTE - INPATIENT    Room Number: 872/026-P     Session: 2  Number of Visits to Meet Established Goals: 5    Presenting Problem: S/P Left BKA on 04/09/18    Problem List AM-PAC '6-Clicks' INPATIENT SHORT FORM - BASIC MOBILITY  How much difficulty does the patient currently have. ..  -   Turning over in bed (including adjusting bedclothes, sheets and blankets)?: A Little   -   Sitting down on and standing up from a chair wit place; Discussed recommendations with /    ASSESSMENT   Patient is a 68year old female admitted on 4/9/2018 for L BKA. Pertinent comorbidities and personal factors impacting therapy include pain, anxiety, COPD. Continues to remain

## (undated) NOTE — LETTER
Date: 3/1/2023    Patient Name: Hazel Moreno          To Whom it may concern: This letter has been written at the patient's request. The above patient was seen at the [facility] for treatment of a medical condition.     Patient will need an extra key to her apartment to give it to her daughter who stops by periodically for wellbeing check        Sincerely,    Macario Hanks MD

## (undated) NOTE — LETTER
10/28/2021       Justyn Naqvi   [address] [facility]      Dear Navdeep Jenkins,    IF YOU ARE 48YEARS OF AGE OR OLDER, COLORECTAL CANCER SCREENING CAN SAVE YOUR LIFE.     As your primary care doctor, I want to do everything I can to p

## (undated) NOTE — LETTER
03/05/20        Domitila Shorten  [address] [facility] [address]      Dear Shubham Cristobal records indicate that you have outstanding lab work and or testing that was ordered for you and has not yet been completed:  Orders Placed This

## (undated) NOTE — LETTER
April 16, 2021    Jerry Layne  [address] [place] [id number]    Dear Asad Yan: It was a pleasure speaking with you over the phone recently.  To follow up, I wanted to send you some contact information to utilize when you have a questio

## (undated) NOTE — IP AVS SNAPSHOT
[address]            (For Outpatient Use Only) Initial Admit Date: 5/4/2018   Inpt/Obs Admit Date: Inpt: 5/4/18 / Obs: N/A   Discharge Date:    Patricia Garcia:  [de-identified]   MRN: [de-identified]   CSN: [id number]        ENCOUNTER  Patient C Hospital Account Financial Class: Medicare    May 8, 2018

## (undated) NOTE — IP AVS SNAPSHOT
Patient Demographics     Address  [address] [place] [address] Phone  [phone] [facility]  [phone] [facility]      Emergency Contact(s)     Name Relation Home Work Mobile    Mariluz Daughter [phone]    [email or web address] Yuliana Chowdhury MD. Schedule an appointment as soon as possible for a visit in 1 week. Specialty:  SURGERY, VASCULAR  Contact information:  [facility] [address]             Wilma Blanco MD In 1 week. Apply topically as needed (for pain management). Metoclopramide HCl 5 MG Tabs  Commonly known as:  REGLAN      Take 5 mg by mouth every 6 (six) hours as needed.  Nausea          MILK OF MAGNESIA 400 MG/5ML Susp  Generic drug:  magnesium hydroxide Bring a paper prescription for each of these medications  HYDROcodone-acetaminophen  MG Tabs  sodium chloride 0.9 % SOLN 250 mL with Vancomycin HCl 1000 MG SOLR 1,000 mg           370-370-A - MAR ACTION REPORT  (last 24 hrs)    ** SITE UNKNOWN ** Resp  17 Filed at 05/23/2018 1152   Temp  98.2 °F (36.8 °C) Filed at 05/23/2018 1152   SpO2  96 % Filed at 05/23/2018 1152      Patient's Most Recent Weight    Flowsheet Row Most Recent Value   Patient Weight  90.7 kg (200 lb)         Lab Results Last 24 H tissues and become infected. The fascia appears intact. Patient has long-standing history of severe COPD and tobacco use. Amputation was performed for ischemic rest pain and nonhealing ulcers. She had no revascularization option.     History:  Past Medi walking, difficulty with speech, temporary blindness, double vision, confusion    Physical Exam:  /77 (BP Location: Right arm)   Pulse 104   Temp 98.1 °F (36.7 °C) (Oral)   Resp 16   Ht 5' 6\" (1.676 m)   Wt 200 lb (90.7 kg)   SpO2 93%   BMI 32.28 kg 1. IP consult to Infectious Disease Once [[id number]] ordered by Franny Cristina MD at [phone]                                                                      INFECTIOUS DISEASE CONSULT NOTE    Jerry Layne Patient Status:  Inpatient    pack-year smoking history. She has never used smokeless tobacco. She reports that she does not drink alcohol or use drugs.     Allergies:    Pcns [Penicillins]      RASH, SWELLING    Medications:    Current Facility-Administered Medications:   •  Vancomycin Abdomen: Soft, nontender, nondistended. Positive bowel sounds.   Musculoskeletal: Left BKA- open wound with granulation tissue, serosanguinous drainage, no foul odor or periulcer cellulitis  Integument: No rash  LINES: RUE PICC clean    Laboratory Data: D/C Summary     No notes of this type exist for this encounter.          Physical Therapy Notes (last 72 hours) (Notes from 5/20/2018 12:48 PM through 5/23/2018 12:48 PM)      Physical Therapy Note signed by Lorelei Hudson PT at 5/23/2018 10:57 AM  Laura Peng Comment: Left stump debridement per Dr. Mark Gold  \"I feel much better though I am requesting not to get me in chair. I will start all that in Rehab facility. \" Patient was participatory. No family present.     Patient’s self-stated goal is to Comment : Above score is based on FIM definations    Skilled Therapy Provided: Patient was educated in importance of mobility & exercises + pre prosthetic training, Patient needed max reassurance & encouragement to participate & agreeable to participate af PT Discharge Recommendations: Sub-acute rehabilitation     PLAN  PT Treatment Plan: Bed mobility; Body mechanics; Endurance; Energy conservation;Patient education; Neuromuscular re-educate;Range of motion;Strengthening;Transfer training;Balance training  Rehab • CAD (coronary artery disease)    • Cataract     bilateral   • COPD (chronic obstructive pulmonary disease) (HCC)     O2 @ 3LNC continuous   • Deep vein thrombosis (HCC)     Left   • Disorder of thyroid     nodules   • History of blood clots    • Lupus Unable[BR.2]   How much help from another person does the patient currently need. ..[BR.1]   -   Moving to and from a bed to a chair (including a wheelchair)?: Total   -   Need to walk in hospital room?: Total   -   Climbing 3-5 steps with a railing?: Total personal factors impacting therapy include COPD,CAD,h/o DVT,Lupus,PVD. Wound vac connected. Pt will benefit from skilled PT to improve independence in transfers, bed mobility and to improve standing tolerance on one point support with AD.  Despite max encou April 2018 & was in [place]. Admitted this time for Debridement of left BKA wound due to infection.  S/p Debridment of Left BKA skin,muscle & fascia on 05/17/18  PMH as listed below includes h/o CAD, COPD,DVT,DM    Problem List  Active Problems:    Amputation WEIGHT BEARING RESTRICTION  Weight Bearing Restriction: R lower extremity;L lower extremity        R Lower Extremity: Full Weight Bearing  L Lower Extremity: Non-Weight Bearing    PAIN ASSESSMENT  Ratin  Location: Left BKA stump  Management Techniques: Standardized Score (AM-PAC Scale): 33.86   CMS Modifier (G-Code): CL    FUNCTIONAL ABILITY STATUS  Gait Assessment   Gait Assistance: Not tested  Distance (ft): 0  Assistive Device: None     Stoop/Curb Assistance: Not tested  Comment : Patient was able to deconditioning  Functional outcome measures completed include AM PAC scores. Based on this evaluation, patient's clinical presentation is evolving and overall the evaluation complexity is considered moderate.   These impairments and comorbidities manifest Fluad 0.5ml defer-03/30/18     Deferral:         Future Appointments        Provider Luna Dhillon    5/25/2018 2:30 PM [id number] [facility]

## (undated) NOTE — LETTER
Mary Ann Rodriguez [address]  [place], [address] [facility]    Consent for Operation  Date: ___3/28/2018_______________                                Time: _____2100__________    1.  I authorize the performance upon Gurmeet Couch the following operation procedure has been videotaped, the surgeon will obtain the original videotape. The hospital will not be responsible for storage or maintenance of this tape.     6. For the purpose of advancing medical education, I consent to the admittance of observers to t STATEMENTS REQUIRING INSERTION OR COMPLETION WERE FILLED IN.     Signature of Patient:   ___________________________    When the patient is a minor or mentally incompetent to give consent:  Signature of person authorized to consent for patient: ____________

## (undated) NOTE — LETTER
[facility]  [address] [facility], [address] [place] [address]    Consent for Operation    Date: __________________    Time: _______________    1.  I authorize the performance upon Delano Rizo the following operation:    Procedure(s):  LEFT ABOVE KNEE A videotape. The [facility] will not be responsible for storage or maintenance of this tape. 6. For the purpose of advancing medical education, I consent to the admittance of observers to the Operating Room.     7. I authorize the use of any specimen, organs Signature of Patient:   ___________________________    When the patient is a minor or mentally incompetent to give consent:  Signature of person authorized to consent for patient: ___________________________   Relationship to patient: _____________________ drugs/illegal medications). Failure to inform my anesthesiologist about these medicines may increase my risk of anesthetic complications. · If I am allergic to anything or have had a reaction to anesthesia before.     3. I understand how the anesthesia med I have discussed the procedure and information above with the patient (or patient’s representative) and answered their questions. The patient or their representative has agreed to have anesthesia services.     _______________________________________________

## (undated) NOTE — LETTER
[facility]  Jameson Rivera [address] [facility], [address] [place] [address]    Consent for Operation    Date: __________________    Time: _______________    1.  I authorize the performance upon Kleber Santo the following operation:    Procedure(s):  LEFT ABOVE KNEE A procedure has been videotaped, the surgeon will obtain the original videotape. The hospital will not be responsible for storage or maintenance of this tape.     6. For the purpose of advancing medical education, I consent to the admittance of observers to t STATEMENTS REQUIRING INSERTION OR COMPLETION WERE FILLED IN.     Signature of Patient:   ___________________________    When the patient is a minor or mentally incompetent to give consent:  Signature of person authorized to consent for patient: ____________ supplements, and pills I can buy without a prescription (including street drugs/illegal medications). Failure to inform my anesthesiologist about these medicines may increase my risk of anesthetic complications.   · If I am allergic to anything or have had Anesthesiologist Signature     Date   Time  I have discussed the procedure and information above with the patient (or patient’s representative) and answered their questions. The patient or their representative has agreed to have anesthesia services.     ___

## (undated) NOTE — IP AVS SNAPSHOT
[address]            (For Outpatient Use Only) Initial Admit Date: 4/9/2018   Inpt/Obs Admit Date: Inpt: 4/9/18 / Obs: N/A   Discharge Date:    Sheaen Mateo:  [de-identified]   MRN: [de-identified]   CSN: [id number]        ENCOUNTER  Patient C

## (undated) NOTE — LETTER
October 25, 2019    Milind Wu  [address] [facility] [address]      Dear Abrahan Allen: It was a pleasure speaking with you over the phone recently.  To follow up, I wanted to send you my contact information to utilize when y

## (undated) NOTE — LETTER
Last Revised 02/07/06  Obstructive Sleep Apnea Questionnaire    Clinical signs and symptoms suggesting the possibility of SHYAM    1. Predisposing physical characteristics (positive with any of the following present)  ? BMI 35kg/m²  ?  Craniofacial abnormalit pauses which are frightening to the observer, patient regularly falls asleep within minutes after being left unstimulated) in which case they should be treated as though they have severe sleep apnea.     The sleep laboratory’s assessment (none, mild, modera Point Total for B           C. Requirement for postoperative opioids.                Opioid requirement             Points   None 0    Low dose oral opiod 1    High dose oral opioids, parenteral or neuraxial opiods 3      Point Total for C        Estimation

## (undated) NOTE — LETTER
Consent to Procedure/Sedation    Date: 3/29/2018    Time: 1009    1. I authorize the performance upon Joselyn Ruggiero the following:    Angiogram of Left Extremity, Possible Angioplasty, Possible Stent Placement    2.  I authorize Dr. Lexii Salazar whomever is ___________________________    ___________________    Witness: ____________________     Date: ______________    Printed: 3/29/2018   10:09 AM    Patient Name: Sherrie Archibald        : 3/1/1945       Medical Record #: [id number]

## (undated) NOTE — IP AVS SNAPSHOT
Patient Demographics     Address  [address] [place] [address] Phone  [phone] [facility]  [phone] [facility]      Emergency Contact(s)     Name Relation Home Work Mobile    Mariluz Daughter [phone]    [email or web address] taking on:  7/6/2018   Tl Castellanos MD         Metoclopramide HCl 5 MG Tabs  Commonly known as:  REGLAN      Take 5 mg by mouth every 6 (six) hours as needed.  Nausea          MILK OF MAGNESIA 400 MG/5ML Susp  Generic drug:  magnesium hydroxide Bring a paper prescription for each of these medications  HYDROcodone-acetaminophen  MG Tabs           370-370-A - MAR ACTION REPORT  (last 24 hrs)    ** SITE UNKNOWN **     Order ID Medication Name Action Time Action Reason Comments    [id number] DUL Component Value Reference Range Flag Lab   Creatinine 0.86 0.55 - 1.02 mg/dL Anshul Sutton   GFR, Non- 68 >=60 — [facility] Flores   GFR, -American 78 >=60 — [facility]   Comment:           Estimated GFR units: mL/min/1.73 square meters   eG Leg marked appropriately[MG.1]    Electronically signed by Stephanie Salomon MD on 5/4/2018  5:07 PM   Attribution Rodriguez    MG.1 Kasey Kirkpatrick MD on 5/4/2018  5:06 PM                        Consults - MD Consult Notes      Consults signed by Rosa Mitchell Social History:  reports that she has quit smoking. She has a 56.00 pack-year smoking history. She has never used smokeless tobacco. She reports that she does not drink alcohol or use drugs.     Family History:   Family History   Problem Relation Age of Ons General: No acute distress. Alert and oriented x 3. HEENT: Normocephalic atraumatic. Moist mucous membranes. EOM-I. PERRLA. Anicteric. Neck: No lymphadenopathy. No JVD. No carotid bruits. Respiratory: Clear to auscultation bilaterally. No wheezes.  No rh Physical Therapy Note signed by Destini Voss PTA at 5/7/2018  3:54 PM  Version 1 of 1    Author:  Destini Voss PTA Service:  Rehab Author Type:  Physical Therapy Assistant    Filed:  5/7/2018  3:54 PM Date of Service:  5/7/2018  3:54 PM Statu • COPD (chronic obstructive pulmonary disease) (HCC)    • Deep vein thrombosis (HCC)     Left   • Disorder of thyroid     nodules   • History of blood clots    • Lupus    • Neuropathy    • Osteoarthritis    • Peripheral vascular disease ([address]    • Sleep ap -   Climbing 3-5 steps with a railing?: Total       AM-PAC Score:  Raw Score: 14   PT Approx Degree of Impairment Score: 61.29%   Standardized Score (AM-PAC Scale): 38.1   CMS Modifier (G-Code): CL    FUNCTIONAL ABILITY STATUS  Gait Assessment   Gait Jana PT Discharge Recommendations: Sub-acute rehabilitation (ELOS 12-14 days)     PLAN  PT Treatment Plan: Bed mobility; Endurance; Energy conservation;Patient education;Gait training;Strengthening;Range of motion;Transfer training;Balance training  Rehab Potenti Pt admitted on 4/9/18 for left BKA, then discharged to subacute rehab at [facility].                Problem List[JM.1]  Active Problems:    Peripheral vascular disease of extremity with claudication (HCC)    Chronic obstructive pulmonary disease, unspecified PAIN ASSESSMENT[JM.1]  Ratin  Location: left LE  Management Techniques:  Activity promotion;Breathing techniques;Repositioning[JM.2]    COGNITION  · Overall Cognitive Status:  WFL - within functional limits    RANGE OF MOTION AND STRENGTH ASSESS Pt refuses to attempt standing or transfer to chair despite education on benefits of out of bed activity. Pt able to complete sit to supine with supervision. Discussed PT POC and discharge recommendations; pt verbalizes understanding.      Exercise/Educatio education;Gait training;Strengthening;Range of motion;Transfer training;Balance training  Rehab Potential : Good  Frequency (Obs): Daily  Number of Visits to Meet Established Goals: 4[JM.2]      CURRENT GOALS    Goal #1 Patient is able to demonstrate supin Peripheral vascular disease of extremity with claudication (HCC)    Chronic obstructive pulmonary disease, unspecified COPD type ([facility]    Sleep apnea    Anxiety disorder    S/P BKA (below knee amputation) unilateral, left (HCC)    Hypothyroidism      Pas Overall Cognitive Status:  WFL - within functional limits    VISION  Current Vision: no visual deficits    PERCEPTION  Overall Perception Status:   WFL - within functional limits    SENSATION  Light touch:  intact    Communication: Pt is able to communicat Patient is a 68year old female admitted on 5/4/2018 for decubitus ulcer of left residual limb s/p debridement (5/4/18). Complete medical history and occupational profile noted above. Functional outcome measures completed include AMPAC, MMT, ROM.  In this O Patient will be supervision with bilateral AROM HEP (home exercise program). Additional Goals:  Pt will verbalize at least 3 energy conservation techniques  Pt will sit unsupported at sink for 5 minutes to complete grooming routine[KP. 1]       Attributi